# Patient Record
Sex: MALE | Race: WHITE | NOT HISPANIC OR LATINO | Employment: OTHER | URBAN - METROPOLITAN AREA
[De-identification: names, ages, dates, MRNs, and addresses within clinical notes are randomized per-mention and may not be internally consistent; named-entity substitution may affect disease eponyms.]

---

## 2017-11-10 ENCOUNTER — DOCUMENTATION ONLY (OUTPATIENT)
Dept: PREADMISSION TESTING | Facility: HOSPITAL | Age: 72
End: 2017-11-10

## 2017-11-10 ENCOUNTER — DOCUMENTATION ONLY (OUTPATIENT)
Dept: ORTHOPEDICS | Facility: CLINIC | Age: 72
End: 2017-11-10

## 2017-11-13 ENCOUNTER — HOSPITAL ENCOUNTER (OUTPATIENT)
Dept: RADIOLOGY | Facility: HOSPITAL | Age: 72
Discharge: HOME | End: 2017-11-13
Attending: ORTHOPAEDIC SURGERY
Payer: MEDICARE

## 2017-11-13 ENCOUNTER — HOSPITAL ENCOUNTER (OUTPATIENT)
Dept: PREADMISSION TESTING | Facility: HOSPITAL | Age: 72
Discharge: HOME | End: 2017-11-13
Attending: ORTHOPAEDIC SURGERY
Payer: MEDICARE

## 2017-11-13 ENCOUNTER — TELEPHONE (OUTPATIENT)
Dept: ORTHOPEDICS | Facility: CLINIC | Age: 72
End: 2017-11-13

## 2017-11-13 ENCOUNTER — DOCUMENTATION ONLY (OUTPATIENT)
Dept: ORTHOPEDICS | Facility: CLINIC | Age: 72
End: 2017-11-13

## 2017-11-13 VITALS — WEIGHT: 175 LBS | HEIGHT: 66 IN | BODY MASS INDEX: 28.12 KG/M2

## 2017-11-13 DIAGNOSIS — Z01.818 PRE-OP EXAM: ICD-10-CM

## 2017-11-13 DIAGNOSIS — M17.12 OSTEOARTHRITIS OF LEFT KNEE, UNSPECIFIED OSTEOARTHRITIS TYPE: Primary | ICD-10-CM

## 2017-11-13 LAB
ALBUMIN SERPL BCP-MCNC: 3.5 G/DL
ALP SERPL-CCNC: 36 U/L
ALT SERPL W/O P-5'-P-CCNC: 15 U/L
ANION GAP SERPL CALC-SCNC: 7 MMOL/L
AST SERPL-CCNC: 19 U/L
BASOPHILS # BLD AUTO: 0 K/UL
BASOPHILS NFR BLD: 0.1 %
BILIRUB SERPL-MCNC: 0.6 MG/DL
BILIRUB UR QL STRIP: NEGATIVE
BUN SERPL-MCNC: 18 MG/DL
CALCIUM SERPL-MCNC: 9.2 MG/DL
CHLORIDE SERPL-SCNC: 104 MMOL/L
CLARITY UR: CLEAR
CO2 SERPL-SCNC: 29 MMOL/L
COLOR UR: YELLOW
CREAT SERPL-MCNC: 1.2 MG/DL
DIFFERENTIAL METHOD: ABNORMAL
EOSINOPHIL # BLD AUTO: 0.2 K/UL
EOSINOPHIL NFR BLD: 3 %
ERYTHROCYTE [DISTWIDTH] IN BLOOD BY AUTOMATED COUNT: 12.8 %
EST. GFR  (AFRICAN AMERICAN): >60 ML/MIN/1.73 M^2
EST. GFR  (NON AFRICAN AMERICAN): >60 ML/MIN/1.73 M^2
GLUCOSE SERPL-MCNC: 91 MG/DL
GLUCOSE UR QL STRIP: NEGATIVE
HCT VFR BLD AUTO: 41.9 %
HGB BLD-MCNC: 14.2 G/DL
HGB UR QL STRIP: NEGATIVE
KETONES UR QL STRIP: NEGATIVE
LEUKOCYTE ESTERASE UR QL STRIP: NEGATIVE
LYMPHOCYTES # BLD AUTO: 1.1 K/UL
LYMPHOCYTES NFR BLD: 18.3 %
MCH RBC QN AUTO: 31.7 PG
MCHC RBC AUTO-ENTMCNC: 34 G/DL
MCV RBC AUTO: 93 FL
MONOCYTES # BLD AUTO: 0.6 K/UL
MONOCYTES NFR BLD: 11.1 %
NEUTROPHILS # BLD AUTO: 3.9 K/UL
NEUTROPHILS NFR BLD: 67.5 %
NITRITE UR QL STRIP: NEGATIVE
PH UR STRIP: 6 [PH] (ref 5–8)
PLATELET # BLD AUTO: 257 K/UL
PMV BLD AUTO: 7.9 FL
POTASSIUM SERPL-SCNC: 4.1 MMOL/L
PROT SERPL-MCNC: 7.1 G/DL
PROT UR QL STRIP: NEGATIVE
RBC # BLD AUTO: 4.5 M/UL
SODIUM SERPL-SCNC: 140 MMOL/L
SP GR UR STRIP: 1.02 (ref 1–1.03)
URN SPEC COLLECT METH UR: NORMAL
UROBILINOGEN UR STRIP-ACNC: NEGATIVE EU/DL
WBC # BLD AUTO: 5.8 K/UL

## 2017-11-13 PROCEDURE — 99900103 DSU ONLY-NO CHARGE-INITIAL HR (STAT)

## 2017-11-13 PROCEDURE — 36415 COLL VENOUS BLD VENIPUNCTURE: CPT

## 2017-11-13 PROCEDURE — 93010 ELECTROCARDIOGRAM REPORT: CPT | Mod: ,,, | Performed by: INTERNAL MEDICINE

## 2017-11-13 PROCEDURE — 87081 CULTURE SCREEN ONLY: CPT

## 2017-11-13 PROCEDURE — 85025 COMPLETE CBC W/AUTO DIFF WBC: CPT

## 2017-11-13 PROCEDURE — 71020 XR CHEST PA AND LATERAL: CPT | Mod: TC

## 2017-11-13 PROCEDURE — 80053 COMPREHEN METABOLIC PANEL: CPT

## 2017-11-13 PROCEDURE — 81003 URINALYSIS AUTO W/O SCOPE: CPT

## 2017-11-13 PROCEDURE — 99900104 DSU ONLY-NO CHARGE-EA ADD'L HR (STAT)

## 2017-11-13 PROCEDURE — 71020 XR CHEST PA AND LATERAL: CPT | Mod: 26,,, | Performed by: RADIOLOGY

## 2017-11-13 PROCEDURE — 93005 ELECTROCARDIOGRAM TRACING: CPT

## 2017-11-13 RX ORDER — OMEPRAZOLE 20 MG/1
20 CAPSULE, DELAYED RELEASE ORAL DAILY
COMMUNITY
End: 2020-07-29 | Stop reason: SDUPTHER

## 2017-11-13 RX ORDER — MECLIZINE HYDROCHLORIDE 25 MG/1
25 TABLET ORAL
COMMUNITY
End: 2021-01-27 | Stop reason: SDUPTHER

## 2017-11-13 RX ORDER — ATORVASTATIN CALCIUM 20 MG/1
20 TABLET, FILM COATED ORAL DAILY
COMMUNITY
End: 2020-07-29 | Stop reason: SDUPTHER

## 2017-11-13 RX ORDER — LEVOTHYROXINE SODIUM 50 UG/1
50 TABLET ORAL DAILY
COMMUNITY
End: 2019-12-26 | Stop reason: SDUPTHER

## 2017-11-13 RX ORDER — FUROSEMIDE 20 MG/1
20 TABLET ORAL DAILY
COMMUNITY

## 2017-11-13 RX ORDER — ATENOLOL 25 MG/1
25 TABLET ORAL NIGHTLY
COMMUNITY
End: 2020-07-29 | Stop reason: SDUPTHER

## 2017-11-13 RX ORDER — CYCLOBENZAPRINE HCL 10 MG
10 TABLET ORAL 2 TIMES DAILY PRN
COMMUNITY
End: 2020-01-24 | Stop reason: SDUPTHER

## 2017-11-13 NOTE — PRE ADMISSION SCREENING
Patient Name: Mor Hill  YOB: 1945   MRN: [id number]     [facility]   Basic Mobility Inpatient Short Form 6 Clicks         How much difficulty does the patient currently have  Unable  A Lot  A Little  None      1. Turning over in bed (including adjusting bedclothes, sheets and blankets)?     1 []    2 []    3 [x]    4 []        2. Sitting down on and standing up from a chair with arms (e.g., wheelchair, bedside commode, etc.)     1 []  2 []  3 [x]     4 []      3. Moving from lying on back to sitting on the side of the bed?     1 []  2 []  3 []    4 [x]    How much help from another person does the patient currently need  Total  A Lot  A Little  None      4. Moving to and from a bed to a chair (including a wheelchair)?    1 []  2 []  3 [x]    4 []      5. Need to walk in hospital room?    1 []  2 []  3 []    4 [x]      6. Climbing 3-5 steps with a railing?    1 []  2 []  3 [x]    4 []       Raw Score:     20             CMS 0-100% Score:  35.83          %   Standardized Score:    47.67           CMS Modifier:     CJ                                     [facility]   Basic [facility]ility [facility]patient Short Form 6 Clicks Score Conversion Table*         *Use this form to convert -PAC Basic Mobility Inpatient Raw Scores.   [facility] Inpatient Basic Mobility Short Form Scoring Example   1. Add the number values associated with the response to each item. For example, items totals yield a Raw Score of 21.   2. Match the raw score to the t-Scale scores (t-Scale score = 50.25, SE = 4.69).   3. Find the associated CMS % (CMS % = 28.97%).   4. Locate the correct CMS Functional Modifier Code, or G Code (G code = CJ)     NOTE: Each -PAC Short Form has a separate conversion table. Make sure that you use the correct conversion table.       Instruction Manual - page 45 contains conversion table

## 2017-11-13 NOTE — PRE ADMISSION SCREENING
JOINT CAMP ASSESSMENT    Name Mor Hill   MRN [id number]    Age/Sex 71 y.o. male    Surgeon Dr. Renny Linda   Joint Camp Date 11/13/2017   Surgery Date 11/27/2017   Procedure Left Knee Arthroplasty   Insurance Payor: [employer] / Plan: [employer] 65 / Product Type: Medicare Advantage /    Care Team Patient Care Team:  Primary Doctor No as PCP - General  Renny Linda MD as Consulting Physician (Orthopedic Surgery)    Pharmacy No Pharmacies Listed   AM-PAC Score   20   Risk Assessment Score 5     Past Medical History:   Diagnosis Date    Coronary artery disease     Heart attack     Hypertension     Thyroid disease     Vertigo        Past Surgical History:   Procedure Laterality Date    bilateral hammer toe      CARDIAC SURGERY      bypass    HERNIA REPAIR      left RCR      LUNG REMOVAL, PARTIAL      ROTATOR CUFF REPAIR      left         Home Enviroment     Living Arrangement: Lives with spouse, Lives in home  Home Environment: 1-story house/ trailer, number of outside stairs: 0, walk-in shower  Home Safety Concerns: Pets in the home: dogs (2).    DISCHARGE CAREGIVER/SUPPORT SYSTEM     Identified post-op caregiver: Patient has spouse / significant other and caregivers available 24 hours per day.  Patient's caregiver(s) will be able to provide physical assistance. Patient will have someone to assist overnight.      Caregiver present at pre-op interview:  No      PRE-OPERATIVE FUNCTIONAL STATUS     Employment: Retired    Pre-op Functional Status: Patient is independent with mobility/ambulation, transfers, ADL's, IADL's.    Use of assistive device for ambulation: none  ADL: self care  ADL Limitations: none  Medical Restrictions: Decreased range of motions in extremities    POTENTIAL BARRIERS TO DISCHARGE/POTENTIAL POST-OP COMPLICATIONS     Concerned about the level of assistance for patient. Wife currently with broken toe. Patient states that he and wife will leave hospital  via cab due to wifes inability to drive. Patients states that he is the one who buys the groceries and is concerned about not driving for a 6 week period. States that he would rather skip the pain medication in order to drive earlier. Patient stated that he got bored after heart surgery and drove to [facility] to get out of the house against MD orders.    DISCHARGE PLAN     Expected LOS of 2 days or less for joint replacement discussed with patient. We also discussed a discharge path of HH for approximately two weeks with a transition to outpatient PT on the third week given no post-op complications.      Patient in agreement with discharge plan: Yes    Discharge to: Discharge home with home darrell (PT/OT) x2 weeks with transition to outpatient PT.     HH: [facility]      OP PT: [facility]     Home DME: rolling walker     Needed DME at D/C: bedside commode     Rx: Per Dr. Linda at discharge.     Meds to Beds:   Patient expected to discharge on current dose of Plavix 75 mg daily for DVT prophylaxis.

## 2017-11-13 NOTE — PRE ADMISSION SCREENING
"               CJR Risk Assessment Scale    Patient Name: Mor Hill  YOB: 1945  MRN: [id number]            RIsk Factor Measure Recommendation Patient Data Scale/Score   BMI >40 Reconsider surgery, weight loss   Estimated body mass index is 28.25 kg/m² as calculated from the following:    Height as of this encounter: 5' 6" (1.676 m).    Weight as of this encounter: 79.4 kg (175 lb).   [] 0 = 1 - 24.9  [x] 1 = 25-29.9  [] 2 = 30-34.9  [] 3 = 35-39.9  [] 4 = 40-44.9  [] 5 = 45-99.9   Hemoglobin AIC (if applicable) >9 Delay surgery until DM under control  Refer for:  · Nutrition Therapy  · Exercise   · Medication    No results found for: LABA1C, HGBA1C    Lab Results   Component Value Date     (H) 09/26/2014      [] 0 = 4.0-5.6  [] 1 = 5.7-6.4  [] 2 = 6.5-6.9  [] 3 = 7.0-7.9  [] 4 = 8.0-8.9  [] 5 = 9.0-12   Hemoglobin (Anemia) <9 Delay surgery   Correct anemia Lab Results   Component Value Date    HGB 13.7 (L) 09/26/2014    [] 20 - <9.0                    Albumin <3 Delay surgery &Workup Lab Results   Component Value Date    ALBUMIN 3.5 09/26/2014    [] 20 - <3.0   Smoking Cessation >4 Weeks Delay Surgery  Refer to OP Cessation Class     [] 20 - current smoker                                _____ PPD                    Hx of MI, PE, Arrhythmia, CVA, DVT <30 Days Delay Surgery     [] 20      Infection Variable Delay surgery and re-evaluate    [] 20 - recent/current infection     Depression (PHQ) >10 out of 27 Delay Surgery and re-evaluate  Medication  Counseling              [x] 0     []1     []2     []3      []4      [] 5                    (1-4)      (5-9)  (10-14)  (15-19)   (20-27)     Memory Impairment & Memory loss (Mini-Cog Screening Tool) Advanced dementia and/or Parkinson's Reconsider surgery     [x] 0     []1     []2     []3     []4     [] 5     Physical Conditioning (Modified AM-PAC Per Physical Therapy at [facility]) Unable to ambulate on day of surgery Delay surgery and " re-evaluate  Pre-Rehabilitation   (PT evaluation)       []  0   [x]4       []8     []12        []16     []20       (<20%)   (<40%)   (<60%)   (<80% )    (>80%)     Home Environment/Caregiver support  (Per /Navigator Interview)    Availability of basic services and/or approprate assistance during post-operative period Delay surgery and re-evaluate  Safe home environment  Health   1 week post-surgery  Transportation  availability  Ability to obtain DME/Medications post-op    [x] 0     []1     []2     []3     []4     [] 5  [x] 0     []1     []2     []3     []4     [] 5  [x] 0     []1     []2     []3     []4     [] 5  [x] 0     []1     []2     []3     []4     [] 5         MD Contact: Dr. Linda Comments:  Total Score:  5

## 2017-11-15 LAB — MRSA SPEC QL CULT: NORMAL

## 2017-11-24 ENCOUNTER — ANESTHESIA EVENT (OUTPATIENT)
Dept: SURGERY | Facility: HOSPITAL | Age: 72
DRG: 470 | End: 2017-11-24
Payer: MEDICARE

## 2017-11-27 ENCOUNTER — ANESTHESIA (OUTPATIENT)
Dept: SURGERY | Facility: HOSPITAL | Age: 72
DRG: 470 | End: 2017-11-27
Payer: MEDICARE

## 2017-11-27 ENCOUNTER — HOSPITAL ENCOUNTER (INPATIENT)
Facility: HOSPITAL | Age: 72
LOS: 2 days | Discharge: HOME HEALTH | DRG: 470 | End: 2017-11-29
Attending: ORTHOPAEDIC SURGERY | Admitting: ORTHOPAEDIC SURGERY
Payer: MEDICARE

## 2017-11-27 DIAGNOSIS — Z96.652 S/P TOTAL KNEE ARTHROPLASTY, LEFT: Primary | ICD-10-CM

## 2017-11-27 DIAGNOSIS — M17.12 OSTEOARTHRITIS OF LEFT KNEE: ICD-10-CM

## 2017-11-27 DIAGNOSIS — I25.10 CORONARY ARTERY DISEASE, ANGINA PRESENCE UNSPECIFIED, UNSPECIFIED VESSEL OR LESION TYPE, UNSPECIFIED WHETHER NATIVE OR TRANSPLANTED HEART: ICD-10-CM

## 2017-11-27 DIAGNOSIS — I10 HYPERTENSION, UNSPECIFIED TYPE: ICD-10-CM

## 2017-11-27 DIAGNOSIS — M17.12 OSTEOARTHRITIS OF LEFT KNEE, UNSPECIFIED OSTEOARTHRITIS TYPE: ICD-10-CM

## 2017-11-27 DIAGNOSIS — E07.9 THYROID DISEASE: ICD-10-CM

## 2017-11-27 PROCEDURE — C2626 INFUSION PUMP, NON-PROG,TEMP: HCPCS | Performed by: ANESTHESIOLOGY

## 2017-11-27 PROCEDURE — D9220A PRA ANESTHESIA: Mod: CRNA,,, | Performed by: NURSE ANESTHETIST, CERTIFIED REGISTERED

## 2017-11-27 PROCEDURE — 12000002 HC ACUTE/MED SURGE SEMI-PRIVATE ROOM

## 2017-11-27 PROCEDURE — 25000003 PHARM REV CODE 250: Performed by: ANESTHESIOLOGY

## 2017-11-27 PROCEDURE — 99900103 DSU ONLY-NO CHARGE-INITIAL HR (STAT): Performed by: ORTHOPAEDIC SURGERY

## 2017-11-27 PROCEDURE — 27000221 HC OXYGEN, UP TO 24 HOURS

## 2017-11-27 PROCEDURE — 25000003 PHARM REV CODE 250: Performed by: INTERNAL MEDICINE

## 2017-11-27 PROCEDURE — 37000009 HC ANESTHESIA EA ADD 15 MINS: Performed by: ORTHOPAEDIC SURGERY

## 2017-11-27 PROCEDURE — 63600175 PHARM REV CODE 636 W HCPCS: Performed by: ANESTHESIOLOGY

## 2017-11-27 PROCEDURE — 27200750 HC INSULATED NEEDLE/ STIMUPLEX: Performed by: ANESTHESIOLOGY

## 2017-11-27 PROCEDURE — 76942 ECHO GUIDE FOR BIOPSY: CPT | Mod: 26,,, | Performed by: ANESTHESIOLOGY

## 2017-11-27 PROCEDURE — 99222 1ST HOSP IP/OBS MODERATE 55: CPT | Mod: ,,, | Performed by: INTERNAL MEDICINE

## 2017-11-27 PROCEDURE — 94761 N-INVAS EAR/PLS OXIMETRY MLT: CPT

## 2017-11-27 PROCEDURE — S5010 5% DEXTROSE AND 0.45% SALINE: HCPCS | Performed by: ORTHOPAEDIC SURGERY

## 2017-11-27 PROCEDURE — 63600175 PHARM REV CODE 636 W HCPCS: Performed by: ORTHOPAEDIC SURGERY

## 2017-11-27 PROCEDURE — 97116 GAIT TRAINING THERAPY: CPT

## 2017-11-27 PROCEDURE — 37000008 HC ANESTHESIA 1ST 15 MINUTES: Performed by: ORTHOPAEDIC SURGERY

## 2017-11-27 PROCEDURE — 36000711: Performed by: ORTHOPAEDIC SURGERY

## 2017-11-27 PROCEDURE — 71000039 HC RECOVERY, EACH ADD'L HOUR: Performed by: ORTHOPAEDIC SURGERY

## 2017-11-27 PROCEDURE — 0SRD0J9 REPLACEMENT OF LEFT KNEE JOINT WITH SYNTHETIC SUBSTITUTE, CEMENTED, OPEN APPROACH: ICD-10-PCS | Performed by: ORTHOPAEDIC SURGERY

## 2017-11-27 PROCEDURE — 27200664 HC NERVE BLOCK COMPLETE KIT: Performed by: ANESTHESIOLOGY

## 2017-11-27 PROCEDURE — C1776 JOINT DEVICE (IMPLANTABLE): HCPCS | Performed by: ORTHOPAEDIC SURGERY

## 2017-11-27 PROCEDURE — 25000003 PHARM REV CODE 250: Performed by: NURSE ANESTHETIST, CERTIFIED REGISTERED

## 2017-11-27 PROCEDURE — 99900104 DSU ONLY-NO CHARGE-EA ADD'L HR (STAT): Performed by: ORTHOPAEDIC SURGERY

## 2017-11-27 PROCEDURE — 63600175 PHARM REV CODE 636 W HCPCS: Performed by: NURSE ANESTHETIST, CERTIFIED REGISTERED

## 2017-11-27 PROCEDURE — 27200688 HC TRAY, SPINAL-HYPER/ ISOBARIC: Performed by: NURSE ANESTHETIST, CERTIFIED REGISTERED

## 2017-11-27 PROCEDURE — D9220A PRA ANESTHESIA: Mod: ANES,,, | Performed by: ANESTHESIOLOGY

## 2017-11-27 PROCEDURE — 63600175 PHARM REV CODE 636 W HCPCS

## 2017-11-27 PROCEDURE — 64448 NJX AA&/STRD FEM NRV NFS IMG: CPT | Mod: 59,LT,, | Performed by: ANESTHESIOLOGY

## 2017-11-27 PROCEDURE — 36000710: Performed by: ORTHOPAEDIC SURGERY

## 2017-11-27 PROCEDURE — 71000033 HC RECOVERY, INTIAL HOUR: Performed by: ORTHOPAEDIC SURGERY

## 2017-11-27 PROCEDURE — C1729 CATH, DRAINAGE: HCPCS | Performed by: ORTHOPAEDIC SURGERY

## 2017-11-27 PROCEDURE — 25000003 PHARM REV CODE 250: Performed by: ORTHOPAEDIC SURGERY

## 2017-11-27 PROCEDURE — 27201423 OPTIME MED/SURG SUP & DEVICES STERILE SUPPLY: Performed by: ORTHOPAEDIC SURGERY

## 2017-11-27 PROCEDURE — 97530 THERAPEUTIC ACTIVITIES: CPT

## 2017-11-27 PROCEDURE — 76942 ECHO GUIDE FOR BIOPSY: CPT | Performed by: ANESTHESIOLOGY

## 2017-11-27 PROCEDURE — 64450 NJX AA&/STRD OTHER PN/BRANCH: CPT | Mod: 59,LT,, | Performed by: ANESTHESIOLOGY

## 2017-11-27 PROCEDURE — C1713 ANCHOR/SCREW BN/BN,TIS/BN: HCPCS | Performed by: ORTHOPAEDIC SURGERY

## 2017-11-27 PROCEDURE — 97161 PT EVAL LOW COMPLEX 20 MIN: CPT

## 2017-11-27 DEVICE — IMPLANTABLE DEVICE: Type: IMPLANTABLE DEVICE | Site: KNEE | Status: FUNCTIONAL

## 2017-11-27 DEVICE — PATELLA ONLAY 29MM TRI PEG: Type: IMPLANTABLE DEVICE | Site: KNEE | Status: FUNCTIONAL

## 2017-11-27 RX ORDER — ONDANSETRON 2 MG/ML
INJECTION INTRAMUSCULAR; INTRAVENOUS
Status: DISCONTINUED | OUTPATIENT
Start: 2017-11-27 | End: 2017-11-27

## 2017-11-27 RX ORDER — OXYCODONE HCL 10 MG/1
10 TABLET, FILM COATED, EXTENDED RELEASE ORAL EVERY 12 HOURS
Status: DISCONTINUED | OUTPATIENT
Start: 2017-11-27 | End: 2017-11-29 | Stop reason: HOSPADM

## 2017-11-27 RX ORDER — KETOROLAC TROMETHAMINE 30 MG/ML
INJECTION, SOLUTION INTRAMUSCULAR; INTRAVENOUS
Status: DISCONTINUED | OUTPATIENT
Start: 2017-11-27 | End: 2017-11-27 | Stop reason: HOSPADM

## 2017-11-27 RX ORDER — FENTANYL CITRATE 50 UG/ML
INJECTION, SOLUTION INTRAMUSCULAR; INTRAVENOUS
Status: DISCONTINUED | OUTPATIENT
Start: 2017-11-27 | End: 2017-11-27

## 2017-11-27 RX ORDER — CELECOXIB 100 MG/1
100 CAPSULE ORAL 2 TIMES DAILY
Status: DISCONTINUED | OUTPATIENT
Start: 2017-11-27 | End: 2017-11-29 | Stop reason: HOSPADM

## 2017-11-27 RX ORDER — CELECOXIB 100 MG/1
200 CAPSULE ORAL
Status: COMPLETED | OUTPATIENT
Start: 2017-11-27 | End: 2017-11-27

## 2017-11-27 RX ORDER — ZOLPIDEM TARTRATE 5 MG/1
5 TABLET ORAL NIGHTLY PRN
Status: DISCONTINUED | OUTPATIENT
Start: 2017-11-27 | End: 2017-11-29 | Stop reason: HOSPADM

## 2017-11-27 RX ORDER — ATENOLOL 25 MG/1
25 TABLET ORAL NIGHTLY
Status: DISCONTINUED | OUTPATIENT
Start: 2017-11-27 | End: 2017-11-29 | Stop reason: HOSPADM

## 2017-11-27 RX ORDER — PROPOFOL 10 MG/ML
VIAL (ML) INTRAVENOUS CONTINUOUS PRN
Status: DISCONTINUED | OUTPATIENT
Start: 2017-11-27 | End: 2017-11-27

## 2017-11-27 RX ORDER — ACETAMINOPHEN 10 MG/ML
1000 INJECTION, SOLUTION INTRAVENOUS EVERY 8 HOURS
Status: COMPLETED | OUTPATIENT
Start: 2017-11-27 | End: 2017-11-28

## 2017-11-27 RX ORDER — CLOPIDOGREL BISULFATE 75 MG/1
75 TABLET ORAL DAILY
Status: DISCONTINUED | OUTPATIENT
Start: 2017-11-28 | End: 2017-11-29 | Stop reason: HOSPADM

## 2017-11-27 RX ORDER — ATORVASTATIN CALCIUM 20 MG/1
20 TABLET, FILM COATED ORAL DAILY
Status: DISCONTINUED | OUTPATIENT
Start: 2017-11-28 | End: 2017-11-29 | Stop reason: HOSPADM

## 2017-11-27 RX ORDER — TRANEXAMIC ACID 100 MG/ML
INJECTION, SOLUTION INTRAVENOUS
Status: DISCONTINUED | OUTPATIENT
Start: 2017-11-27 | End: 2017-11-27

## 2017-11-27 RX ORDER — ROPIVACAINE HYDROCHLORIDE 5 MG/ML
INJECTION, SOLUTION EPIDURAL; INFILTRATION; PERINEURAL
Status: DISCONTINUED | OUTPATIENT
Start: 2017-11-27 | End: 2017-11-27 | Stop reason: HOSPADM

## 2017-11-27 RX ORDER — OXYCODONE HYDROCHLORIDE 5 MG/1
5 TABLET ORAL
Status: DISCONTINUED | OUTPATIENT
Start: 2017-11-27 | End: 2017-11-29 | Stop reason: HOSPADM

## 2017-11-27 RX ORDER — ASPIRIN 325 MG
325 TABLET ORAL 2 TIMES DAILY
Status: DISCONTINUED | OUTPATIENT
Start: 2017-11-27 | End: 2017-11-29 | Stop reason: HOSPADM

## 2017-11-27 RX ORDER — FENTANYL CITRATE 50 UG/ML
25 INJECTION, SOLUTION INTRAMUSCULAR; INTRAVENOUS EVERY 5 MIN PRN
Status: DISCONTINUED | OUTPATIENT
Start: 2017-11-27 | End: 2017-11-27 | Stop reason: HOSPADM

## 2017-11-27 RX ORDER — ONDANSETRON 2 MG/ML
4 INJECTION INTRAMUSCULAR; INTRAVENOUS DAILY PRN
Status: DISCONTINUED | OUTPATIENT
Start: 2017-11-27 | End: 2017-11-27 | Stop reason: HOSPADM

## 2017-11-27 RX ORDER — FAMOTIDINE 20 MG/1
20 TABLET, FILM COATED ORAL 2 TIMES DAILY
Status: DISCONTINUED | OUTPATIENT
Start: 2017-11-27 | End: 2017-11-29 | Stop reason: HOSPADM

## 2017-11-27 RX ORDER — PREGABALIN 75 MG/1
75 CAPSULE ORAL
Status: COMPLETED | OUTPATIENT
Start: 2017-11-27 | End: 2017-11-27

## 2017-11-27 RX ORDER — MIDAZOLAM HYDROCHLORIDE 1 MG/ML
INJECTION, SOLUTION INTRAMUSCULAR; INTRAVENOUS
Status: DISCONTINUED | OUTPATIENT
Start: 2017-11-27 | End: 2017-11-27

## 2017-11-27 RX ORDER — DEXTROSE MONOHYDRATE AND SODIUM CHLORIDE 5; .45 G/100ML; G/100ML
INJECTION, SOLUTION INTRAVENOUS CONTINUOUS
Status: DISCONTINUED | OUTPATIENT
Start: 2017-11-27 | End: 2017-11-27

## 2017-11-27 RX ORDER — MORPHINE SULFATE 4 MG/ML
2 INJECTION, SOLUTION INTRAMUSCULAR; INTRAVENOUS EVERY 4 HOURS PRN
Status: DISCONTINUED | OUTPATIENT
Start: 2017-11-27 | End: 2017-11-29 | Stop reason: HOSPADM

## 2017-11-27 RX ORDER — LEVOTHYROXINE SODIUM 50 UG/1
50 TABLET ORAL
Status: DISCONTINUED | OUTPATIENT
Start: 2017-11-28 | End: 2017-11-29 | Stop reason: HOSPADM

## 2017-11-27 RX ORDER — OXYCODONE HCL 10 MG/1
10 TABLET, FILM COATED, EXTENDED RELEASE ORAL
Status: COMPLETED | OUTPATIENT
Start: 2017-11-27 | End: 2017-11-27

## 2017-11-27 RX ORDER — ONDANSETRON 4 MG/1
8 TABLET, ORALLY DISINTEGRATING ORAL EVERY 8 HOURS PRN
Status: DISCONTINUED | OUTPATIENT
Start: 2017-11-27 | End: 2017-11-29 | Stop reason: HOSPADM

## 2017-11-27 RX ORDER — CEFAZOLIN SODIUM 2 G/50ML
2 SOLUTION INTRAVENOUS ONCE
Status: COMPLETED | OUTPATIENT
Start: 2017-11-27 | End: 2017-11-27

## 2017-11-27 RX ORDER — ACETAMINOPHEN 10 MG/ML
1000 INJECTION, SOLUTION INTRAVENOUS EVERY 8 HOURS
Status: DISCONTINUED | OUTPATIENT
Start: 2017-11-27 | End: 2017-11-27 | Stop reason: HOSPADM

## 2017-11-27 RX ORDER — PREGABALIN 75 MG/1
75 CAPSULE ORAL ONCE
Status: DISCONTINUED | OUTPATIENT
Start: 2017-11-27 | End: 2017-11-29 | Stop reason: HOSPADM

## 2017-11-27 RX ORDER — LIDOCAINE HYDROCHLORIDE 10 MG/ML
1 INJECTION, SOLUTION EPIDURAL; INFILTRATION; INTRACAUDAL; PERINEURAL ONCE
Status: COMPLETED | OUTPATIENT
Start: 2017-11-27 | End: 2017-11-27

## 2017-11-27 RX ORDER — EPINEPHRINE 0.1 MG/ML
INJECTION INTRAVENOUS
Status: DISCONTINUED | OUTPATIENT
Start: 2017-11-27 | End: 2017-11-27 | Stop reason: HOSPADM

## 2017-11-27 RX ORDER — POLYETHYLENE GLYCOL 3350 17 G/17G
17 POWDER, FOR SOLUTION ORAL DAILY
Status: DISCONTINUED | OUTPATIENT
Start: 2017-11-27 | End: 2017-11-29 | Stop reason: HOSPADM

## 2017-11-27 RX ORDER — LIDOCAINE HCL/PF 100 MG/5ML
SYRINGE (ML) INTRAVENOUS
Status: DISCONTINUED | OUTPATIENT
Start: 2017-11-27 | End: 2017-11-27

## 2017-11-27 RX ORDER — BISACODYL 10 MG
10 SUPPOSITORY, RECTAL RECTAL DAILY
Status: DISCONTINUED | OUTPATIENT
Start: 2017-11-27 | End: 2017-11-29 | Stop reason: HOSPADM

## 2017-11-27 RX ORDER — CEFAZOLIN SODIUM 1 G/50ML
1 SOLUTION INTRAVENOUS
Status: COMPLETED | OUTPATIENT
Start: 2017-11-27 | End: 2017-11-28

## 2017-11-27 RX ORDER — MORPHINE SULFATE 0.5 MG/ML
INJECTION, SOLUTION EPIDURAL; INTRATHECAL; INTRAVENOUS
Status: DISCONTINUED | OUTPATIENT
Start: 2017-11-27 | End: 2017-11-27 | Stop reason: HOSPADM

## 2017-11-27 RX ORDER — SODIUM CHLORIDE, SODIUM LACTATE, POTASSIUM CHLORIDE, CALCIUM CHLORIDE 600; 310; 30; 20 MG/100ML; MG/100ML; MG/100ML; MG/100ML
1000 INJECTION, SOLUTION INTRAVENOUS ONCE
Status: DISCONTINUED | OUTPATIENT
Start: 2017-11-27 | End: 2017-11-27 | Stop reason: HOSPADM

## 2017-11-27 RX ADMIN — MIDAZOLAM 1 MG: 1 INJECTION INTRAMUSCULAR; INTRAVENOUS at 06:11

## 2017-11-27 RX ADMIN — ROPIVACAINE HYDROCHLORIDE: 2 INJECTION, SOLUTION EPIDURAL; INFILTRATION at 09:11

## 2017-11-27 RX ADMIN — PREGABALIN 75 MG: 75 CAPSULE ORAL at 06:11

## 2017-11-27 RX ADMIN — MIDAZOLAM 1 MG: 1 INJECTION INTRAMUSCULAR; INTRAVENOUS at 07:11

## 2017-11-27 RX ADMIN — CEFAZOLIN SODIUM 2 G: 2 SOLUTION INTRAVENOUS at 07:11

## 2017-11-27 RX ADMIN — CELECOXIB 200 MG: 100 CAPSULE ORAL at 06:11

## 2017-11-27 RX ADMIN — SODIUM CHLORIDE, SODIUM GLUCONATE, SODIUM ACETATE, POTASSIUM CHLORIDE, MAGNESIUM CHLORIDE, SODIUM PHOSPHATE, DIBASIC, AND POTASSIUM PHOSPHATE: .53; .5; .37; .037; .03; .012; .00082 INJECTION, SOLUTION INTRAVENOUS at 06:11

## 2017-11-27 RX ADMIN — ASPIRIN 325 MG ORAL TABLET 325 MG: 325 PILL ORAL at 09:11

## 2017-11-27 RX ADMIN — LIDOCAINE HYDROCHLORIDE: 10 INJECTION, SOLUTION EPIDURAL; INFILTRATION; INTRACAUDAL; PERINEURAL at 06:11

## 2017-11-27 RX ADMIN — TRANEXAMIC ACID 800 MG: 100 INJECTION, SOLUTION INTRAVENOUS at 07:11

## 2017-11-27 RX ADMIN — FAMOTIDINE 20 MG: 20 TABLET, FILM COATED ORAL at 11:11

## 2017-11-27 RX ADMIN — FENTANYL CITRATE 50 MCG: 50 INJECTION, SOLUTION INTRAMUSCULAR; INTRAVENOUS at 06:11

## 2017-11-27 RX ADMIN — ACETAMINOPHEN 1000 MG: 10 INJECTION, SOLUTION INTRAVENOUS at 07:11

## 2017-11-27 RX ADMIN — SODIUM CHLORIDE, SODIUM GLUCONATE, SODIUM ACETATE, POTASSIUM CHLORIDE, MAGNESIUM CHLORIDE, SODIUM PHOSPHATE, DIBASIC, AND POTASSIUM PHOSPHATE: .53; .5; .37; .037; .03; .012; .00082 INJECTION, SOLUTION INTRAVENOUS at 07:11

## 2017-11-27 RX ADMIN — FAMOTIDINE 20 MG: 20 TABLET, FILM COATED ORAL at 09:11

## 2017-11-27 RX ADMIN — LIDOCAINE HYDROCHLORIDE 50 MG: 20 INJECTION, SOLUTION INTRAVENOUS at 07:11

## 2017-11-27 RX ADMIN — POLYETHYLENE GLYCOL (3350) 17 G: 17 POWDER, FOR SOLUTION ORAL at 11:11

## 2017-11-27 RX ADMIN — PROPOFOL 20 MCG/KG/MIN: 10 INJECTION, EMULSION INTRAVENOUS at 07:11

## 2017-11-27 RX ADMIN — ATENOLOL 25 MG: 25 TABLET ORAL at 09:11

## 2017-11-27 RX ADMIN — FENTANYL CITRATE 50 MCG: 50 INJECTION, SOLUTION INTRAMUSCULAR; INTRAVENOUS at 08:11

## 2017-11-27 RX ADMIN — OXYCODONE HYDROCHLORIDE 10 MG: 10 TABLET, FILM COATED, EXTENDED RELEASE ORAL at 06:11

## 2017-11-27 RX ADMIN — SODIUM CHLORIDE, SODIUM GLUCONATE, SODIUM ACETATE, POTASSIUM CHLORIDE, MAGNESIUM CHLORIDE, SODIUM PHOSPHATE, DIBASIC, AND POTASSIUM PHOSPHATE: .53; .5; .37; .037; .03; .012; .00082 INJECTION, SOLUTION INTRAVENOUS at 08:11

## 2017-11-27 RX ADMIN — ONDANSETRON 4 MG: 2 INJECTION, SOLUTION INTRAMUSCULAR; INTRAVENOUS at 07:11

## 2017-11-27 RX ADMIN — CEFAZOLIN SODIUM 1 G: 1 SOLUTION INTRAVENOUS at 04:11

## 2017-11-27 RX ADMIN — DEXTROSE MONOHYDRATE AND SODIUM CHLORIDE: 5; .45 INJECTION, SOLUTION INTRAVENOUS at 10:11

## 2017-11-27 RX ADMIN — OXYCODONE HYDROCHLORIDE 10 MG: 10 TABLET, FILM COATED, EXTENDED RELEASE ORAL at 09:11

## 2017-11-27 RX ADMIN — ACETAMINOPHEN 1000 MG: 10 INJECTION, SOLUTION INTRAVENOUS at 01:11

## 2017-11-27 RX ADMIN — CELECOXIB 100 MG: 100 CAPSULE ORAL at 09:11

## 2017-11-27 RX ADMIN — ACETAMINOPHEN 1000 MG: 10 INJECTION, SOLUTION INTRAVENOUS at 09:11

## 2017-11-27 NOTE — BRIEF OP NOTE
[facility]  Brief Operative Note    SUMMARY     Surgery Date: 11/27/2017     Surgeon(s) and Role:     * Renny Linda MD - Primary    Assisting Surgeon: None    Pre-op Diagnosis:  Arthritis of knee, left [M17.12]    Post-op Diagnosis:  Post-Op Diagnosis Codes:     * Arthritis of knee, left [M17.12]    Procedure(s) (LRB):  ARTHROPLASTY-KNEE (Left)    Anesthesia: General    Description of Procedure: L TKA    Description of the findings of the procedure: DICTATED [id number]    Estimated Blood Loss:0       Specimens:   Specimen (12h ago through future)    None

## 2017-11-27 NOTE — PLAN OF CARE
Problem: Physical Therapy Goal  Goal: Physical Therapy Goal  Goals to be met by: 2017    Patient will increase functional independence with mobility by performin. Supine to sit with supervision  2. Sit to supine with supervision  3. Sit to stand transfer with Stand-by Assistance  4. Gait  x 250 feet with Stand-by Assistance using Rolling Walker.   5. Lower extremity exercise program x10 reps, with supervision    Outcome: Ongoing (interventions implemented as appropriate)  PT evaluation complete. Recommend home with HHPT at discharge.    I certify that I was present in the room directing the student in service delivery and guiding them using my skilled judgment. As the co-signing therapist I have reviewed the students documentation and am responsible for the treatment, assessment, and plan.     Jessica LeJeune, PT, DPT

## 2017-11-27 NOTE — ANESTHESIA POSTPROCEDURE EVALUATION
"Anesthesia Post Evaluation    Patient: Mor Hill    Procedure(s) Performed: Procedure(s) (LRB):  ARTHROPLASTY-KNEE (Left)    Final Anesthesia Type: spinal  Patient location during evaluation: PACU  Patient participation: Yes- Able to Participate  Level of consciousness: awake and alert  Post-procedure vital signs: reviewed and stable  Pain management: adequate  Airway patency: patent  PONV status at discharge: No PONV  Anesthetic complications: no      Cardiovascular status: hemodynamically stable  Respiratory status: unassisted and room air  Hydration status: euvolemic  Follow-up not needed.        Visit Vitals  BP (!) 135/59 (BP Location: Left arm, Patient Position: Lying)   Pulse 81   Temp 36.1 °C (97 °F) (Axillary)   Resp 17   Ht 5' 6" (1.676 m)   Wt 79.4 kg (175 lb)   SpO2 95%   BMI 28.25 kg/m²       Pain/Yvonne Score: Pain Assessment Performed: Yes (11/27/2017 10:10 AM)  Presence of Pain: denies (11/27/2017 10:10 AM)  Pain Rating Prior to Med Admin: 0 (11/27/2017  1:35 PM)  Yvonne Score: 9 (11/27/2017 10:10 AM)      "

## 2017-11-27 NOTE — PT/OT/SLP EVAL
"Physical Therapy  Evaluation/Treatment    Mor Hill   MRN: [id number]   Admitting Diagnosis: S/P total knee arthroplasty, left    PT Received On: 11/27/17  PT Start Time: 1343     PT Stop Time: 1425    PT Total Time (min): 42 min       Billable Minutes:  Evaluation 15, Gait Training 12 and Therapeutic Activity 15    Diagnosis: S/P total knee arthroplasty, left    Past Medical History:   Diagnosis Date    Coronary artery disease     Heart attack     Hypertension     Thyroid disease     Vertigo       Past Surgical History:   Procedure Laterality Date    bilateral hammer toe      CARDIAC SURGERY      bypass    HERNIA REPAIR      left RCR      LUNG REMOVAL, PARTIAL      ROTATOR CUFF REPAIR      left       Referring physician: Renny Linda MD  Date referred to PT: 11/27/2017    General Precautions: Standard, fall  Orthopedic Precautions: LLE weight bearing as tolerated     Do you have any cultural, spiritual, Buddhist conflicts, given your current situation?: none    Patient History:  Lives With: spouse  Living Arrangements: house  Home Layout: Able to live on 1st floor  Transportation Available: family or friend will provide  Living Environment Comment: Lives in a [facility] with 0STE. Pt's wife lives at home, but has recently broken a toe and is limited in her ability to help.  Equipment Currently Used at Home: walker, rolling (PRN for dizziness)    Previous Level of Function:  Ambulation Skills: independent (occasional use of walker when dizzy, but hasn't used in a while)  Transfer Skills: independent  ADL Skills: independent  Work/Leisure Activity: independent    Subjective:  Communicated with RN prior to session.  Pt stated, "I am hard of hearing, but I'll do my best to do what you say."  Chief Complaint: tightness above the knee cap  Patient goals: to go home    Pain/Comfort  Pain Rating 1: 0/10      Objective:   Patient found with: aguila catheter, oxygen, SCD, peripheral IV, perineural catheter (Alicia drain)   "   Cognitive Exam:  Oriented to: Person, Place, Time and Situation    Follows Commands/attention: Follows multistep  commands  Communication: clear/fluent, hard of hearing  Safety awareness/insight to disability: understands he just had surgery and needs to protect it, however, feels that it is stronger than it really is    Physical Exam:  Postural examination/scapula alignment: Rounded shoulder and Head forward    Skin integrity: Visible skin intact  Edema: None noted     Sensation:   Intact    Upper Extremity Range of Motion:  Right Upper Extremity: WFL  Left Upper Extremity: WFL    Upper Extremity Strength:  Right Upper Extremity: 4+/5 throughout  Left Upper Extremity: 4/5 throughout    Lower Extremity Range of Motion:  Right Lower Extremity: WNL  Left Lower Extremity: WFL except knee - 30 extension to 100 degrees flexion    Lower Extremity Strength:  Right Lower Extremity: WFL  Left Lower Extremity: 4/5 hip flexion, knee extension 3-/5, ankle DF/PF 4/5    Gross motor coordination: WFL    Functional Mobility:  Bed Mobility:  Rolling/Turning to Left: Contact guard assistance  Scooting/Bridging: Contact Guard Assistance  Supine to Sit: Minimum Assistance (for line management)  Sit to Supine: Minimum Assistance (for line management)    Transfers:  Sit <> Stand Assistance: Moderate Assistance, Minimum Assistance (Mod A x2 people for first attempt, min A for second attempt)  Sit <> Stand Assistive Device: Rolling Walker    Gait:   Gait Distance: 10 feet  Assistance 1: Contact Guard Assistance  Gait Assistive Device: Rolling walker  Gait Pattern: 3-point gait (side stepping at EOB)  Gait Deviation(s): decreased dacia, increased time in double stance, decreased velocity of limb motion, decreased step length    Balance:   Static Sit: GOOD: Takes MODERATE challenges from all directions  Dynamic Sit: GOOD: Maintains balance through MODERATE excursions of active trunk movement  Static Stand: FAIR+: Takes MINIMAL challenges  "from all directions  Dynamic stand: POOR: N/A    Therapeutic Activities and Exercises:  - Sit<> stand x2 with mod assist for first attempt and min assist for second attempt. Able to maintain standing with CGA at second attempt.  - Pt cued to use RLE more to stand and slowly shift weight toward LLE due to WBAT status and cued for hand placement with standing and sitting  - Performed side-stepping to R and L 10 ft at EOB    AM-PAC 6 CLICK MOBILITY  How much help from another person does this patient currently need?   1 = Unable, Total/Dependent Assistance  2 = A lot, Maximum/Moderate Assistance  3 = A little, Minimum/Contact Guard/Supervision  4 = None, Modified [place]/Independent    Turning over in bed (including adjusting bedclothes, sheets and blankets)?: 3  Sitting down on and standing up from a chair with arms (e.g., wheelchair, bedside commode, etc.): 3  Moving from lying on back to sitting on the side of the bed?: 3  Moving to and from a bed to a chair (including a wheelchair)?: 2  Need to walk in hospital room?: 2  Climbing 3-5 steps with a railing?: 2  Total Score: 15     AM-PAC Raw Score CMS G-Code Modifier Level of Impairment Assistance   6 % Total / Unable   7 - 9 CM 80 - 100% Maximal Assist   10 - 14 CL 60 - 80% Moderate Assist   15 - 19 CK 40 - 60% Moderate Assist   20 - 22 CJ 20 - 40% Minimal Assist   23 CI 1-20% SBA / CGA   24 CH 0% Independent/ Mod I     Patient left HOB elevated with all lines intact and call button in reach.    Assessment:   Mor Hill is a 71 y.o. male with a medical diagnosis of S/P total knee arthroplasty, left and presents with functional weakness and impaired endurance which limits his functional mobility and ambulation. Pt noted crepitus in R knee that contributed to difficulty with standing. Pt reported slight dizziness, but reports that he has dealt with it for a long time and uses a walker that he "doesn't trust" for dizzy spells. Pt would benefit from " continued skilled PT to increase independence with functional mobility and improve gait.    Rehab identified problem list/impairments: Rehab identified problem list/impairments: weakness, impaired endurance, gait instability, impaired functional mobilty, impaired self care skills, impaired balance, decreased lower extremity function, decreased safety awareness, decreased ROM, impaired joint extensibility, impaired cardiopulmonary response to activity    Rehab potential is good.    Activity tolerance: Good    Discharge recommendations: Discharge Facility/Level Of Care Needs: home health PT     Barriers to discharge: Barriers to Discharge: Decreased caregiver support    Equipment recommendations: Equipment Needed After Discharge: walker, rolling, bedside commode     GOALS:    Physical Therapy Goals        Problem: Physical Therapy Goal    Goal Priority Disciplines Outcome Goal Variances Interventions   Physical Therapy Goal     PT/OT, PT Ongoing (interventions implemented as appropriate)     Description:  Goals to be met by: 2017    Patient will increase functional independence with mobility by performin. Supine to sit with supervision  2. Sit to supine with supervision  3. Sit to stand transfer with Stand-by Assistance  4. Gait  x 250 feet with Stand-by Assistance using Rolling Walker.   5. Lower extremity exercise program x10 reps, with supervision                      PLAN:    Patient to be seen BID to address the above listed problems via gait training, therapeutic activities, therapeutic exercises  Plan of Care expires: 17  Plan of Care reviewed with: patient    Venessa Catalan, SPT  2017     I certify that I was present in the room directing the student in service delivery and guiding them using my skilled judgment. As the co-signing therapist I have reviewed the students documentation and am responsible for the treatment, assessment, and plan.     Jessica LeJeune, PT, DPT

## 2017-11-27 NOTE — OP NOTE
DATE OF PROCEDURE:  11/27/2017.    ATTENDING PHYSICIAN:  Renny Linda M.D.    FIRST ASSISTANT:  Amado Wolf.    PREOPERATIVE DIAGNOSIS:  Bone-on-bone osteoarthritis, left knee.    POSTOPERATIVE DIAGNOSIS:  Bone-on-bone osteoarthritis, left knee.    PROCEDURE PERFORMED:  Left total knee arthroplasty.    COMPONENTS UTILIZED:  A MicroPort total knee arthroplasty system, size 5 femur,   size 5 tibia, size 29 mm patella, size 10 mm spacer.    ESTIMATED BLOOD LOSS:  Zero    IV FLUID:  Crystalloid.    ANESTHESIA:  General anesthesia with spinal augmentation.    PROCEDURE IN DETAIL:  The patient was placed on the operating table in the   supine position.  The knee was prepped and draped in the usual sterile manner   for surgery.  An anterior approach was undertaken to the knee and carried down   sharply through the skin.  The medial parapatellar tissues were released and the   patella was taken laterally.  The knee was flexed to 90 degrees.  There was   bone-on-bone osteoarthritis throughout the patellofemoral joint and around the   medial femoral condyle.  A drill was used to gain access to the femur and   intramedullary magen was inserted up the femoral canal.  A cutting jig was pinned   into position such that it would make a 5 degree valgus cut and take 9 mm of   distal bone.  It was checked secondarily.  The cuts were made without any   difficulty.  We now sized the femur, it is sized to a 5.  Size 5 cutting jig was   pinned into position and the cuts were made.  A femoral trial was placed.  It   fit ideally.  At this point, we turned our attention to the tibia.  The tibia   was subluxed anteriorly.  We were very careful not to damage the posterior   neurovascular structures.  A cutting jig was pinned into position such that it   would make a neutral cut and take 2 mm of medial bone.  It was checked   secondarily and the cut was made.  We placed a femoral trial and a tibial trial.    The construct had full extension,  full flexion, symmetric flexion and   extension gaps.  We copiously irrigated.  We broached the tibia.  We turned our   attention to the patella.  The patella was very thin and had full thickness   cartilaginous loss.  A patellar cut was made.  A button was placed.  The   construct trialed perfectly with no liftoff.  We pulse and irrigated and dried   the bony surfaces.  We mixed our bone cement and cemented first the tibia, next   the femur, and finally the patella.  All excess cement was removed at the time   of cementation and the construct was held in full extension until the cement had   completely hardened.  We copiously irrigated again.  The actual spacer was   tapped into position.  A drain was brought out laterally.  We closed the   extensor mechanism with a combination of #2 FiberWire and a running Quill   stitch.  We irrigated and closed the subcutaneous with 2-0 Vicryl and the skin   with PDS.  Sterile dressings were applied and the patient was taken to Recovery   Room in stable condition.      JATINDER  dd: 11/27/2017 08:25:28 (CST)  td: 11/27/2017 09:34:09 (CST)  Doc ID   #[id number]  Job ID #[id number]    CC:

## 2017-11-27 NOTE — PLAN OF CARE
Pt awake alert and oriented, cheerful and making jokes, very [place] but bilateral hearing aids in, vs stable, no complaints of pain, dressing to L knee cdi, Q ball in place to L knee, cryotherapy in use, drain intact, SCD to R leg and L foot on, tolerated clear liquids, IV fluids infusing, aguila catheter intact and draining freely. Updated pt's wife, Alyssa. Pt able to bend and lift R knee all the way to chest and able to slightly bend L knee but able to wiggle toes and bend L ankle. Ok per Dr. Bailey to send pt to the floor. Pt transported via bed to room 422. Belongings brought with pt to room. Call light within reach. Report given to MARGARITA Fu.

## 2017-11-27 NOTE — PLAN OF CARE
Admitted to DSU. Warm blankets and socks provided. Assessment complete. Scd wrap placed on right leg only.

## 2017-11-27 NOTE — ANESTHESIA PROCEDURE NOTES
Peripheral    Patient location during procedure: pre-op   Block not for primary anesthetic.  Reason for block: at surgeon's request and post-op pain management   Post-op Pain Location: Left knee   Start time: 11/27/2017 6:35 AM  Timeout: 11/27/2017 6:35 AM   End time: 11/27/2017 6:50 AM  Staffing  Anesthesiologist: RITESH JANG  Performed: anesthesiologist   Preanesthetic Checklist  Completed: patient identified, site marked, surgical consent, pre-op evaluation, timeout performed, IV checked, risks and benefits discussed and monitors and equipment checked  Peripheral Block  Patient position: supine  Prep: ChloraPrep and site prepped and draped  Patient monitoring: heart rate, cardiac monitor, continuous pulse ox, continuous capnometry and frequent blood pressure checks  Block type: adductor canal  Laterality: left  Injection technique: continuous  Needle  Needle type: Tuohy   Needle gauge: 17 G  Needle length: 3.5 in  Needle localization: anatomical landmarks and ultrasound guidance  Catheter type: spring wound  Catheter size: 19 G  Test dose: lidocaine 1.5% with Epi 1-to-200,000 and negative   -ultrasound image captured on disc.  Assessment  Injection assessment: negative aspiration, negative parasthesia and local visualized surrounding nerve  Paresthesia pain: none  Heart rate change: no  Slow fractionated injection: yes  Medications:  Bolus administered: 30 mL of 0.5 ropivacaine  Epinephrine added: none  Additional Notes  VSS.  [facility] RN monitoring vitals throughout procedure.  Patient tolerated procedure well.

## 2017-11-27 NOTE — PROGRESS NOTES
Ok per Dr. Bailey to send pt to the floor. Pt bending and lifting R knee all the way to his chest and wiggling toes to L foot and bending L ankle.

## 2017-11-27 NOTE — H&P
PCP: Primary Doctor No    History & Physical    Chief Complaint: s/p Left total knee arthroplasty    History of Present Illness:  Patient is a 71 y.o. male admitted to Hospitalist Service from [facility] s/p left total knee arthroplasty performed by Dr. Linda. Patient reportedly has past medical history significant for hypertension, hyperlipidemia, CAD and hypothyroidism. Post-operatively, patient denied chest pain, shortness of breath, abdominal pain, nausea, vomiting, headache, vision changes, focal neuro-deficits, cough or fever.    Past Medical History:   Diagnosis Date    Coronary artery disease     Heart attack     Hypertension     Thyroid disease     Vertigo      Past Surgical History:   Procedure Laterality Date    bilateral hammer toe      CARDIAC SURGERY      bypass    HERNIA REPAIR      left RCR      LUNG REMOVAL, PARTIAL      ROTATOR CUFF REPAIR      left     History reviewed. No pertinent family history.  Social History   Substance Use Topics    Smoking status: Former Smoker    Smokeless tobacco: Never Used    Alcohol use Yes      Comment: daily boubon and cola      Review of patient's allergies indicates:   Allergen Reactions    Iodine and iodide containing products     Pentothal [thiopental sodium]      Trouble waking up, went into a deep sleep     PTA Medications   Medication Sig    atenolol (TENORMIN) 25 MG tablet Take 25 mg by mouth every evening.     atorvastatin (LIPITOR) 20 MG tablet Take 20 mg by mouth once daily.    clopidogrel (PLAVIX) 75 mg tablet Take 75 mg by mouth once daily.    cyclobenzaprine (FLEXERIL) 10 MG tablet Take 10 mg by mouth 2 (two) times daily as needed.    furosemide (LASIX) 20 MG tablet Take 20 mg by mouth once daily.    levothyroxine (SYNTHROID) 50 MCG tablet Take 50 mcg by mouth once daily.    meclizine (ANTIVERT) 25 mg tablet Take 25 mg by mouth.    omeprazole (PRILOSEC) 20 MG capsule Take 20 mg by mouth once daily.    tramadol (ULTRAM) 50  mg tablet Take 1 tablet (50 mg total) by mouth every 6 (six) hours as needed.     Review of Systems:  Constitutional: no fever or chills  Eyes: no visual changes  Ears, nose, mouth, throat, and face: no nasal congestion or sore throat  Respiratory: no cough or shorness of breath  Cardiovascular: no chest pain or palpitations  Gastrointestinal: no nausea or vomiting, no abdominal pain or change in bowel habits  Genitourinary: no hematuria or dysuria  Integument/breast: no rash or pruritis  Hematologic/lymphatic: no easy bruising or lymphadenopathy  Musculoskeletal: no arthralgias or myalgias. See HPI  Neurological: no seizures or tremors.  Behavioral/Psych: no auditory or visual hallucinations  Endocrine: no heat or cold intolerance     OBJECTIVE:     Vital Signs (Most Recent)  Temp: 97.6 °F (36.4 °C) (11/27/17 1556)  Pulse: 77 (11/27/17 1556)  Resp: 17 (11/27/17 1556)  BP: 121/68 (11/27/17 1556)  SpO2: 95 % (11/27/17 1556)    Physical Exam:  General appearance: well developed, appears stated age  Head: normocephalic, atraumatic  Eyes:  conjunctivae/corneas clear. PERRL.  Nose: Nares normal. Septum midline.  Throat: lips, mucosa, and tongue normal; teeth and gums normal, no throat erythema.  Neck: supple, symmetrical, trachea midline, no JVD and thyroid not enlarged, symmetric, no tenderness/mass/nodules  Lungs:  clear to auscultation bilaterally and normal respiratory effort  Chest wall: no tenderness  Heart: regular rate and rhythm, S1, S2 normal, no murmur, click, rub or gallop  Abdomen: soft, non-tender non-distented; bowel sounds normal; no masses,  no organomegaly  Extremities: no cyanosis, clubbing or edema. Left knee dressing C/D/I.  Pulses: 2+ and symmetric  Skin: Skin color, texture, turgor normal. No rashes or lesions.  Lymph nodes: Cervical, supraclavicular, and axillary nodes normal.  Neurologic: Normal strength and tone. No focal numbness or weakness. CNII-XII intact.      Laboratory:   CBC: No results  for input(s): WBC, RBC, HGB, HCT, PLT, MCV, MCH, MCHC in the last 168 hours.  CMP: No results for input(s): GLU, CALCIUM, ALBUMIN, PROT, NA, K, CO2, CL, BUN, CREATININE, ALKPHOS, ALT, AST, BILITOT in the last 168 hours.    Diagnostic Results: None    Assessment/Plan:     Active Hospital Problems    Diagnosis  POA    *S/P total knee arthroplasty, left [Z96.652]  Not Applicable    Osteoarthritis of left knee [M17.12]  Continue to follow Orthopedic recommendations.  Needs aggressive incentive spirometry.  Follow hemoglobin and hematocrit closely.  Pain control with IV narcotics and antiemetics as needed.  Physical therapy as per Orthopedics protocol with fall precautions.    Yes    Hypertension [I10]  Chronic problem. Will continue chronic medications and monitor for any changes, adjusting as needed.    Yes    Coronary artery disease [I25.10]  Patient with known CAD and monitor for S/Sx of angina/ACS. Continue to monitor on telemetry.    Yes    Thyroid disease [E07.9]  Chronic problem. Will continue chronic medications and monitor for any changes, adjusting as needed.    Yes      DVT prophylaxis: Aspirin 325 mg BID as per Dr. Linda.    Andrea Avendaño MD  Department of Hospital Medicine   [facility]

## 2017-11-27 NOTE — ANESTHESIA PROCEDURE NOTES
Spinal    Diagnosis: Osteoarthritis   Patient location during procedure: OR  Start time: 11/27/2017 7:10 AM  Timeout: 11/27/2017 7:10 AM  End time: 11/27/2017 7:20 AM  Staffing  Anesthesiologist: RITESH JANG  Performed: anesthesiologist   Spinal Block  Patient position: sitting  Prep: ChloraPrep  Patient monitoring: heart rate, cardiac monitor and continuous pulse ox  Approach: midline  Location: L4-5  Injection technique: single shot  CSF Fluid: clear free-flowing CSF  Needle  Needle type: pencil-tip   Needle gauge: 25 G  Needle length: 4 in  Additional Documentation: incremental injection, negative aspiration for heme and no paresthesia on injection  Needle localization: anatomical landmarks  Assessment  Sensory level: T10   Dermatomal levels determined by alcohol wipe  Ease of block: easy  Medications:  Bolus administered: 1.8 mL of 0.75 bupivacaine  Epinephrine added: none  Additional Notes  VSS throughout

## 2017-11-27 NOTE — ANESTHESIA PROCEDURE NOTES
Peripheral    Patient location during procedure: pre-op   Block not for primary anesthetic.  Reason for block: at surgeon's request and post-op pain management   Post-op Pain Location: Left knee   Start time: 11/27/2017 6:50 AM  Timeout: 11/27/2017 6:50 AM   End time: 11/27/2017 6:55 AM  Staffing  Anesthesiologist: RITESH JANG  Performed: anesthesiologist   Preanesthetic Checklist  Completed: patient identified, site marked, surgical consent, pre-op evaluation, timeout performed, IV checked, risks and benefits discussed and monitors and equipment checked  Peripheral Block  Patient position: supine  Prep: ChloraPrep  Patient monitoring: heart rate, cardiac monitor, continuous pulse ox, continuous capnometry and frequent blood pressure checks  Block type: I PACK  Laterality: left  Injection technique: single shot  Needle  Needle type: Stimuplex   Needle gauge: 21 G  Needle length: 4 in  Needle localization: anatomical landmarks and ultrasound guidance   -ultrasound image captured on disc.  Assessment  Injection assessment: negative aspiration, negative parasthesia and local visualized surrounding nerve  Paresthesia pain: none  Heart rate change: no  Slow fractionated injection: yes  Medications:  Bolus administered: 20 mL of 0.5 ropivacaine  Epinephrine added: none  Additional Notes  VSS.  [facility] RN monitoring vitals throughout procedure.  Patient tolerated procedure well.

## 2017-11-27 NOTE — OR NURSING
Belongings brought to PACU except hearing aids. Hearing aids needed for spinal instructions to pt per Ej/Leigh.

## 2017-11-27 NOTE — TRANSFER OF CARE
"Anesthesia Transfer of Care Note    Patient: Mor Hill    Procedure(s) Performed: Procedure(s) (LRB):  ARTHROPLASTY-KNEE (Left)    Patient location: PACU    Anesthesia Type: MAC, spinal and regional    Transport from OR: Transported from OR on 2-3 L/min O2 by NC with adequate spontaneous ventilation    Post pain: adequate analgesia    Post assessment: no apparent anesthetic complications and tolerated procedure well    Post vital signs: stable    Level of consciousness: awake, alert and oriented    Nausea/Vomiting: no nausea/vomiting    Complications: none    Transfer of care protocol was followed      Last vitals:   Visit Vitals  /80 (BP Location: Left arm, Patient Position: Lying)   Pulse 94   Temp 36.7 °C (98.1 °F) (Other (see comments))   Resp 18   Ht 5' 6" (1.676 m)   Wt 79.4 kg (175 lb)   SpO2 (!) 93%   BMI 28.25 kg/m²     "

## 2017-11-27 NOTE — PLAN OF CARE
11/27/17 1326   Patient Assessment/Suction   Level of Consciousness (AVPU) alert   All Lung Fields Breath Sounds clear   PRE-TX-O2-ETCO2   O2 Device (Oxygen Therapy) nasal cannula   Flow (L/min) 2   Oxygen Concentration (%) 28   SpO2 95 %   Pulse Oximetry Type Intermittent   $ Pulse Oximetry - Multiple Charge Pulse Oximetry - Multiple   Ready to Wean/Extubation Screen   FIO2<=50 (chart decimal) 0.28

## 2017-11-27 NOTE — PLAN OF CARE
Problem: Patient Care Overview  Goal: Plan of Care Review  Outcome: Ongoing (interventions implemented as appropriate)  A&Ox4. Longo draining clear,yellow urine. IVF infusing per order. IV antibiotics infused per order. VSS. Remains afebrile throughout shift. Telemetry monitoring continued on my shift (8712) NSR. Remains fall-free throughout shift. Comfort level established. Good pain control with Tylenol IV. Bed low, brakes locked, SR up x2, call light within reach. Verbalized understanding of poc. Open communication facilitated. Will continue to monitor.

## 2017-11-27 NOTE — ANESTHESIA PREPROCEDURE EVALUATION
11/27/2017  Mor Hill is a 71 y.o., male.    Anesthesia Evaluation    I have reviewed the Patient Summary Reports.    I have reviewed the Nursing Notes.   I have reviewed the Medications.     Review of Systems  Anesthesia Hx:  No problems with previous Anesthesia    Social:  Former Smoker    Hematology/Oncology:  Hematology Normal   Oncology Normal     EENT/Dental:EENT/Dental Normal   Cardiovascular:   Hypertension Past MI CAD  CABG/stent     Pulmonary:   Partial lobectomy    Renal/:  Renal/ Normal     Hepatic/GI:  Hepatic/GI Normal    Musculoskeletal:   Arthritis     Neurological:  Neurology Normal    Endocrine:  Endocrine Normal    Dermatological:  Skin Normal    Psych:  Psychiatric Normal           Physical Exam  General:  Well nourished    Airway/Jaw/Neck:  Airway Findings: Mouth Opening: Normal Tongue: Normal  General Airway Assessment: Adult  Mallampati: I  TM Distance: Normal, at least 6 cm        Eyes/Ears/Nose:  EYES/EARS/NOSE FINDINGS: Normal   Dental:  DENTAL FINDINGS: Normal   Chest/Lungs:  Chest/Lungs Findings: Clear to auscultation, Normal Respiratory Rate     Heart/Vascular:  Heart Findings: Rate: Normal  Rhythm: Regular Rhythm  Sounds: Normal     Abdomen:  Abdomen Findings: Normal    Musculoskeletal:  Musculoskeletal Findings: Normal   Skin:  Skin Findings: Normal    Mental Status:  Mental Status Findings: Normal        Anesthesia Plan  Type of Anesthesia, risks & benefits discussed:  Anesthesia Type:  spinal  Patient's Preference:   Intra-op Monitoring Plan: standard ASA monitors  Intra-op Monitoring Plan Comments:   Post Op Pain Control Plan: multimodal analgesia, peripheral nerve block and IV/PO Opioids PRN  Post Op Pain Control Plan Comments:   Induction:    Beta Blocker:  Patient is on a Beta-Blocker and has received one dose within the past 24 hours (No further documentation  required).       Informed Consent: Patient understands risks and agrees with Anesthesia plan.  Questions answered. Anesthesia consent signed with patient.  ASA Score: 3     Day of Surgery Review of History & Physical: I have interviewed and examined the patient. I have reviewed the patient's H&P dated:  There are no significant changes.  H&P update referred to the surgeon.         Ready For Surgery From Anesthesia Perspective.

## 2017-11-28 LAB
ANION GAP SERPL CALC-SCNC: 6 MMOL/L
BASOPHILS # BLD AUTO: 0 K/UL
BASOPHILS # BLD AUTO: 0 K/UL
BASOPHILS NFR BLD: 0.1 %
BASOPHILS NFR BLD: 0.1 %
BUN SERPL-MCNC: 14 MG/DL
CALCIUM SERPL-MCNC: 8.3 MG/DL
CHLORIDE SERPL-SCNC: 103 MMOL/L
CO2 SERPL-SCNC: 27 MMOL/L
CREAT SERPL-MCNC: 1 MG/DL
DIFFERENTIAL METHOD: ABNORMAL
DIFFERENTIAL METHOD: ABNORMAL
EOSINOPHIL # BLD AUTO: 0 K/UL
EOSINOPHIL # BLD AUTO: 0 K/UL
EOSINOPHIL NFR BLD: 0 %
EOSINOPHIL NFR BLD: 0 %
ERYTHROCYTE [DISTWIDTH] IN BLOOD BY AUTOMATED COUNT: 13.2 %
ERYTHROCYTE [DISTWIDTH] IN BLOOD BY AUTOMATED COUNT: 13.2 %
EST. GFR  (AFRICAN AMERICAN): >60 ML/MIN/1.73 M^2
EST. GFR  (NON AFRICAN AMERICAN): >60 ML/MIN/1.73 M^2
GLUCOSE SERPL-MCNC: 148 MG/DL
HCT VFR BLD AUTO: 35.4 %
HCT VFR BLD AUTO: 35.4 %
HGB BLD-MCNC: 12.1 G/DL
HGB BLD-MCNC: 12.1 G/DL
LYMPHOCYTES # BLD AUTO: 0.5 K/UL
LYMPHOCYTES # BLD AUTO: 0.5 K/UL
LYMPHOCYTES NFR BLD: 3.3 %
LYMPHOCYTES NFR BLD: 3.3 %
MCH RBC QN AUTO: 32 PG
MCH RBC QN AUTO: 32 PG
MCHC RBC AUTO-ENTMCNC: 34.1 G/DL
MCHC RBC AUTO-ENTMCNC: 34.1 G/DL
MCV RBC AUTO: 94 FL
MCV RBC AUTO: 94 FL
MONOCYTES # BLD AUTO: 1.1 K/UL
MONOCYTES # BLD AUTO: 1.1 K/UL
MONOCYTES NFR BLD: 7.8 %
MONOCYTES NFR BLD: 7.8 %
NEUTROPHILS # BLD AUTO: 12.3 K/UL
NEUTROPHILS # BLD AUTO: 12.3 K/UL
NEUTROPHILS NFR BLD: 88.8 %
NEUTROPHILS NFR BLD: 88.8 %
PLATELET # BLD AUTO: 210 K/UL
PLATELET # BLD AUTO: 210 K/UL
PMV BLD AUTO: 8.4 FL
PMV BLD AUTO: 8.4 FL
POTASSIUM SERPL-SCNC: 4.4 MMOL/L
RBC # BLD AUTO: 3.77 M/UL
RBC # BLD AUTO: 3.77 M/UL
SODIUM SERPL-SCNC: 136 MMOL/L
WBC # BLD AUTO: 13.9 K/UL
WBC # BLD AUTO: 13.9 K/UL

## 2017-11-28 PROCEDURE — 12000002 HC ACUTE/MED SURGE SEMI-PRIVATE ROOM

## 2017-11-28 PROCEDURE — 63600175 PHARM REV CODE 636 W HCPCS: Performed by: ORTHOPAEDIC SURGERY

## 2017-11-28 PROCEDURE — G8988 SELF CARE GOAL STATUS: HCPCS | Mod: CK

## 2017-11-28 PROCEDURE — 25000003 PHARM REV CODE 250: Performed by: ANESTHESIOLOGY

## 2017-11-28 PROCEDURE — 97116 GAIT TRAINING THERAPY: CPT

## 2017-11-28 PROCEDURE — 97165 OT EVAL LOW COMPLEX 30 MIN: CPT

## 2017-11-28 PROCEDURE — 80048 BASIC METABOLIC PNL TOTAL CA: CPT

## 2017-11-28 PROCEDURE — 25000003 PHARM REV CODE 250: Performed by: ORTHOPAEDIC SURGERY

## 2017-11-28 PROCEDURE — 25000003 PHARM REV CODE 250: Performed by: INTERNAL MEDICINE

## 2017-11-28 PROCEDURE — 97530 THERAPEUTIC ACTIVITIES: CPT

## 2017-11-28 PROCEDURE — G8987 SELF CARE CURRENT STATUS: HCPCS | Mod: CK

## 2017-11-28 PROCEDURE — 85025 COMPLETE CBC W/AUTO DIFF WBC: CPT

## 2017-11-28 PROCEDURE — 97110 THERAPEUTIC EXERCISES: CPT

## 2017-11-28 PROCEDURE — 97535 SELF CARE MNGMENT TRAINING: CPT

## 2017-11-28 PROCEDURE — 36415 COLL VENOUS BLD VENIPUNCTURE: CPT

## 2017-11-28 PROCEDURE — 94761 N-INVAS EAR/PLS OXIMETRY MLT: CPT

## 2017-11-28 PROCEDURE — 63600175 PHARM REV CODE 636 W HCPCS: Performed by: ANESTHESIOLOGY

## 2017-11-28 PROCEDURE — 99232 SBSQ HOSP IP/OBS MODERATE 35: CPT | Mod: ,,, | Performed by: INTERNAL MEDICINE

## 2017-11-28 RX ADMIN — FAMOTIDINE 20 MG: 20 TABLET, FILM COATED ORAL at 09:11

## 2017-11-28 RX ADMIN — CELECOXIB 100 MG: 100 CAPSULE ORAL at 08:11

## 2017-11-28 RX ADMIN — CELECOXIB 100 MG: 100 CAPSULE ORAL at 09:11

## 2017-11-28 RX ADMIN — LEVOTHYROXINE SODIUM 50 MCG: 50 TABLET ORAL at 06:11

## 2017-11-28 RX ADMIN — ATORVASTATIN CALCIUM 20 MG: 20 TABLET, FILM COATED ORAL at 08:11

## 2017-11-28 RX ADMIN — CEFAZOLIN SODIUM 1 G: 1 SOLUTION INTRAVENOUS at 12:11

## 2017-11-28 RX ADMIN — OXYCODONE HYDROCHLORIDE 10 MG: 10 TABLET, FILM COATED, EXTENDED RELEASE ORAL at 09:11

## 2017-11-28 RX ADMIN — CLOPIDOGREL BISULFATE 75 MG: 75 TABLET ORAL at 08:11

## 2017-11-28 RX ADMIN — POLYETHYLENE GLYCOL (3350) 17 G: 17 POWDER, FOR SOLUTION ORAL at 08:11

## 2017-11-28 RX ADMIN — ASPIRIN 325 MG ORAL TABLET 325 MG: 325 PILL ORAL at 09:11

## 2017-11-28 RX ADMIN — ACETAMINOPHEN 1000 MG: 10 INJECTION, SOLUTION INTRAVENOUS at 06:11

## 2017-11-28 RX ADMIN — BISACODYL 10 MG: 10 SUPPOSITORY RECTAL at 08:11

## 2017-11-28 RX ADMIN — CEFAZOLIN SODIUM 1 G: 1 SOLUTION INTRAVENOUS at 08:11

## 2017-11-28 RX ADMIN — ATENOLOL 25 MG: 25 TABLET ORAL at 09:11

## 2017-11-28 RX ADMIN — OXYCODONE HYDROCHLORIDE 10 MG: 10 TABLET, FILM COATED, EXTENDED RELEASE ORAL at 08:11

## 2017-11-28 RX ADMIN — ASPIRIN 325 MG ORAL TABLET 325 MG: 325 PILL ORAL at 08:11

## 2017-11-28 RX ADMIN — FAMOTIDINE 20 MG: 20 TABLET, FILM COATED ORAL at 08:11

## 2017-11-28 NOTE — PT/OT/SLP EVAL
Occupational Therapy  Evaluation    Mor Hill   MRN: [id number]   Admitting Diagnosis: S/P total knee arthroplasty, left    OT Date of Treatment: 11/28/17   OT Start Time: 1017  OT Stop Time: 1053  OT Total Time (min): 36 min    Billable Minutes:  Evaluation 8  Self Care/Home Management 18  Therapeutic Activity 10    Diagnosis: S/P total knee arthroplasty, left       Past Medical History:   Diagnosis Date    Coronary artery disease     Heart attack     Hypertension     Thyroid disease     Vertigo       Past Surgical History:   Procedure Laterality Date    bilateral hammer toe      CARDIAC SURGERY      bypass    HERNIA REPAIR      left RCR      LUNG REMOVAL, PARTIAL      ROTATOR CUFF REPAIR      left       Referring physician: Alexei  Date referred to OT: 11/27/17    General Precautions: Standard, fall  Orthopedic Precautions: LLE weight bearing as tolerated  Braces: N/A          Patient History:  Living Environment  Lives With: spouse  Living Arrangements: house  Transportation Available: family or friend will provide  Living Environment Comment: Pt lives with wife in a [facility] with 0 DWIGHT and has a walk-in shower. She is able to help pt at home.  Equipment Currently Used at Home: walker, rolling    Prior level of function:   Bed Mobility/Transfers: independent  Grooming: independent  Bathing: independent  Upper Body Dressing: independent  Lower Body Dressing: independent  Toileting: independent  Home Management Skills: independent  Homemaking Responsibilities:  (shares with wife)  Driving License: Yes  Mode of Transportation: Car  Occupation: Retired  IADL Comments: PTA, pt was (I) with all ADL/IADL's at home and in the community.     Dominant hand: right    Subjective:  Communicated with nurse Nickie prior to session.  Stated patient was cleared for OT today.  Chief Complaint: none  Patient/Family stated goals: To go home soon    Pain/Comfort  Pain Rating 1: 0/10  Location - Side 1: Left  Location -  Orientation 1: generalized  Location 1: knee  Pain Addressed 1: Pre-medicate for activity, Cessation of Activity, Reposition, Distraction  Pain Rating Post-Intervention 1: 3/10    Objective:  Patient found with: perineural catheter, telemetry (CATHIE drain)    Cognitive Exam:  Oriented to: Person, Place, Time and Situation  Follows Commands/attention: Follows multistep  commands  Communication: clear/fluent  Memory:  No Deficits noted  Safety awareness/insight to disability: intact  Coping skills/emotional control: Appropriate to situation    Visual/perceptual:  Intact    Physical Exam:  Postural examination/scapula alignment: Rounded shoulder and Head forward  Skin integrity: Visible skin intact  Edema: None noted     Sensation:   Intact    Upper Extremity Range of Motion:  Right Upper Extremity: WNL  Left Upper Extremity: WFL    Upper Extremity Strength:  Right Upper Extremity: WFL  Left Upper Extremity: WFL   Strength: WNL    Fine motor coordination:   Intact    Gross motor coordination: WFL    Functional Mobility:  Bed Mobility:       Transfers:  Sit <> Stand Assistance: Contact Guard Assistance (cues for hand placement)  Sit <> Stand Assistive Device: Rolling Walker  Toilet Transfer Technique: Stand Pivot  Toilet Transfer Assistance: Contact Guard Assistance (cues for hand placement)  Toilet Transfer Assistive Device: Rolling Walker, bedside commode    Functional Ambulation: Pt walked from bedside chair to bathroom, sidestepping through doorway to simulate his narrow doorway at home with Min A & cues for safe technique, then back to bedside chair using walker with CGA & no LOB noted.    Activities of Daily Living:  Feeding Level of Assistance: Independent    UE Dressing Level of Assistance: Set-up Assistance    LE Dressing Level of Assistance: Set-up Assistance, Contact guard (cues for effective use of AE to don/doff socks and pull underpants over feet)  LE adaptive equipment: Reacher and Sock aid   OT ed pt  "on use of adaptive equipment for LB dressing & safe item retrieval with reacher with demonstration provided.        Toileting Where Assessed: Toilet  Toileting Level of Assistance: Contact guard    Balance:   Static Sit: GOOD+: Takes MAXIMAL challenges from all directions.    Dynamic Sit: GOOD: Maintains balance through MODERATE excursions of active trunk movement  Static Stand: FAIR: Maintains without assist but unable to take challenges  Dynamic stand: FAIR: Needs CONTACT GUARD during gait    Therapeutic Activities and Exercises:  OT ed pt on OT role & POC as well as discharge recommendations.  OT ed patient on safety with walker use for functional mobility with cues for hand placement & sequencing.   OT educated patient on bedside commode transfer techniques using rolling walker.      AM-PAC 6 CLICK ADL  How much help from another person does this patient currently need?  1 = Unable, Total/Dependent Assistance  2 = A lot, Maximum/Moderate Assistance  3 = A little, Minimum/Contact Guard/Supervision  4 = None, Modified [place]/Independent    Putting on and taking off regular lower body clothing? : 3  Bathing (including washing, rinsing, drying)?: 3  Toileting, which includes using toilet, bedpan, or urinal? : 3  Putting on and taking off regular upper body clothing?: 3  Taking care of personal grooming such as brushing teeth?: 3  Eating meals?: 4  Total Score: 19    AM-PAC Raw Score CMS "G-Code Modifier Level of Impairment Assistance   6 % Total / Unable   7 - 9 CM 80 - 100% Maximal Assist   10-14 CL 60 - 80% Moderate Assist   15 - 19 CK 40 - 60% Moderate Assist   20 - 22 CJ 20 - 40% Minimal Assist   23 CI 1-20% SBA / CGA   24 CH 0% Independent/ Mod I       Patient left up in chair with all lines intact, call button in estee, Nickie, nurse notified and Dr. Avendaño present    Assessment:  Mor Hill is a 71 y.o. male with a medical diagnosis of S/P total knee arthroplasty, left and presents with a " decline in functional status due to the below listed impairments, impacting ADLs and functional mobility. Recommend OT treatment to maximize endurance, safety & independence with ADL's & functional mobility..    Rehab identified problem list/impairments: Rehab identified problem list/impairments: weakness, impaired self care skills, decreased ROM, impaired joint extensibility, orthopedic precautions, decreased lower extremity function, pain, gait instability, impaired functional mobilty, impaired endurance    Rehab potential is excellent.    Activity tolerance: Good    Discharge recommendations: Discharge Facility/Level Of Care Needs: home health PT     Barriers to discharge: Barriers to Discharge: Decreased caregiver support    Equipment recommendations: walker, rolling, bedside commode     GOALS:    Occupational Therapy Goals        Problem: Occupational Therapy Goal    Goal Priority Disciplines Outcome Interventions   Occupational Therapy Goal     OT, PT/OT Ongoing (interventions implemented as appropriate)    Description:  Goals to be met by: 12/5/17     Patient will increase functional independence with ADLs by performing:    LE Dressing with Set-up Assistance, Contact Guard Assistance and Assistive Devices as needed.  Grooming while standing at sink with Stand-by Assistance and Assistive Devices as needed.  Toileting from bedside commode with Stand-by Assistance and Assistive Devices as needed for hygiene and clothing management.   Toilet transfer to bedside commode with Stand-by Assistance.                      PLAN:  Patient to be seen 3 x/week, 4 x/week to address the above listed problems via self-care/home management, therapeutic activities, therapeutic exercises  Plan of Care expires: 12/05/17  Plan of Care reviewed with: patient    OT G-codes  Functional Assessment Tool Used: AMPAC  Score: 19  Functional Limitation: Self care  Self Care Current Status (): CK  Self Care Goal Status ():  CK    Priscilla Shultz, LOTR  11/28/2017

## 2017-11-28 NOTE — SUBJECTIVE & OBJECTIVE
"Principal Problem:S/P total knee arthroplasty, left    Principal Orthopedic Problem: S/P L TKA    Interval History: none    Review of patient's allergies indicates:   Allergen Reactions    Iodine and iodide containing products     Pentothal [thiopental sodium]      Trouble waking up, went into a deep sleep       Current Facility-Administered Medications   Medication    aspirin tablet 325 mg    atenolol tablet 25 mg    atorvastatin tablet 20 mg    bisacodyl suppository 10 mg    celecoxib capsule 100 mg    clopidogrel tablet 75 mg    famotidine tablet 20 mg    levothyroxine tablet 50 mcg    morphine injection 2 mg    ondansetron disintegrating tablet 8 mg    oxyCODONE 12 hr tablet 10 mg    oxyCODONE immediate release tablet 5 mg    polyethylene glycol packet 17 g    pregabalin capsule 75 mg    ropivacaine (NAROPIN) 0.2% ON-Q C-BLOC 750mL (med + pump)    zolpidem tablet 5 mg     Objective:     Vital Signs (Most Recent):  Temp: 98.3 °F (36.8 °C) (11/28/17 1546)  Pulse: 74 (11/28/17 1546)  Resp: 18 (11/28/17 1546)  BP: 119/60 (11/28/17 1546)  SpO2: (!) 93 % (11/28/17 1546) Vital Signs (24h Range):  Temp:  [97.7 °F (36.5 °C)-98.4 °F (36.9 °C)] 98.3 °F (36.8 °C)  Pulse:  [74-79] 74  Resp:  [16-18] 18  SpO2:  [92 %-96 %] 93 %  BP: (118-139)/(49-71) 119/60     Weight: 79.4 kg (175 lb)  Height: 5' 6" (167.6 cm)  Body mass index is 28.25 kg/m².      Intake/Output Summary (Last 24 hours) at 11/28/17 1603  Last data filed at 11/27/17 1800   Gross per 24 hour   Intake              705 ml   Output             1000 ml   Net             -295 ml       General    Vitals reviewed.  Constitutional: He is oriented to person, place, and time.   Pulmonary/Chest: Effort normal.   Abdominal: Soft. Bowel sounds are normal.   Neurological: He is alert and oriented to person, place, and time.   Psychiatric: He has a normal mood and affect. His behavior is normal.             Left Knee Exam     Comments:  LLE DNVI. Incision " clean and dry. CATHIE removed and dressing changed.      Significant Labs:   CBC:   Recent Labs  Lab 11/28/17  0551   WBC 13.90*  13.90*   HGB 12.1*  12.1*   HCT 35.4*  35.4*     210     CMP:   Recent Labs  Lab 11/28/17  0550      K 4.4      CO2 27   *   BUN 14   CREATININE 1.0   CALCIUM 8.3*   ANIONGAP 6*   EGFRNONAA >60     All pertinent labs within the past 24 hours have been reviewed.    Significant Imaging: X-Ray: I have reviewed all pertinent results/findings and my personal findings are:  Post-op L knee Xrays demonstrate nml TKA

## 2017-11-28 NOTE — PT/OT/SLP PROGRESS
"Physical Therapy  Treatment    Mor Hill   MRN: [id number]   Admitting Diagnosis: S/P total knee arthroplasty, left    PT Received On: 11/28/17  PT Start Time: 0918     PT Stop Time: 0935    PT Total Time (min): 17 min       Billable Minutes:  Gait Training 17    Treatment Type: Treatment  PT/PTA: PT       General Precautions: Standard, fall  Orthopedic Precautions: LLE weight bearing as tolerated     Do you have any cultural, spiritual, Synagogue conflicts, given your current situation?: none    Subjective:  Communicated with RN prior to session.  Pt reported "I am ready to get up."    Pain/Comfort  Pain Rating 1: 0/10    Objective:   Patient found with: peripheral IV, SCD, perineural catheter, Polar ice, telemetry    Functional Mobility:  Bed Mobility:   Supine to Sit: Contact Guard Assistance (for LLE)    Transfers:  Sit <> Stand Assistance: Contact Guard Assistance  Sit <> Stand Assistive Device: Rolling Walker    Gait:   Gait Distance: 250 feet  Assistance 1: Contact Guard Assistance  Gait Assistive Device: Rolling walker  Gait Pattern: 3-point gait  Gait Deviation(s): decreased dacia, increased time in double stance, decreased velocity of limb motion, decreased step length, decreased weight-shifting ability  Pt reported stiffness in posterior knee with initiation of gait. Cues intermittently for appropriate weight bearing through UEs on walker as pt at times placing too much weight on LLE which resulted in instability.     Balance:   Static Sit: FAIR+: Able to take MINIMAL challenges from all directions  Dynamic Sit: FAIR+: Maintains balance through MINIMAL excursions of active trunk motion  Static Stand: FAIR+: Takes MINIMAL challenges from all directions  Dynamic stand: FAIR: Needs CONTACT GUARD during gait     AM-PAC 6 CLICK MOBILITY  How much help from another person does this patient currently need?   1 = Unable, Total/Dependent Assistance  2 = A lot, Maximum/Moderate Assistance  3 = A little, " Minimum/Contact Guard/Supervision  4 = None, Modified [place]/Independent    Turning over in bed (including adjusting bedclothes, sheets and blankets)?: 3  Sitting down on and standing up from a chair with arms (e.g., wheelchair, bedside commode, etc.): 3  Moving from lying on back to sitting on the side of the bed?: 3  Moving to and from a bed to a chair (including a wheelchair)?: 3  Need to walk in hospital room?: 3  Climbing 3-5 steps with a railing?: 1  Total Score: 16    AM-PAC Raw Score CMS G-Code Modifier Level of Impairment Assistance   6 % Total / Unable   7 - 9 CM 80 - 100% Maximal Assist   10 - 14 CL 60 - 80% Moderate Assist   15 - 19 CK 40 - 60% Moderate Assist   20 - 22 CJ 20 - 40% Minimal Assist   23 CI 1-20% SBA / CGA   24 CH 0% Independent/ Mod I     Patient left up in chair with all lines intact and call button in reach.    Assessment:  Mor Hill is a 71 y.o. male with a medical diagnosis of S/P total knee arthroplasty, left and presents with improved gait stability today. Pt with slight L knee pain with gait but able to adjust support on walker to relieve this. He would benefit from continued PT to progress mobility.    Rehab identified problem list/impairments: Rehab identified problem list/impairments: weakness, impaired endurance, impaired functional mobilty, gait instability, impaired balance, impaired self care skills, decreased lower extremity function, decreased safety awareness, pain, decreased ROM, impaired joint extensibility, orthopedic precautions    Rehab potential is good.    Activity tolerance: Good    Discharge recommendations: Discharge Facility/Level Of Care Needs: home health PT     Barriers to discharge: Barriers to Discharge: Decreased caregiver support    Equipment recommendations: Equipment Needed After Discharge: walker, rolling, bedside commode     GOALS:    Physical Therapy Goals        Problem: Physical Therapy Goal    Goal Priority Disciplines Outcome Goal  Variances Interventions   Physical Therapy Goal     PT/OT, PT Ongoing (interventions implemented as appropriate)     Description:  Goals to be met by: 2017    Patient will increase functional independence with mobility by performin. Supine to sit with supervision  2. Sit to supine with supervision  3. Sit to stand transfer with Stand-by Assistance  4. Gait  x 250 feet with Stand-by Assistance using Rolling Walker.   5. Lower extremity exercise program x10 reps, with supervision                      PLAN:    Patient to be seen BID  to address the above listed problems via therapeutic exercises, therapeutic activities, gait training  Plan of Care expires: 17  Plan of Care reviewed with: patient     Mercedesica LeJeune, PT, DPT

## 2017-11-28 NOTE — PLAN OF CARE
Problem: Patient Care Overview  Goal: Plan of Care Review  Outcome: Ongoing (interventions implemented as appropriate)  Pt awake in bed. AAOx3. Pt very [place]. Hearing aids intact. Dx: s/p L knee repair. Plan of care reviewed. Questions answered. Verbalized understanding. Telemetry in place. PT/OT. Shashi. On-Q ball infusing @ 10cc/hr. Longo d/c'd this AM. O2 prn. ACE wrap drsg c/d/i to L knee. SCD/plexi. Neuro checks q4hrs. Cardiac diet. Cryo in place. No c/o pain or distress at this time. Call light in reach. Bed low. Will cont to monitor.

## 2017-11-28 NOTE — PROGRESS NOTES
Removed patient drain at 1525 per M.TALA Christina. 300 documented in drain at this time. Pressure applied for 10 minutes. Pressure dressing applied. Patient tolerated well. Will continue to monitor.

## 2017-11-28 NOTE — PLAN OF CARE
Problem: Occupational Therapy Goal  Goal: Occupational Therapy Goal  Goals to be met by: 12/5/17     Patient will increase functional independence with ADLs by performing:    LE Dressing with Set-up Assistance, Contact Guard Assistance and Assistive Devices as needed.  Grooming while standing at sink with Stand-by Assistance and Assistive Devices as needed.  Toileting from bedside commode with Stand-by Assistance and Assistive Devices as needed for hygiene and clothing management.   Toilet transfer to bedside commode with Stand-by Assistance.    Outcome: Ongoing (interventions implemented as appropriate)  OT evaluation completed today. Goals & care plan established.    RANDOLPH Mckinnon  11/28/2017

## 2017-11-28 NOTE — PLAN OF CARE
11/27/17 2051   Patient Assessment/Suction   Level of Consciousness (AVPU) alert   Respiratory Effort Unlabored   All Lung Fields Breath Sounds diminished   PRE-TX-O2-ETCO2   O2 Device (Oxygen Therapy) nasal cannula   $ Is the patient on Low Flow Oxygen? Yes   Flow (L/min) 2   SpO2 96 %   Pulse Oximetry Type Intermittent   $ Pulse Oximetry - Multiple Charge Pulse Oximetry - Multiple   Pulse 79   Resp 16

## 2017-11-28 NOTE — PLAN OF CARE
11/28/17 1548   Discharge Assessment   Assessment Type Discharge Planning Assessment   Confirmed/corrected address and phone number on facesheet? Yes   Assessment information obtained from? Patient   Expected Length of Stay (days) 2   Communicated expected length of stay with patient/caregiver yes   Prior to hospitilization cognitive status: Alert/Oriented   Prior to hospitalization functional status: Independent   Current cognitive status: Alert/Oriented   Current Functional Status: Assistive Equipment;Needs Assistance   Lives With spouse   Able to Return to Prior Arrangements yes   Is patient able to care for self after discharge? Yes  (with assistance )   Patient currently being followed by outpatient case management? No   Patient currently receives any other outside agency services? No   Equipment Currently Used at Home none   Do you have any problems affording any of your prescribed medications? No  (pharm: [facility] on  [address] )   Is the patient taking medications as prescribed? yes   Does the patient have transportation home? Yes   Transportation Available family or friend will provide   Does the patient receive services at the [facility]? No   Discharge Plan A Home Health   Patient/Family In Agreement With Plan yes     The pt states he bought a RW at [facility] to practice before his surgery but does not feel it is very reliable so is asking that we order him a new one for discharge.  The pt will need a RW, BSC and home health at the time of discharge. I updated the pt's PCP to Dr Carter.....MARGARITA Schulz CM

## 2017-11-28 NOTE — PLAN OF CARE
Problem: Physical Therapy Goal  Goal: Physical Therapy Goal  Goals to be met by: 2017    Patient will increase functional independence with mobility by performin. Supine to sit with supervision  2. Sit to supine with supervision  3. Sit to stand transfer with Stand-by Assistance  4. Gait  x 250 feet with Stand-by Assistance using Rolling Walker.   5. Lower extremity exercise program x10 reps, with supervision     Outcome: Ongoing (interventions implemented as appropriate)  Pt is progressing toward goals.

## 2017-11-28 NOTE — PROGRESS NOTES
Progress Note  Hospital Medicine  Patient Name:Mor Hill  MRN:  [id number]  Patient Class: IP- Inpatient  Admit Date: 11/27/2017  Length of Stay: 1 days  Expected Discharge Date:   Attending Physician: Andrea Avendaño MD  Primary Care Provider:  Primary Doctor No    SUBJECTIVE:     Principal Problem: S/P total knee arthroplasty, left  Initial history of present illness: Patient is a 71 y.o. male admitted to Hospitalist Service from [facility] s/p left total knee arthroplasty performed by Dr. Linda. Patient reportedly has past medical history significant for hypertension, hyperlipidemia, CAD and hypothyroidism. Post-operatively, patient denied chest pain, shortness of breath, abdominal pain, nausea, vomiting, headache, vision changes, focal neuro-deficits, cough or fever.    PMH/PSH/SH/FH/Meds: reviewed.    Symptoms/Review of Systems: Doing well. Pain controlled. Participating with PT. No shortness of breath, cough, chest pain or headache, fever or abdominal pain.     Diet:  Adequate intake.    Activity level: Normal.    Pain:  Patient reports no pain.       OBJECTIVE:   Vital Signs (Most Recent):      Temp: 98.2 °F (36.8 °C) (11/28/17 0753)  Pulse: 79 (11/28/17 0753)  Resp: 17 (11/28/17 0753)  BP: 132/63 (11/28/17 0753)  SpO2: 95 % (11/28/17 0753)       Vital Signs Range (Last 24H):  Temp:  [97 °F (36.1 °C)-98.2 °F (36.8 °C)]   Pulse:  [74-81]   Resp:  [15-20]   BP: (118-139)/(49-71)   SpO2:  [92 %-96 %]     Weight: 79.4 kg (175 lb)  Body mass index is 28.25 kg/m².    Intake/Output Summary (Last 24 hours) at 11/28/17 0948  Last data filed at 11/27/17 1800   Gross per 24 hour   Intake             1455 ml   Output             1000 ml   Net              455 ml     Physical Examination:  General appearance: well developed, appears stated age  Head: normocephalic, atraumatic  Eyes:  conjunctivae/corneas clear. PERRL.  Nose: Nares normal. Septum midline.  Throat: lips, mucosa, and tongue normal; teeth and gums normal, no  throat erythema.  Neck: supple, symmetrical, trachea midline, no JVD and thyroid not enlarged, symmetric, no tenderness/mass/nodules  Lungs:  clear to auscultation bilaterally and normal respiratory effort  Chest wall: no tenderness  Heart: regular rate and rhythm, S1, S2 normal, no murmur, click, rub or gallop  Abdomen: soft, non-tender non-distented; bowel sounds normal; no masses,  no organomegaly  Extremities: no cyanosis, clubbing or edema. Left knee dressing C/D/I.  Pulses: 2+ and symmetric  Skin: Skin color, texture, turgor normal. No rashes or lesions.  Lymph nodes: Cervical, supraclavicular, and axillary nodes normal.  Neurologic: Normal strength and tone. No focal numbness or weakness. CNII-XII intact.      CBC:    Recent Labs  Lab 11/28/17  0551   WBC 13.90*  13.90*   RBC 3.77*  3.77*   HGB 12.1*  12.1*   HCT 35.4*  35.4*     210   MCV 94  94   MCH 32.0*  32.0*   MCHC 34.1  34.1   BMP    Recent Labs  Lab 11/28/17  0550   *      K 4.4      CO2 27   BUN 14   CREATININE 1.0   CALCIUM 8.3*      Diagnostic Results:  Microbiology Results (last 7 days)     ** No results found for the last 168 hours. **         Assessment/Plan:      *S/P total knee arthroplasty, left [Z96.652]   Not Applicable    Osteoarthritis of left knee [M17.12]  Continue to follow Orthopedic recommendations.  Needs aggressive incentive spirometry.  Follow hemoglobin and hematocrit closely.  Pain control with PO narcotics and antiemetics as needed.  Physical therapy as per Orthopedics protocol with fall precautions.   Yes    Hypertension [I10]  Chronic problem. Will continue chronic medications and monitor for any changes, adjusting as needed.   Yes    Coronary artery disease [I25.10]  Patient with known CAD and monitor for S/Sx of angina/ACS. Continue to monitor on telemetry.      Yes    Thyroid disease [E07.9]  Chronic problem. Will continue chronic medications and monitor for any changes, adjusting as  needed.   Yes       DVT prophylaxis: Aspirin 325 mg BID as per Dr. Linda.     Andrea Avendaño MD  Department of Hospital Medicine   [facility]

## 2017-11-28 NOTE — PT/OT/SLP PROGRESS
Physical Therapy  Treatment    Mor Hill   MRN: [id number]   Admitting Diagnosis: S/P total knee arthroplasty, left    PT Received On: 11/28/17  PT Start Time: 1312     PT Stop Time: 1345    PT Total Time (min): 33 min       Billable Minutes:  Therapeutic Activity 18 and Therapeutic Exercise 15    Treatment Type: Treatment  PT/PTA: PT     PTA Visit Number: 0       General Precautions: Standard, fall  Orthopedic Precautions: LLE weight bearing as tolerated     Subjective:  Communicated with RN (Abida) prior to session.       Pain/Comfort  Pain Rating 1: 1/10  Location - Side 1: Left  Location 1: knee  Pain Addressed 1: Other (see comments), Cessation of Activity, Distraction, Reposition (cryocuff connected post rx)  Pain Rating Post-Intervention 1: 1/10    Objective:   Patient found with: JOSE drain    Functional Mobility:  Bed Mobility:   Supine to Sit: Contact Guard Assistance (for LLE)  Sit to Supine: Stand by Assistance    Transfers:  Sit <> Stand Assistance: Minimum Assistance  Sit <> Stand Assistive Device: Rolling Walker  Bed <> Chair Technique: Stand Pivot  Bed <> Chair Assistance: Stand By Assistance  Bed <> Chair Assistive Device: Rolling Walker    Gait:   Gait Distance: 200  Assistance 1: Contact Guard Assistance  Gait Assistive Device: Rolling walker  Gait Pattern: 3-point gait  Gait Deviation(s): decreased dacia, increased time in double stance, decreased velocity of limb motion, decreased step length, decreased weight-shifting ability      Therapeutic Activities and Exercises:  SEATED: LAQ, ankle DF/PF, x 10 reps each  SUPINE: SLR, ankle DF, QS, x 20 reps each   Pt was instructed to cont exercises, inge frequent ankle pumps (q 30 min), and emphasize achievement of full knee exten rom asap, do not fold foot of bed for prolonged knee flexion.     AM-PAC 6 CLICK MOBILITY  How much help from another person does this patient currently need?   1 = Unable, Total/Dependent Assistance  2 = A lot,  Maximum/Moderate Assistance  3 = A little, Minimum/Contact Guard/Supervision  4 = None, Modified [place]/Independent    Turning over in bed (including adjusting bedclothes, sheets and blankets)?: 3  Sitting down on and standing up from a chair with arms (e.g., wheelchair, bedside commode, etc.): 3  Moving from lying on back to sitting on the side of the bed?: 3  Moving to and from a bed to a chair (including a wheelchair)?: 3  Need to walk in hospital room?: 3  Climbing 3-5 steps with a railing?: 1  Total Score: 16    AM-PAC Raw Score CMS G-Code Modifier Level of Impairment Assistance   6 % Total / Unable   7 - 9 CM 80 - 100% Maximal Assist   10 - 14 CL 60 - 80% Moderate Assist   15 - 19 CK 40 - 60% Moderate Assist   20 - 22 CJ 20 - 40% Minimal Assist   23 CI 1-20% SBA / CGA   24 CH 0% Independent/ Mod I     Patient left supine with all lines intact and call button in reach.    Assessment:  Mor Hill is a 71 y.o. male with a medical diagnosis of S/P total knee arthroplasty, left.    Rehab identified problem list/impairments: Rehab identified problem list/impairments: weakness, pain, decreased ROM, decreased lower extremity function, gait instability, impaired endurance, impaired balance, impaired skin, impaired joint extensibility    Rehab potential is good.    Activity tolerance: Good    GOALS:    Physical Therapy Goals        Problem: Physical Therapy Goal    Goal Priority Disciplines Outcome Goal Variances Interventions   Physical Therapy Goal     PT/OT, PT Ongoing (interventions implemented as appropriate)     Description:  Goals to be met by: 2017    Patient will increase functional independence with mobility by performin. Supine to sit with supervision  2. Sit to supine with supervision  3. Sit to stand transfer with Stand-by Assistance  4. Gait  x 250 feet with Stand-by Assistance using Rolling Walker.   5. Lower extremity exercise program x10 reps, with supervision                       PLAN:    Patient to be seen BID  to address the above listed problems via gait training, therapeutic activities, therapeutic exercises  Plan of Care expires: 12/26/17  Plan of Care reviewed with: patient         Onel T Raheem, PT  11/28/2017

## 2017-11-28 NOTE — PROGRESS NOTES
"[facility]  Orthopedics  Progress Note    Patient Name: Mor Hill  MRN: [id number]  Admission Date: 11/27/2017  Hospital Length of Stay: 1 days  Attending Provider: Andrea Avendaño MD  Primary Care Provider: Ayden Carter MD  Follow-up For: Procedure(s) (LRB):  ARTHROPLASTY-KNEE (Left)    Post-Operative Day: 1 Day Post-Op  Subjective:     Principal Problem:S/P total knee arthroplasty, left    Principal Orthopedic Problem: S/P L TKA    Interval History: none    Review of patient's allergies indicates:   Allergen Reactions    Iodine and iodide containing products     Pentothal [thiopental sodium]      Trouble waking up, went into a deep sleep       Current Facility-Administered Medications   Medication    aspirin tablet 325 mg    atenolol tablet 25 mg    atorvastatin tablet 20 mg    bisacodyl suppository 10 mg    celecoxib capsule 100 mg    clopidogrel tablet 75 mg    famotidine tablet 20 mg    levothyroxine tablet 50 mcg    morphine injection 2 mg    ondansetron disintegrating tablet 8 mg    oxyCODONE 12 hr tablet 10 mg    oxyCODONE immediate release tablet 5 mg    polyethylene glycol packet 17 g    pregabalin capsule 75 mg    ropivacaine (NAROPIN) 0.2% ON-Q C-BLOC 750mL (med + pump)    zolpidem tablet 5 mg     Objective:     Vital Signs (Most Recent):  Temp: 98.3 °F (36.8 °C) (11/28/17 1546)  Pulse: 74 (11/28/17 1546)  Resp: 18 (11/28/17 1546)  BP: 119/60 (11/28/17 1546)  SpO2: (!) 93 % (11/28/17 1546) Vital Signs (24h Range):  Temp:  [97.7 °F (36.5 °C)-98.4 °F (36.9 °C)] 98.3 °F (36.8 °C)  Pulse:  [74-79] 74  Resp:  [16-18] 18  SpO2:  [92 %-96 %] 93 %  BP: (118-139)/(49-71) 119/60     Weight: 79.4 kg (175 lb)  Height: 5' 6" (167.6 cm)  Body mass index is 28.25 kg/m².      Intake/Output Summary (Last 24 hours) at 11/28/17 1603  Last data filed at 11/27/17 1800   Gross per 24 hour   Intake              705 ml   Output             1000 ml   Net             -295 ml "       General    Vitals reviewed.  Constitutional: He is oriented to person, place, and time.   Pulmonary/Chest: Effort normal.   Abdominal: Soft. Bowel sounds are normal.   Neurological: He is alert and oriented to person, place, and time.   Psychiatric: He has a normal mood and affect. His behavior is normal.             Left Knee Exam     Comments:  LLE DNVI. Incision clean and dry. CATHIE removed and dressing changed.      Significant Labs:   CBC:   Recent Labs  Lab 11/28/17  0551   WBC 13.90*  13.90*   HGB 12.1*  12.1*   HCT 35.4*  35.4*     210     CMP:   Recent Labs  Lab 11/28/17  0550      K 4.4      CO2 27   *   BUN 14   CREATININE 1.0   CALCIUM 8.3*   ANIONGAP 6*   EGFRNONAA >60     All pertinent labs within the past 24 hours have been reviewed.    Significant Imaging: X-Ray: I have reviewed all pertinent results/findings and my personal findings are:  Post-op L knee Xrays demonstrate nml TKA    Assessment/Plan:     * S/P total knee arthroplasty, left    Cont to increase OOB activity  Plan for DC home tomorrow with TALA RAMIREZ  Orthopedics  [facility]

## 2017-11-28 NOTE — ANESTHESIA POST-OP PAIN MANAGEMENT
Acute Pain Service Progress Note    Mor Hill is a 71 y.o., male, [id number].    Surgery:  Left knee arthroplasty     Post Op Day #: 1    Catheter type: perineural   Adductor canal     Infusion type: Ropivacaine 0.2%  10 basal    Problem List:    Active Hospital Problems    Diagnosis  POA    *S/P total knee arthroplasty, left [Z96.652]  Not Applicable    Osteoarthritis of left knee [M17.12]  Yes    Hypertension [I10]  Yes    Coronary artery disease [I25.10]  Yes    Thyroid disease [E07.9]  Yes      Resolved Hospital Problems    Diagnosis Date Resolved POA   No resolved problems to display.       Subjective:     General appearance of alert, oriented, no complaints   Pain with rest: 1        Pain with movement: 2        Side Effects    1. Pruritis No    2. Nausea No    3. Motor Blockade No,     4. Sedation No,    Objective:        Catheter site clean, dry, intact         Vitals   Vitals:    11/28/17 0753   BP: 132/63   Pulse: 79   Resp: 17   Temp: 36.8 °C (98.2 °F)        Labs    Admission on 11/27/2017   Component Date Value Ref Range Status    WBC 11/28/2017 13.90* 3.90 - 12.70 K/uL Final    RBC 11/28/2017 3.77* 4.60 - 6.20 M/uL Final    Hemoglobin 11/28/2017 12.1* 14.0 - 18.0 g/dL Final    Hematocrit 11/28/2017 35.4* 40.0 - 54.0 % Final    MCV 11/28/2017 94  82 - 98 fL Final    MCH 11/28/2017 32.0* 27.0 - 31.0 pg Final    MCHC 11/28/2017 34.1  32.0 - 36.0 g/dL Final    RDW 11/28/2017 13.2  11.5 - 14.5 % Final    Platelets 11/28/2017 210  150 - 350 K/uL Final    MPV 11/28/2017 8.4* 9.2 - 12.9 fL Final    Gran # 11/28/2017 12.3* 1.8 - 7.7 K/uL Final    Lymph # 11/28/2017 0.5* 1.0 - 4.8 K/uL Final    Mono # 11/28/2017 1.1* 0.3 - 1.0 K/uL Final    Eos # 11/28/2017 0.0  0.0 - 0.5 K/uL Final    Baso # 11/28/2017 0.00  0.00 - 0.20 K/uL Final    Gran% 11/28/2017 88.8* 38.0 - 73.0 % Final    Lymph% 11/28/2017 3.3* 18.0 - 48.0 % Final    Mono% 11/28/2017 7.8  4.0 - 15.0 % Final    Eosinophil%  11/28/2017 0.0  0.0 - 8.0 % Final    Basophil% 11/28/2017 0.1  0.0 - 1.9 % Final    Differential Method 11/28/2017 Automated   Final    WBC 11/28/2017 13.90* 3.90 - 12.70 K/uL Final    RBC 11/28/2017 3.77* 4.60 - 6.20 M/uL Final    Hemoglobin 11/28/2017 12.1* 14.0 - 18.0 g/dL Final    Hematocrit 11/28/2017 35.4* 40.0 - 54.0 % Final    MCV 11/28/2017 94  82 - 98 fL Final    MCH 11/28/2017 32.0* 27.0 - 31.0 pg Final    MCHC 11/28/2017 34.1  32.0 - 36.0 g/dL Final    RDW 11/28/2017 13.2  11.5 - 14.5 % Final    Platelets 11/28/2017 210  150 - 350 K/uL Final    MPV 11/28/2017 8.4* 9.2 - 12.9 fL Final    Gran # 11/28/2017 12.3* 1.8 - 7.7 K/uL Final    Lymph # 11/28/2017 0.5* 1.0 - 4.8 K/uL Final    Mono # 11/28/2017 1.1* 0.3 - 1.0 K/uL Final    Eos # 11/28/2017 0.0  0.0 - 0.5 K/uL Final    Baso # 11/28/2017 0.00  0.00 - 0.20 K/uL Final    Gran% 11/28/2017 88.8* 38.0 - 73.0 % Final    Lymph% 11/28/2017 3.3* 18.0 - 48.0 % Final    Mono% 11/28/2017 7.8  4.0 - 15.0 % Final    Eosinophil% 11/28/2017 0.0  0.0 - 8.0 % Final    Basophil% 11/28/2017 0.1  0.0 - 1.9 % Final    Differential Method 11/28/2017 Automated   Final    Sodium 11/28/2017 136  136 - 145 mmol/L Final    Potassium 11/28/2017 4.4  3.5 - 5.1 mmol/L Final    Chloride 11/28/2017 103  95 - 110 mmol/L Final    CO2 11/28/2017 27  23 - 29 mmol/L Final    Glucose 11/28/2017 148* 70 - 110 mg/dL Final    BUN, Bld 11/28/2017 14  8 - 23 mg/dL Final    Creatinine 11/28/2017 1.0  0.5 - 1.4 mg/dL Final    Calcium 11/28/2017 8.3* 8.7 - 10.5 mg/dL Final    Anion Gap 11/28/2017 6* 8 - 16 mmol/L Final    eGFR if African American 11/28/2017 >60  >60 mL/min/1.73 m^2 Final    eGFR if non African American 11/28/2017 >60  >60 mL/min/1.73 m^2 Final        Meds   Current Facility-Administered Medications   Medication Dose Route Frequency Provider Last Rate Last Dose    aspirin tablet 325 mg  325 mg Oral BID Renny Linda MD   325 mg at 11/28/17 2963     atenolol tablet 25 mg  25 mg Oral QHS Andrea Avendaño MD   25 mg at 11/27/17 2106    atorvastatin tablet 20 mg  20 mg Oral Daily Andrea Avendaño MD   20 mg at 11/28/17 0835    bisacodyl suppository 10 mg  10 mg Rectal Daily Renny Linda MD   10 mg at 11/28/17 0835    celecoxib capsule 100 mg  100 mg Oral BID Cedric Bailey MD   100 mg at 11/28/17 0835    clopidogrel tablet 75 mg  75 mg Oral Daily Andrea Avendaño MD   75 mg at 11/28/17 0835    famotidine tablet 20 mg  20 mg Oral BID Renny Linda MD   20 mg at 11/28/17 0835    levothyroxine tablet 50 mcg  50 mcg Oral Before breakfast Andrea Avendaño MD   50 mcg at 11/28/17 0604    morphine injection 2 mg  2 mg Intravenous Q4H PRN Renny Linda MD        ondansetron disintegrating tablet 8 mg  8 mg Oral Q8H PRN Renny Linda MD        oxyCODONE 12 hr tablet 10 mg  10 mg Oral Q12H Cedric Bailey MD   10 mg at 11/28/17 0835    oxyCODONE immediate release tablet 5 mg  5 mg Oral Q3H PRN Cedric Bailey MD        polyethylene glycol packet 17 g  17 g Oral Daily Renny Linda MD   17 g at 11/28/17 0835    pregabalin capsule 75 mg  75 mg Oral Once Cedric Bailey MD        ropivacaine (NAROPIN) 0.2% ON-Q C-BLOC 750mL (med + pump)   Perineural Continuous Cedric Bailey MD 10 mL/hr at 11/27/17 0940      zolpidem tablet 5 mg  5 mg Oral Nightly PRN Renny Linda MD                Assessment:     Pain control adequate    Plan:     Patient doing well, continue present treatment.    Evaluator Cedric Bailey

## 2017-11-29 VITALS
SYSTOLIC BLOOD PRESSURE: 137 MMHG | BODY MASS INDEX: 28.12 KG/M2 | HEIGHT: 66 IN | OXYGEN SATURATION: 95 % | RESPIRATION RATE: 16 BRPM | TEMPERATURE: 99 F | HEART RATE: 84 BPM | WEIGHT: 175 LBS | DIASTOLIC BLOOD PRESSURE: 61 MMHG

## 2017-11-29 LAB
ANION GAP SERPL CALC-SCNC: 7 MMOL/L
BASOPHILS # BLD AUTO: 0 K/UL
BASOPHILS NFR BLD: 0.3 %
BUN SERPL-MCNC: 17 MG/DL
CALCIUM SERPL-MCNC: 8.3 MG/DL
CHLORIDE SERPL-SCNC: 107 MMOL/L
CO2 SERPL-SCNC: 24 MMOL/L
CREAT SERPL-MCNC: 1 MG/DL
DIFFERENTIAL METHOD: ABNORMAL
EOSINOPHIL # BLD AUTO: 0.1 K/UL
EOSINOPHIL NFR BLD: 1.2 %
ERYTHROCYTE [DISTWIDTH] IN BLOOD BY AUTOMATED COUNT: 13.8 %
EST. GFR  (AFRICAN AMERICAN): >60 ML/MIN/1.73 M^2
EST. GFR  (NON AFRICAN AMERICAN): >60 ML/MIN/1.73 M^2
GLUCOSE SERPL-MCNC: 86 MG/DL
HCT VFR BLD AUTO: 34.2 %
HGB BLD-MCNC: 11.8 G/DL
LYMPHOCYTES # BLD AUTO: 1.4 K/UL
LYMPHOCYTES NFR BLD: 15.3 %
MCH RBC QN AUTO: 32.3 PG
MCHC RBC AUTO-ENTMCNC: 34.5 G/DL
MCV RBC AUTO: 94 FL
MONOCYTES # BLD AUTO: 1.1 K/UL
MONOCYTES NFR BLD: 12.3 %
NEUTROPHILS # BLD AUTO: 6.6 K/UL
NEUTROPHILS NFR BLD: 70.9 %
PLATELET # BLD AUTO: 194 K/UL
PMV BLD AUTO: 8.3 FL
POTASSIUM SERPL-SCNC: 4.2 MMOL/L
RBC # BLD AUTO: 3.66 M/UL
SODIUM SERPL-SCNC: 138 MMOL/L
WBC # BLD AUTO: 9.3 K/UL

## 2017-11-29 PROCEDURE — 80048 BASIC METABOLIC PNL TOTAL CA: CPT

## 2017-11-29 PROCEDURE — 97110 THERAPEUTIC EXERCISES: CPT

## 2017-11-29 PROCEDURE — 36415 COLL VENOUS BLD VENIPUNCTURE: CPT

## 2017-11-29 PROCEDURE — 97530 THERAPEUTIC ACTIVITIES: CPT

## 2017-11-29 PROCEDURE — 85025 COMPLETE CBC W/AUTO DIFF WBC: CPT

## 2017-11-29 PROCEDURE — 25000003 PHARM REV CODE 250: Performed by: INTERNAL MEDICINE

## 2017-11-29 PROCEDURE — 99238 HOSP IP/OBS DSCHRG MGMT 30/<: CPT | Mod: ,,, | Performed by: INTERNAL MEDICINE

## 2017-11-29 PROCEDURE — 97116 GAIT TRAINING THERAPY: CPT

## 2017-11-29 PROCEDURE — 25000003 PHARM REV CODE 250: Performed by: ORTHOPAEDIC SURGERY

## 2017-11-29 PROCEDURE — 25000003 PHARM REV CODE 250: Performed by: ANESTHESIOLOGY

## 2017-11-29 RX ORDER — ASPIRIN 325 MG
325 TABLET ORAL 2 TIMES DAILY
Refills: 0
Start: 2017-11-29 | End: 2019-02-06

## 2017-11-29 RX ADMIN — ATORVASTATIN CALCIUM 20 MG: 20 TABLET, FILM COATED ORAL at 09:11

## 2017-11-29 RX ADMIN — FAMOTIDINE 20 MG: 20 TABLET, FILM COATED ORAL at 09:11

## 2017-11-29 RX ADMIN — ASPIRIN 325 MG ORAL TABLET 325 MG: 325 PILL ORAL at 09:11

## 2017-11-29 RX ADMIN — OXYCODONE HYDROCHLORIDE 10 MG: 10 TABLET, FILM COATED, EXTENDED RELEASE ORAL at 09:11

## 2017-11-29 RX ADMIN — POLYETHYLENE GLYCOL (3350) 17 G: 17 POWDER, FOR SOLUTION ORAL at 09:11

## 2017-11-29 RX ADMIN — OXYCODONE HYDROCHLORIDE 5 MG: 5 TABLET ORAL at 06:11

## 2017-11-29 RX ADMIN — CELECOXIB 100 MG: 100 CAPSULE ORAL at 09:11

## 2017-11-29 RX ADMIN — LEVOTHYROXINE SODIUM 50 MCG: 50 TABLET ORAL at 06:11

## 2017-11-29 RX ADMIN — ONDANSETRON 8 MG: 4 TABLET, ORALLY DISINTEGRATING ORAL at 09:11

## 2017-11-29 RX ADMIN — OXYCODONE HYDROCHLORIDE 5 MG: 5 TABLET ORAL at 02:11

## 2017-11-29 RX ADMIN — CLOPIDOGREL BISULFATE 75 MG: 75 TABLET ORAL at 09:11

## 2017-11-29 RX ADMIN — BISACODYL 10 MG: 10 SUPPOSITORY RECTAL at 09:11

## 2017-11-29 NOTE — PLAN OF CARE
"   11/29/17 7929   Discharge Reassessment   Assessment Type Discharge Planning Reassessment     Spoke with the pt's wife regarding the pt's discharge; she wanted the pt to go to a [facility]unit" for a week before coming home. She could not explain to me what "unit" she was referring to but did not want to take the pt home. I explained that if she was talking about a Skilled nursing unit that the pt did not qualify due to his level of function and that if he did qualify it would be under his [employer] benefits since they are the ones who covered his surgery. The pt did not want to go to a SNF, he was agreeable and prepared to discharge home with home health. She states she has had no teaching regarding the pt's care once he is home and does not know what to expect. I told her I would have Junior the joint program navigator come speak with her in the pt's room to answer any questions and concerns.   I spoke with Junior who was agreeable to going to speak with the pt's wife and did state that she did not attend joint class with the pt....MARGARITA Schulz CM   "

## 2017-11-29 NOTE — NURSING
Pt was discharge to home per Md orders, pt received discharge instructions, education handout and prescriptions. Pt IV was removed pt tolerated well no hematoma noted, pt has dressing changed to right knee, clean dry intact , cyrotherapy, walker and BCS sent home with pt. Pt VSS and noted, pt is aware of his follow up appt on 12/12/17 at 11:15am pt and spouse verb understanding, nad noted upon discharge to home

## 2017-11-29 NOTE — PLAN OF CARE
Problem: Patient Care Overview  Goal: Plan of Care Review  Outcome: Ongoing (interventions implemented as appropriate)  A&Ox4. Up with assist to commode. Urinal within reach. IV antibiotics infused per order. VSS. Remains afebrile throughout shift. Cardiac monitoring continued (NSR). Remains fall-free throughout shift. Comfort level established. Good pain control with scheduled medications. Trapeze continued, SCDs intact, cryotherapy intact. Bed low, brakes locked, SR up x2, call light within reach. Verbalized understanding of poc. Open communication facilitated. Will continue to monitor.

## 2017-11-29 NOTE — PLAN OF CARE
11/28/17 2112   Patient Assessment/Suction   Level of Consciousness (AVPU) alert   PRE-TX-O2-ETCO2   O2 Device (Oxygen Therapy) room air   SpO2 95 %   Pulse 72   Resp 18   Pt assessed, no distress noted.

## 2017-11-29 NOTE — DISCHARGE SUMMARY
Discharge Summary  Hospital Medicine    Admit Date: 11/27/2017    Date and Time: 11/29/20177:52 AM    Discharge Attending Physician: Andrea Avendaño MD    Primary Care Physician: Ayden Carter MD    Diagnoses:  Active Hospital Problems    Diagnosis  POA    *S/P total knee arthroplasty, left [Z96.652]  Not Applicable    Osteoarthritis of left knee [M17.12]  Yes    Hypertension [I10]  Yes    Coronary artery disease [I25.10]  Yes    Thyroid disease [E07.9]  Yes      Resolved Hospital Problems    Diagnosis Date Resolved POA   No resolved problems to display.     Discharged Condition: Good    Hospital Course:   Patient is a 71 y.o. male admitted to Hospitalist Service from Operation Room s/p left total knee arthroplasty performed by Dr. Linda. Patient reportedly has past medical history significant for hypertension, hyperlipidemia, CAD and hypothyroidism. Post-operatively, patient denied chest pain, shortness of breath, abdominal pain, nausea, vomiting, headache, vision changes, focal neuro-deficits, cough or fever. Patient was admitted to Hospitalist medicine service. Patient was followed by Dr. Linda. Post-operative, patient did well. Pain adequately controlled. Patient participated with physical therapy. Home health and home physical therapy has been arranged. Fall precautions discussed with the patient. Patient to follow up with primary care physician next week and orthopedic doctor in 2 weeks. Post-operative anti-coagulation as per orthopedics recommendations advised. In case of chest pain, shortness of breath, stroke or stroke like symptoms, high grade fever or any signs or symptoms of surgical site wound infection symptoms, patient to return to nearest emergency room as soon as possible. Patient was discharged home in stable condition with following discharge plan of care.     Consults: Dr. Linda    Significant Diagnostic Studies:     Microbiology Results (last 7 days)     ** No results found for the last  "168 hours. **        Special Treatments/Procedures: as above  Disposition: Home or Self Care    Medications:  Reconciled Home Medications: Current Discharge Medication List      START taking these medications    Details   aspirin 325 MG tablet Take 1 tablet (325 mg total) by mouth 2 (two) times daily.  Refills: 0         CONTINUE these medications which have NOT CHANGED    Details   atenolol (TENORMIN) 25 MG tablet Take 25 mg by mouth every evening.       atorvastatin (LIPITOR) 20 MG tablet Take 20 mg by mouth once daily.      clopidogrel (PLAVIX) 75 mg tablet Take 75 mg by mouth once daily.      cyclobenzaprine (FLEXERIL) 10 MG tablet Take 10 mg by mouth 2 (two) times daily as needed.      furosemide (LASIX) 20 MG tablet Take 20 mg by mouth once daily.      levothyroxine (SYNTHROID) 50 MCG tablet Take 50 mcg by mouth once daily.      meclizine (ANTIVERT) 25 mg tablet Take 25 mg by mouth.      omeprazole (PRILOSEC) 20 MG capsule Take 20 mg by mouth once daily.      tramadol (ULTRAM) 50 mg tablet Take 1 tablet (50 mg total) by mouth every 6 (six) hours as needed.  Qty: 12 tablet, Refills: 0             Discharge Procedure Orders  COMMODE FOR HOME USE   Order Specific Question Answer Comments   Type: Standard    Height: 5' 6" (1.676 m)    Weight: 79.4 kg (175 lb)    Does patient have medical equipment at home? none    Length of need (1-99 months): 11      WALKER FOR HOME USE   Order Specific Question Answer Comments   Type of Walker: Adult (5'4"-6'6")    With wheels? Yes 2 front    Height: 5' 6" (1.676 m)    Weight: 79.4 kg (175 lb)    Length of need (1-99 months): 11    Does patient have medical equipment at home? none    Please check all that apply: Patient's condition impairs ambulation.      Ambulatory referral to Home Health   Referral Priority: Routine Referral Type: Home Health   Referral Reason: Specialty Services Required    Requested Specialty: Home Health Services    Number of Visits Requested: 1      Diet " general   Order Comments: Cardiac/ 2 gram sodium low cholesterol diet     Other restrictions (specify):   Order Comments: Fall precautions     Call MD for:   Order Comments: For worsening symptoms, chest pain, shortness of breath, increased abdominal pain, high grade fever, stroke or stroke like symptoms, immediately go to the nearest Emergency Room or call 911 as soon as possible.       Follow-up Information     Ayden Carter MD In 1 week.    Specialty:  Family Medicine  Contact information:  [address]  [address]  [facility] [address]  [phone]             Renny Linda MD In 2 weeks.    Specialty:  Orthopedic Surgery  Contact information:  [address]  [address]  [place] [address]  [phone]

## 2017-11-29 NOTE — PROGRESS NOTES
SONIA sent patient information to [facility] for authorization and acceptance of home health and medical equipment.  SSC awaiting authorization from [facility] for deliver of bedside commode and rolling walker.  RICARDO Cole and patient's nurse Christo informed.SONIA Reyes    12:43pm SSC spoke to Jennifer with [facility] [phone] regarding authorization of home health and dme.  Per Jennifer, they received patient information.  Paperwork in line for processing.SONIA Reyes    3:30pm Authorization received from Oliva with [facility] for home health and DME.  SSC met with patient and spouse at bedside.  Patient signed patient choice disclosure and delivery ticket.  [facility] delivered rolling walker and BSC to patient's hospital room.  SONIA informed patient's nurse.SONIA Reyes

## 2017-11-29 NOTE — PT/OT/SLP PROGRESS
"Physical Therapy  Treatment    Mor Hill   MRN: [id number]   Admitting Diagnosis: S/P total knee arthroplasty, left    PT Received On: 11/29/17  PT Start Time: 0830     PT Stop Time: 0858    PT Total Time (min): 28 min       Billable Minutes:  Gait [id number], Therapeutic Activity 8 and Therapeutic Exercise 8    Treatment Type: Treatment  PT/PTA: PTA     PTA Visit Number: 1       General Precautions: Standard, fall  Orthopedic Precautions: LLE weight bearing as tolerated   Braces: N/A    Do you have any cultural, spiritual, Jew conflicts, given your current situation?: none    Subjective:  Communicated with nurse Bunn prior to session.  Pt agreeable to session. Pt reporting "popping" in knee during gait, however denied increased pain with popping.    Pain/Comfort  Pain Rating 1:  (did not rate, pt reporting increased pain, sorness and stiffness today)  Location - Side 1: Left  Location - Orientation 1: generalized  Location 1: knee  Pain Addressed 1: Reposition, Distraction, Cessation of Activity, Nurse notified    Objective:   Patient found with: perineural catheter, peripheral IV, SCD, Polar ice    Functional Mobility:  Bed Mobility:   Scooting/Bridging: Contact Guard Assistance  Supine to Sit: Contact Guard Assistance (for L LE)      Transfers:  Sit <> Stand Assistance: Contact Guard Assistance  Sit <> Stand Assistive Device: Rolling Walker  Bed <> Chair Technique: Stand Pivot  Bed <> Chair Assistance: Contact Guard Assistance  Bed <> Chair Assistive Device: Rolling Walker    Gait:   Gait Distance: 60' limited 2' increased pain  Assistance 1: Contact Guard Assistance  Gait Assistive Device: Rolling walker  Gait Pattern: 3-point gait  Gait Deviation(s): decreased dacia, increased time in double stance, decreased velocity of limb motion, decreased step length, decreased weight-shifting ability    Balance:   Static Sit: NORMAL: No deviations seen in posture held statically  Dynamic Sit: NORMAL: No deviations " seen in posture held dynamically  Static Stand: FAIR: Maintains without assist but unable to take challenges  Dynamic stand: FAIR: Needs CONTACT GUARD during gait     Therapeutic Activities and Exercises:  Pt assisted to chair following gait.    Seated BLE therex: AP, LAQ, heel slides, marching x 10 reps each.  Increased time required for initiate tasks 2' L knee pain and stiffness. Pt able to report pain/stiffness relief after gait and therex.      Patient left up in chair with all lines intact, call button in reach and nurse Oni notified.    Assessment:  Mor Hill is a 71 y.o. male with a medical diagnosis of S/P total knee arthroplasty, left and presents with increased pain/stiffness today limiting activity tolerance.    Rehab identified problem list/impairments: Rehab identified problem list/impairments: weakness, impaired endurance, impaired functional mobilty, gait instability, impaired balance, decreased lower extremity function, pain, decreased ROM, impaired joint extensibility, orthopedic precautions    Rehab potential is excellent.    Activity tolerance: Good    Discharge recommendations: Discharge Facility/Level Of Care Needs: home health PT       Equipment recommendations: Equipment Needed After Discharge: walker, rolling, bedside commode     GOALS:    Physical Therapy Goals        Problem: Physical Therapy Goal    Goal Priority Disciplines Outcome Goal Variances Interventions   Physical Therapy Goal     PT/OT, PT Ongoing (interventions implemented as appropriate)     Description:  Goals to be met by: 2017    Patient will increase functional independence with mobility by performin. Supine to sit with supervision  2. Sit to supine with supervision  3. Sit to stand transfer with Stand-by Assistance  4. Gait  x 250 feet with Stand-by Assistance using Rolling Walker.   5. Lower extremity exercise program x10 reps, with supervision                      PLAN:    Patient to be seen BID  to  address the above listed problems via gait training, therapeutic activities, therapeutic exercises  Plan of Care expires: 12/26/17  Plan of Care reviewed with: patient         Mary Benton, PTA  11/29/2017

## 2017-11-29 NOTE — ANESTHESIA POST-OP PAIN MANAGEMENT
Acute Pain Service Progress Note    Mor Hill is a 71 y.o., male, [id number].    Surgery:  L TKR    Post Op Day #: 2    Catheter type: perineural  femoral    Infusion type: Ropivacaine 0.2%  10 basal    Pt c/o pain in anterior knee 5/10 and worse today than yesterday.    Assessment:     Pain control adequate    Plan:     Patient doing well, continue present treatment.    Increase the dose to 12 cc/hr    Evaluator Pb Hernandez

## 2017-11-29 NOTE — SUBJECTIVE & OBJECTIVE
"Principal Problem:S/P total knee arthroplasty, left    Principal Orthopedic Problem: S/P L TKA    Interval History: none    Review of patient's allergies indicates:   Allergen Reactions    Iodine and iodide containing products     Pentothal [thiopental sodium]      Trouble waking up, went into a deep sleep       Current Facility-Administered Medications   Medication    aspirin tablet 325 mg    atenolol tablet 25 mg    atorvastatin tablet 20 mg    bisacodyl suppository 10 mg    celecoxib capsule 100 mg    clopidogrel tablet 75 mg    famotidine tablet 20 mg    levothyroxine tablet 50 mcg    morphine injection 2 mg    ondansetron disintegrating tablet 8 mg    oxyCODONE 12 hr tablet 10 mg    oxyCODONE immediate release tablet 5 mg    polyethylene glycol packet 17 g    pregabalin capsule 75 mg    ropivacaine (NAROPIN) 0.2% ON-Q C-BLOC 750mL (med + pump)    zolpidem tablet 5 mg     Objective:     Vital Signs (Most Recent):  Temp: 97.9 °F (36.6 °C) (11/29/17 0424)  Pulse: 74 (11/29/17 0424)  Resp: 17 (11/29/17 0424)  BP: 118/60 (11/29/17 0424)  SpO2: (!) 92 % (11/29/17 0424) Vital Signs (24h Range):  Temp:  [97.7 °F (36.5 °C)-98.4 °F (36.9 °C)] 97.9 °F (36.6 °C)  Pulse:  [72-79] 74  Resp:  [17-18] 17  SpO2:  [92 %-96 %] 92 %  BP: (109-132)/(58-66) 118/60     Weight: 79.4 kg (175 lb)  Height: 5' 6" (167.6 cm)  Body mass index is 28.25 kg/m².      Intake/Output Summary (Last 24 hours) at 11/29/17 0732  Last data filed at 11/28/17 1800   Gross per 24 hour   Intake              750 ml   Output              800 ml   Net              -50 ml       General    Vitals reviewed.  Constitutional: He is oriented to person, place, and time. He appears well-developed and well-nourished.   Pulmonary/Chest: Effort normal.   Abdominal: Soft. Bowel sounds are normal.   Neurological: He is alert and oriented to person, place, and time.   Psychiatric: He has a normal mood and affect. His behavior is normal.             Left " Knee Exam     Comments:  LLE DNVI. Incision clean and dry.      Significant Labs:   CBC:   Recent Labs  Lab 11/28/17  0551 11/29/17  0607   WBC 13.90*  13.90* 9.30   HGB 12.1*  12.1* 11.8*   HCT 35.4*  35.4* 34.2*     210 194     CMP:   Recent Labs  Lab 11/28/17  0550 11/29/17  0607    138   K 4.4 4.2    107   CO2 27 24   * 86   BUN 14 17   CREATININE 1.0 1.0   CALCIUM 8.3* 8.3*   ANIONGAP 6* 7*   EGFRNONAA >60 >60     All pertinent labs within the past 24 hours have been reviewed.    Significant Imaging: None

## 2017-11-29 NOTE — PROGRESS NOTES
"[facility]  Orthopedics  Progress Note    Patient Name: Mor Hill  MRN: [id number]  Admission Date: 11/27/2017  Hospital Length of Stay: 2 days  Attending Provider: Andrea Avendaño MD  Primary Care Provider: Ayden Carter MD  Follow-up For: Procedure(s) (LRB):  ARTHROPLASTY-KNEE (Left)    Post-Operative Day: 2 Days Post-Op  Subjective:     Principal Problem:S/P total knee arthroplasty, left    Principal Orthopedic Problem: S/P L TKA    Interval History: none    Review of patient's allergies indicates:   Allergen Reactions    Iodine and iodide containing products     Pentothal [thiopental sodium]      Trouble waking up, went into a deep sleep       Current Facility-Administered Medications   Medication    aspirin tablet 325 mg    atenolol tablet 25 mg    atorvastatin tablet 20 mg    bisacodyl suppository 10 mg    celecoxib capsule 100 mg    clopidogrel tablet 75 mg    famotidine tablet 20 mg    levothyroxine tablet 50 mcg    morphine injection 2 mg    ondansetron disintegrating tablet 8 mg    oxyCODONE 12 hr tablet 10 mg    oxyCODONE immediate release tablet 5 mg    polyethylene glycol packet 17 g    pregabalin capsule 75 mg    ropivacaine (NAROPIN) 0.2% ON-Q C-BLOC 750mL (med + pump)    zolpidem tablet 5 mg     Objective:     Vital Signs (Most Recent):  Temp: 97.9 °F (36.6 °C) (11/29/17 0424)  Pulse: 74 (11/29/17 0424)  Resp: 17 (11/29/17 0424)  BP: 118/60 (11/29/17 0424)  SpO2: (!) 92 % (11/29/17 0424) Vital Signs (24h Range):  Temp:  [97.7 °F (36.5 °C)-98.4 °F (36.9 °C)] 97.9 °F (36.6 °C)  Pulse:  [72-79] 74  Resp:  [17-18] 17  SpO2:  [92 %-96 %] 92 %  BP: (109-132)/(58-66) 118/60     Weight: 79.4 kg (175 lb)  Height: 5' 6" (167.6 cm)  Body mass index is 28.25 kg/m².      Intake/Output Summary (Last 24 hours) at 11/29/17 0732  Last data filed at 11/28/17 1800   Gross per 24 hour   Intake              750 ml   Output              800 ml   Net              -50 ml "       General    Vitals reviewed.  Constitutional: He is oriented to person, place, and time. He appears well-developed and well-nourished.   Pulmonary/Chest: Effort normal.   Abdominal: Soft. Bowel sounds are normal.   Neurological: He is alert and oriented to person, place, and time.   Psychiatric: He has a normal mood and affect. His behavior is normal.             Left Knee Exam     Comments:  LLE DNVI. Incision clean and dry.      Significant Labs:   CBC:   Recent Labs  Lab 11/28/17  0551 11/29/17  0607   WBC 13.90*  13.90* 9.30   HGB 12.1*  12.1* 11.8*   HCT 35.4*  35.4* 34.2*     210 194     CMP:   Recent Labs  Lab 11/28/17  0550 11/29/17  0607    138   K 4.4 4.2    107   CO2 27 24   * 86   BUN 14 17   CREATININE 1.0 1.0   CALCIUM 8.3* 8.3*   ANIONGAP 6* 7*   EGFRNONAA >60 >60     All pertinent labs within the past 24 hours have been reviewed.    Significant Imaging: None    Assessment/Plan:     * S/P total knee arthroplasty, left    Stable from ortho stand point and should be able to be DC'd home today with TALA RAMIREZ  Orthopedics  [facility]

## 2017-11-30 ENCOUNTER — PATIENT OUTREACH (OUTPATIENT)
Dept: ADMINISTRATIVE | Facility: CLINIC | Age: 72
End: 2017-11-30

## 2017-11-30 NOTE — PLAN OF CARE
11/30/17 1535   Final Note   Assessment Type Final Discharge Note   Discharge Disposition Home-Health

## 2018-08-30 RX ORDER — MELOXICAM 15 MG/1
TABLET ORAL
Qty: 30 TABLET | Refills: 0 | Status: SHIPPED | OUTPATIENT
Start: 2018-08-30 | End: 2018-10-03 | Stop reason: SDUPTHER

## 2018-10-04 RX ORDER — MELOXICAM 15 MG/1
TABLET ORAL
Qty: 30 TABLET | Refills: 0 | Status: SHIPPED | OUTPATIENT
Start: 2018-10-04 | End: 2019-02-05

## 2019-02-05 ENCOUNTER — OFFICE VISIT (OUTPATIENT)
Dept: ORTHOPEDICS | Facility: CLINIC | Age: 74
End: 2019-02-05
Payer: MEDICARE

## 2019-02-05 VITALS
HEART RATE: 82 BPM | SYSTOLIC BLOOD PRESSURE: 130 MMHG | WEIGHT: 170 LBS | BODY MASS INDEX: 27.32 KG/M2 | DIASTOLIC BLOOD PRESSURE: 70 MMHG | HEIGHT: 66 IN

## 2019-02-05 DIAGNOSIS — S86.812A TENDON RUPTURE, TRAUMATIC, PATELLA, LEFT, INITIAL ENCOUNTER: Primary | ICD-10-CM

## 2019-02-05 DIAGNOSIS — Z96.652 HISTORY OF TOTAL LEFT KNEE REPLACEMENT: ICD-10-CM

## 2019-02-05 DIAGNOSIS — M25.512 ACUTE PAIN OF LEFT SHOULDER DUE TO TRAUMA: Primary | ICD-10-CM

## 2019-02-05 DIAGNOSIS — G89.11 ACUTE PAIN OF LEFT SHOULDER DUE TO TRAUMA: Primary | ICD-10-CM

## 2019-02-05 DIAGNOSIS — Z96.652 STATUS POST TOTAL KNEE REPLACEMENT, LEFT: ICD-10-CM

## 2019-02-05 DIAGNOSIS — S86.812A RUPTURE OF LEFT PATELLAR TENDON, INITIAL ENCOUNTER: ICD-10-CM

## 2019-02-05 PROCEDURE — 1101F PT FALLS ASSESS-DOCD LE1/YR: CPT | Mod: ,,, | Performed by: ORTHOPAEDIC SURGERY

## 2019-02-05 PROCEDURE — 99213 PR OFFICE/OUTPT VISIT, EST, LEVL III, 20-29 MIN: ICD-10-PCS | Mod: 25,,, | Performed by: ORTHOPAEDIC SURGERY

## 2019-02-05 PROCEDURE — 3078F PR MOST RECENT DIASTOLIC BLOOD PRESSURE < 80 MM HG: ICD-10-PCS | Mod: ,,, | Performed by: ORTHOPAEDIC SURGERY

## 2019-02-05 PROCEDURE — 73030 X-RAY EXAM OF SHOULDER: CPT | Mod: LT,,, | Performed by: ORTHOPAEDIC SURGERY

## 2019-02-05 PROCEDURE — 3078F DIAST BP <80 MM HG: CPT | Mod: ,,, | Performed by: ORTHOPAEDIC SURGERY

## 2019-02-05 PROCEDURE — 99213 OFFICE O/P EST LOW 20 MIN: CPT | Mod: 25,,, | Performed by: ORTHOPAEDIC SURGERY

## 2019-02-05 PROCEDURE — 1101F PR PT FALLS ASSESS DOC 0-1 FALLS W/OUT INJ PAST YR: ICD-10-PCS | Mod: ,,, | Performed by: ORTHOPAEDIC SURGERY

## 2019-02-05 PROCEDURE — 3075F SYST BP GE 130 - 139MM HG: CPT | Mod: ,,, | Performed by: ORTHOPAEDIC SURGERY

## 2019-02-05 PROCEDURE — 3075F PR MOST RECENT SYSTOLIC BLOOD PRESS GE 130-139MM HG: ICD-10-PCS | Mod: ,,, | Performed by: ORTHOPAEDIC SURGERY

## 2019-02-05 PROCEDURE — 73030 PR  X-RAY SHOULDER 2+ VW: ICD-10-PCS | Mod: LT,,, | Performed by: ORTHOPAEDIC SURGERY

## 2019-02-05 RX ORDER — IBUPROFEN 200 MG
2 TABLET ORAL
COMMUNITY

## 2019-02-05 RX ORDER — HYDROCODONE BITARTRATE AND ACETAMINOPHEN 5; 325 MG/1; MG/1
TABLET ORAL
Refills: 0 | COMMUNITY
Start: 2019-02-03 | End: 2019-02-26

## 2019-02-05 NOTE — H&P (VIEW-ONLY)
[facility]    Subjective:     Chief Complaint:   Chief Complaint   Patient presents with    Left Knee - Injury     Fracture Left Patella ([facility] ER 2.2.19). He missed the first step when he was stepping out of shed and he fell into the step below. He fell again today at home over a cord  and hurt left knee and left shoulder       Past Medical History:   Diagnosis Date    Coronary artery disease     Heart attack     Hypertension     Thyroid disease     Vertigo        Past Surgical History:   Procedure Laterality Date    ARTHROPLASTY-KNEE Left 11/27/2017    Performed by [facility] at [facility] OR    bilateral hammer toe      CARDIAC SURGERY      bypass    HERNIA REPAIR      left RCR      LUNG REMOVAL, PARTIAL      ROTATOR CUFF REPAIR      left       Current Outpatient Medications   Medication Sig    atenolol (TENORMIN) 25 MG tablet Take 25 mg by mouth every evening.     atorvastatin (LIPITOR) 20 MG tablet Take 20 mg by mouth once daily.    clopidogrel (PLAVIX) 75 mg tablet Take 75 mg by mouth once daily.    cyclobenzaprine (FLEXERIL) 10 MG tablet Take 10 mg by mouth 2 (two) times daily as needed.    furosemide (LASIX) 20 MG tablet Take 20 mg by mouth once daily.    HYDROcodone-acetaminophen (NORCO) 5-325 mg per tablet TK 1 T PO Q 6 H PRN FOR PAIN    ibuprofen (ADVIL,MOTRIN) 200 MG tablet Take 2 tablets by mouth.    levothyroxine (SYNTHROID) 50 MCG tablet Take 50 mcg by mouth once daily.    meclizine (ANTIVERT) 25 mg tablet Take 25 mg by mouth.    omeprazole (PRILOSEC) 20 MG capsule Take 20 mg by mouth once daily.    aspirin 325 MG tablet Take 1 tablet (325 mg total) by mouth 2 (two) times daily.     No current facility-administered medications for this visit.        Review of patient's allergies indicates:   Allergen Reactions    Iodinated contrast- oral and iv dye     Iodine      Other reaction(s): in XRAY CONTRAST --swelling    Iodine and iodide containing products     Pentothal  [thiopental sodium]      Trouble waking up, went into a deep sleep       History reviewed. No pertinent family history.    Social History     Socioeconomic History    Marital status:      Spouse name: Not on file    Number of children: Not on file    Years of education: Not on file    Highest education level: Not on file   Social Needs    Financial resource strain: Not on file    Food insecurity - worry: Not on file    Food insecurity - inability: Not on file    Transportation needs - medical: Not on file    Transportation needs - non-medical: Not on file   Occupational History    Not on file   Tobacco Use    Smoking status: Former Smoker    Smokeless tobacco: Never Used   Substance and Sexual Activity    Alcohol use: Yes     Comment: daily boubon and cola    Drug use: No    Sexual activity: Not on file   Other Topics Concern    Not on file   Social History Narrative    Not on file       History of present illness: Patient comes in today for his left knee. He fell onto his left knee and sustained a patella fracture. He was seen in the ER and splinted and referred on for further care      Review of Systems:    Constitution: Negative for chills, fever, and sweats.  Negative for unexplained weight loss.    HENT:  Negative for headaches and blurry vision.    Cardiovascular:Negative for chest pain or irregular heart beat. Negative for hypertension.    Respiratory:  Negative for cough and shortness of breath.    Gastrointestinal: Negative for abdominal pain, heartburn, melena, nausea, and vomitting.    Genitourinary:  Negative bladder incontinence and dysuria.    Musculoskeletal:  See HPI for details.     Neurological: Negative for numbness.    Psychiatric/Behavioral: Negative for depression.  The patient is not nervous/anxious.      Endocrine: Negative for polyuria    Hematologic/Lymphatic: Negative for bleeding problem.  Does not bruise/bleed easily.    Skin: Negative for poor would healing and  rash    Objective:      Physical Examination:    Vital Signs:    Vitals:    02/05/19 0936   BP: 130/70   Pulse: 82       Body mass index is 27.44 kg/m².    This a well-developed, well nourished patient in no acute distress.  They are alert and oriented and cooperative to examination.        Patient lacks extension of the left knee. He is very ecchymotic around the left knee. His a palpable patella tendon rupture.  Pertinent New Results:    XRAY Report / Interpretation:   AP and lateral of the left knee demonstrates patella also. The patella component appears to be in good position. The femoral and tibial components appear to be in good position.    Assessment/Plan:      Patella tendon rupture following a total knee traumatic. I explained to the patient that this is a very difficult problem to fix. I went over the risks and benefits of surgery. He clearly understood and wished to proceed. He understands that there is a chance of failure and that he may ultimately need more surgery.      This note was created using Dragon voice recognition software that occasionally misinterpreted phrases or words.

## 2019-02-05 NOTE — PROGRESS NOTES
[facility]    Subjective:     Chief Complaint:   Chief Complaint   Patient presents with    Left Knee - Injury     Fracture Left Patella ([facility] ER 2.2.19). He missed the first step when he was stepping out of shed and he fell into the step below. He fell again today at home over a cord  and hurt left knee and left shoulder       Past Medical History:   Diagnosis Date    Coronary artery disease     Heart attack     Hypertension     Thyroid disease     Vertigo        Past Surgical History:   Procedure Laterality Date    ARTHROPLASTY-KNEE Left 11/27/2017    [facility] OR    bilateral hammer toe      CARDIAC SURGERY      bypass    HERNIA REPAIR      left RCR      LUNG REMOVAL, PARTIAL      ROTATOR CUFF REPAIR      left       Current Outpatient Medications   Medication Sig    atenolol (TENORMIN) 25 MG tablet Take 25 mg by mouth every evening.     atorvastatin (LIPITOR) 20 MG tablet Take 20 mg by mouth once daily.    clopidogrel (PLAVIX) 75 mg tablet Take 75 mg by mouth once daily.    cyclobenzaprine (FLEXERIL) 10 MG tablet Take 10 mg by mouth 2 (two) times daily as needed.    furosemide (LASIX) 20 MG tablet Take 20 mg by mouth once daily.    HYDROcodone-acetaminophen (NORCO) 5-325 mg per tablet TK 1 T PO Q 6 H PRN FOR PAIN    ibuprofen (ADVIL,MOTRIN) 200 MG tablet Take 2 tablets by mouth.    levothyroxine (SYNTHROID) 50 MCG tablet Take 50 mcg by mouth once daily.    meclizine (ANTIVERT) 25 mg tablet Take 25 mg by mouth.    omeprazole (PRILOSEC) 20 MG capsule Take 20 mg by mouth once daily.    aspirin 325 MG tablet Take 1 tablet (325 mg total) by mouth 2 (two) times daily.     No current facility-administered medications for this visit.        Review of patient's allergies indicates:   Allergen Reactions    Iodinated contrast- oral and iv dye     Iodine      Other reaction(s): in XRAY CONTRAST --swelling    Iodine and iodide containing products      Pentothal [thiopental sodium]      Trouble waking up, went into a deep sleep       History reviewed. No pertinent family history.    Social History     Socioeconomic History    Marital status:      Spouse name: Not on file    Number of children: Not on file    Years of education: Not on file    Highest education level: Not on file   Social Needs    Financial resource strain: Not on file    Food insecurity - worry: Not on file    Food insecurity - inability: Not on file    Transportation needs - medical: Not on file    Transportation needs - non-medical: Not on file   Occupational History    Not on file   Tobacco Use    Smoking status: Former Smoker    Smokeless tobacco: Never Used   Substance and Sexual Activity    Alcohol use: Yes     Comment: daily boubon and cola    Drug use: No    Sexual activity: Not on file   Other Topics Concern    Not on file   Social History Narrative    Not on file       History of present illness: Patient comes in today for his left knee. He fell onto his left knee and sustained a patella fracture. He was seen in the ER and splinted and referred on for further care      Review of Systems:    Constitution: Negative for chills, fever, and sweats.  Negative for unexplained weight loss.    HENT:  Negative for headaches and blurry vision.    Cardiovascular:Negative for chest pain or irregular heart beat. Negative for hypertension.    Respiratory:  Negative for cough and shortness of breath.    Gastrointestinal: Negative for abdominal pain, heartburn, melena, nausea, and vomitting.    Genitourinary:  Negative bladder incontinence and dysuria.    Musculoskeletal:  See HPI for details.     Neurological: Negative for numbness.    Psychiatric/Behavioral: Negative for depression.  The patient is not nervous/anxious.      Endocrine: Negative for polyuria    Hematologic/Lymphatic: Negative for bleeding problem.  Does not bruise/bleed easily.    Skin: Negative for poor would  healing and rash    Objective:      Physical Examination:    Vital Signs:    Vitals:    02/05/19 0936   BP: 130/70   Pulse: 82       Body mass index is 27.44 kg/m².    This a well-developed, well nourished patient in no acute distress.  They are alert and oriented and cooperative to examination.        Patient lacks extension of the left knee. He is very ecchymotic around the left knee. His a palpable patella tendon rupture.  Pertinent New Results:    XRAY Report / Interpretation:  AP and lateral of the left shoulder demonstrates retained hardware. A type I acromion. Well-preserved glenohumeral articulation.Electronically Signed By Renny Linda MD       Assessment/Plan:      Patella tendon rupture following a total knee traumatic. I explained to the patient that this is a very difficult problem to fix. I went over the risks and benefits of surgery. He clearly understood and wished to proceed. He understands that there is a chance of failure and that he may ultimately need more surgery.      This note was created using Dragon voice recognition software that occasionally misinterpreted phrases or words.

## 2019-02-06 ENCOUNTER — HOSPITAL ENCOUNTER (OUTPATIENT)
Dept: PREADMISSION TESTING | Facility: HOSPITAL | Age: 74
Discharge: HOME | End: 2019-02-06
Attending: ORTHOPAEDIC SURGERY
Payer: MEDICARE

## 2019-02-06 ENCOUNTER — HOSPITAL ENCOUNTER (OUTPATIENT)
Dept: RADIOLOGY | Facility: HOSPITAL | Age: 74
Discharge: HOME | End: 2019-02-06
Attending: ORTHOPAEDIC SURGERY
Payer: MEDICARE

## 2019-02-06 DIAGNOSIS — S86.812A TENDON RUPTURE, TRAUMATIC, PATELLA, LEFT, INITIAL ENCOUNTER: ICD-10-CM

## 2019-02-06 DIAGNOSIS — S86.812A TENDON RUPTURE, TRAUMATIC, PATELLA, LEFT, INITIAL ENCOUNTER: Primary | ICD-10-CM

## 2019-02-06 LAB
ALBUMIN SERPL BCP-MCNC: 3.7 G/DL
ALP SERPL-CCNC: 38 U/L
ALT SERPL W/O P-5'-P-CCNC: 19 U/L
ANION GAP SERPL CALC-SCNC: 8 MMOL/L
AST SERPL-CCNC: 19 U/L
BASOPHILS # BLD AUTO: 0 K/UL
BASOPHILS NFR BLD: 0.3 %
BILIRUB SERPL-MCNC: 0.6 MG/DL
BUN SERPL-MCNC: 21 MG/DL
CALCIUM SERPL-MCNC: 9.5 MG/DL
CHLORIDE SERPL-SCNC: 108 MMOL/L
CO2 SERPL-SCNC: 27 MMOL/L
CREAT SERPL-MCNC: 1.1 MG/DL
DIFFERENTIAL METHOD: ABNORMAL
EOSINOPHIL # BLD AUTO: 0.1 K/UL
EOSINOPHIL NFR BLD: 1 %
ERYTHROCYTE [DISTWIDTH] IN BLOOD BY AUTOMATED COUNT: 13.1 %
EST. GFR  (AFRICAN AMERICAN): >60 ML/MIN/1.73 M^2
EST. GFR  (NON AFRICAN AMERICAN): >60 ML/MIN/1.73 M^2
GLUCOSE SERPL-MCNC: 118 MG/DL
HCT VFR BLD AUTO: 38.3 %
HGB BLD-MCNC: 12.9 G/DL
LYMPHOCYTES # BLD AUTO: 0.8 K/UL
LYMPHOCYTES NFR BLD: 11.2 %
MCH RBC QN AUTO: 31.9 PG
MCHC RBC AUTO-ENTMCNC: 33.6 G/DL
MCV RBC AUTO: 95 FL
MONOCYTES # BLD AUTO: 0.6 K/UL
MONOCYTES NFR BLD: 8.1 %
NEUTROPHILS # BLD AUTO: 5.6 K/UL
NEUTROPHILS NFR BLD: 79.4 %
PLATELET # BLD AUTO: 288 K/UL
PMV BLD AUTO: 7.9 FL
POTASSIUM SERPL-SCNC: 3.9 MMOL/L
PROT SERPL-MCNC: 7 G/DL
RBC # BLD AUTO: 4.04 M/UL
SODIUM SERPL-SCNC: 143 MMOL/L
WBC # BLD AUTO: 7 K/UL

## 2019-02-06 PROCEDURE — 85025 COMPLETE CBC W/AUTO DIFF WBC: CPT

## 2019-02-06 PROCEDURE — 36415 COLL VENOUS BLD VENIPUNCTURE: CPT

## 2019-02-06 PROCEDURE — 80053 COMPREHEN METABOLIC PANEL: CPT

## 2019-02-06 PROCEDURE — 99900104 DSU ONLY-NO CHARGE-EA ADD'L HR (STAT)

## 2019-02-06 PROCEDURE — 71046 XR CHEST PA AND LATERAL: ICD-10-PCS | Mod: 26,,, | Performed by: RADIOLOGY

## 2019-02-06 PROCEDURE — 71046 X-RAY EXAM CHEST 2 VIEWS: CPT | Mod: 26,,, | Performed by: RADIOLOGY

## 2019-02-06 PROCEDURE — 99900103 DSU ONLY-NO CHARGE-INITIAL HR (STAT)

## 2019-02-06 PROCEDURE — 93005 ELECTROCARDIOGRAM TRACING: CPT

## 2019-02-06 PROCEDURE — 93010 ELECTROCARDIOGRAM REPORT: CPT | Mod: ,,, | Performed by: INTERNAL MEDICINE

## 2019-02-06 PROCEDURE — 93010 EKG 12-LEAD: ICD-10-PCS | Mod: ,,, | Performed by: INTERNAL MEDICINE

## 2019-02-06 PROCEDURE — 71046 X-RAY EXAM CHEST 2 VIEWS: CPT | Mod: TC,FY

## 2019-02-06 RX ORDER — MUPIROCIN 20 MG/G
OINTMENT TOPICAL
Status: CANCELLED | OUTPATIENT
Start: 2019-02-06

## 2019-02-06 RX ORDER — SODIUM CHLORIDE 9 MG/ML
INJECTION, SOLUTION INTRAVENOUS CONTINUOUS
Status: CANCELLED | OUTPATIENT
Start: 2019-02-06

## 2019-02-06 RX ORDER — ASPIRIN 81 MG/1
81 TABLET ORAL
Status: ON HOLD | COMMUNITY
End: 2019-02-12 | Stop reason: HOSPADM

## 2019-02-06 NOTE — DISCHARGE INSTRUCTIONS
To confirm, Your doctor has instructed you that surgery is scheduled for:     Please report to [facility], Registration the morning of surgery. You must check-in and receive a wristband before going to your procedure.    Pre-Op will call the afternoon prior to surgery between 1:00 and 6:00 PM with the final arrival time.  Phone number: [phone]    PLEASE NOTE:  The surgery schedule has many variables which may affect the time of your surgery case.  Family members should be available if your surgery time changes.  Plan to be here the day of your procedure between 4-6 hours.    MEDICATIONS:  TAKE ONLY THESE MEDICATIONS WITH A SMALL SIP OF WATER THE MORNING OF YOUR PROCEDURE:  ATENOLOL, OMEPRAZOLE, LEVOTHYROXINE, HYDROCODONE IF NEEDED, MECLIZINE IF NEEDED    DO NOT TAKE THESE MEDICATIONS 5-7 DAYS PRIOR to your procedure or per your surgeon's request: ASPIRIN, ALEVE, ADVIL, IBUPROFEN, FISH OIL VITAMIN E, HERBALS  (May take Tylenol)  PLAVIX, ASPIRIN, IBUPROFEN    ONLY if you are prescribed any types of blood thinners such as:  Aspirin, Coumadin, Plavix, Pradaxa, Xarelto, Aggrenox, Effient, Eliquis, Savasya, Brilinta, or any other, ask your surgeon whether you should stop taking them and how long before surgery you should stop.  You may also need to verify with the prescribing physician if it is ok to stop your medication.      INSTRUCTIONS IMPORTANT!!  · Do not eat or drink anything between midnight and the time of your procedure- this includes gum, mints, and candy.  · Do not smoke or drink alcoholic beverages 24 hours prior to your procedure.  · Shower the night before AND the morning of your procedure with a Chlorhexidine wash such as Hibiclens or Dial antibacterial soap from the neck down.  Do not get it on your face or in your eyes.  You may use your own shampoo and face wash. This helps your skin to be as bacteria free as possible.    · If you wear contact lenses, dentures, hearing aids  or glasses, bring a container to put them in during surgery and give to a family member for safe keeping.  Please leave all jewelry, piercing's and valuables at home.   · DO NOT remove hair from the surgery site.  Do not shave the incision site unless you are given specific instructions to do so.    · ONLY if you have been diagnosed with sleep apnea please bring your C-PAP machine.  · ONLY if you wear home oxygen please bring your portable oxygen tank the day of your procedure.  · ONLY if you have a history of OPEN HEART SURGERY you will need a clearance from your Cardiologist per Anesthesia.      · ONLY for patients requiring bowel prep, written instructions will be given by your doctor's office.  · ONLY if you have a neuro stimulator, please bring the controller with you the morning of surgery  · ONLY if a type and screen test is needed before surgery, please return:  · If your doctor has scheduled you for an overnight stay, bring a small overnight bag with any personal items you need.  · Make arrangements in advance for transportation home by a responsible adult.  It is not safe to drive a vehicle during the 24 hours after anesthesia.      · Visiting hours are 10:00AM to 8:30PM.  For the safety of all patients, visitors under the age of 12 are not allowed above the first floor of the hospital.    · All [facility] facilities and properties are tobacco free.  Smoking is NOT allowed.       If you have any questions about these instructions, call Pre-Op Admit  Nursing at [phone] or the Pre-Op Day Surgery Unit at [phone].

## 2019-02-08 ENCOUNTER — ANESTHESIA EVENT (OUTPATIENT)
Dept: SURGERY | Facility: HOSPITAL | Age: 74
End: 2019-02-08
Payer: MEDICARE

## 2019-02-11 ENCOUNTER — TELEPHONE (OUTPATIENT)
Dept: ORTHOPEDICS | Facility: CLINIC | Age: 74
End: 2019-02-11

## 2019-02-11 ENCOUNTER — ANESTHESIA (OUTPATIENT)
Dept: SURGERY | Facility: HOSPITAL | Age: 74
End: 2019-02-11
Payer: MEDICARE

## 2019-02-11 ENCOUNTER — HOSPITAL ENCOUNTER (OUTPATIENT)
Facility: HOSPITAL | Age: 74
Discharge: HOME | End: 2019-02-12
Attending: ORTHOPAEDIC SURGERY | Admitting: INTERNAL MEDICINE
Payer: MEDICARE

## 2019-02-11 DIAGNOSIS — S86.812A TENDON RUPTURE, TRAUMATIC, PATELLA, LEFT, INITIAL ENCOUNTER: ICD-10-CM

## 2019-02-11 DIAGNOSIS — M17.12 OSTEOARTHRITIS OF LEFT KNEE, UNSPECIFIED OSTEOARTHRITIS TYPE: ICD-10-CM

## 2019-02-11 DIAGNOSIS — Z96.652 S/P TOTAL KNEE ARTHROPLASTY, LEFT: Primary | ICD-10-CM

## 2019-02-11 DIAGNOSIS — I25.10 CORONARY ARTERY DISEASE, ANGINA PRESENCE UNSPECIFIED, UNSPECIFIED VESSEL OR LESION TYPE, UNSPECIFIED WHETHER NATIVE OR TRANSPLANTED HEART: ICD-10-CM

## 2019-02-11 PROCEDURE — S5010 5% DEXTROSE AND 0.45% SALINE: HCPCS | Performed by: ORTHOPAEDIC SURGERY

## 2019-02-11 PROCEDURE — A4216 STERILE WATER/SALINE, 10 ML: HCPCS | Performed by: ORTHOPAEDIC SURGERY

## 2019-02-11 PROCEDURE — 63600175 PHARM REV CODE 636 W HCPCS: Performed by: NURSE ANESTHETIST, CERTIFIED REGISTERED

## 2019-02-11 PROCEDURE — D9220A PRA ANESTHESIA: ICD-10-PCS | Mod: CRNA,,, | Performed by: NURSE ANESTHETIST, CERTIFIED REGISTERED

## 2019-02-11 PROCEDURE — 25000003 PHARM REV CODE 250: Performed by: NURSE ANESTHETIST, CERTIFIED REGISTERED

## 2019-02-11 PROCEDURE — 25000003 PHARM REV CODE 250: Performed by: ANESTHESIOLOGY

## 2019-02-11 PROCEDURE — 76942 ECHO GUIDE FOR BIOPSY: CPT | Mod: 26,,, | Performed by: ANESTHESIOLOGY

## 2019-02-11 PROCEDURE — D9220A PRA ANESTHESIA: Mod: ANES,,, | Performed by: ANESTHESIOLOGY

## 2019-02-11 PROCEDURE — 71000033 HC RECOVERY, INTIAL HOUR: Performed by: ORTHOPAEDIC SURGERY

## 2019-02-11 PROCEDURE — 64447 PR NERVE BLOCK INJ, ANES/STEROID, FEMORAL, INCL IMAG GUIDANCE: ICD-10-PCS | Mod: 59,LT,, | Performed by: ANESTHESIOLOGY

## 2019-02-11 PROCEDURE — 64447 NJX AA&/STRD FEMORAL NRV IMG: CPT | Mod: 59,LT,, | Performed by: ANESTHESIOLOGY

## 2019-02-11 PROCEDURE — 25000003 PHARM REV CODE 250: Performed by: ORTHOPAEDIC SURGERY

## 2019-02-11 PROCEDURE — 63600175 PHARM REV CODE 636 W HCPCS: Performed by: ANESTHESIOLOGY

## 2019-02-11 PROCEDURE — 71000039 HC RECOVERY, EACH ADD'L HOUR: Performed by: ORTHOPAEDIC SURGERY

## 2019-02-11 PROCEDURE — 25000003 PHARM REV CODE 250: Performed by: INTERNAL MEDICINE

## 2019-02-11 PROCEDURE — 99900103 DSU ONLY-NO CHARGE-INITIAL HR (STAT): Performed by: ORTHOPAEDIC SURGERY

## 2019-02-11 PROCEDURE — 36000709 HC OR TIME LEV III EA ADD 15 MIN: Performed by: ORTHOPAEDIC SURGERY

## 2019-02-11 PROCEDURE — 94761 N-INVAS EAR/PLS OXIMETRY MLT: CPT | Mod: 59

## 2019-02-11 PROCEDURE — 27200750 HC INSULATED NEEDLE/ STIMUPLEX: Performed by: ANESTHESIOLOGY

## 2019-02-11 PROCEDURE — 99900104 DSU ONLY-NO CHARGE-EA ADD'L HR (STAT): Performed by: ORTHOPAEDIC SURGERY

## 2019-02-11 PROCEDURE — 36000708 HC OR TIME LEV III 1ST 15 MIN: Performed by: ORTHOPAEDIC SURGERY

## 2019-02-11 PROCEDURE — 76942 PR U/S GUIDANCE FOR NEEDLE GUIDANCE: ICD-10-PCS | Mod: 26,,, | Performed by: ANESTHESIOLOGY

## 2019-02-11 PROCEDURE — 01320 ANES ALL PX NRV MSC KNE&/POP: CPT | Performed by: ORTHOPAEDIC SURGERY

## 2019-02-11 PROCEDURE — D9220A PRA ANESTHESIA: ICD-10-PCS | Mod: ANES,,, | Performed by: ANESTHESIOLOGY

## 2019-02-11 PROCEDURE — 63600175 PHARM REV CODE 636 W HCPCS: Performed by: ORTHOPAEDIC SURGERY

## 2019-02-11 PROCEDURE — 37000009 HC ANESTHESIA EA ADD 15 MINS: Performed by: ORTHOPAEDIC SURGERY

## 2019-02-11 PROCEDURE — C9290 INJ, BUPIVACAINE LIPOSOME: HCPCS | Performed by: ANESTHESIOLOGY

## 2019-02-11 PROCEDURE — 37000008 HC ANESTHESIA 1ST 15 MINUTES: Performed by: ORTHOPAEDIC SURGERY

## 2019-02-11 PROCEDURE — 64447 NJX AA&/STRD FEMORAL NRV IMG: CPT | Performed by: ANESTHESIOLOGY

## 2019-02-11 PROCEDURE — D9220A PRA ANESTHESIA: Mod: CRNA,,, | Performed by: NURSE ANESTHETIST, CERTIFIED REGISTERED

## 2019-02-11 PROCEDURE — S0020 INJECTION, BUPIVICAINE HYDRO: HCPCS | Performed by: ANESTHESIOLOGY

## 2019-02-11 PROCEDURE — 27201423 OPTIME MED/SURG SUP & DEVICES STERILE SUPPLY: Performed by: ORTHOPAEDIC SURGERY

## 2019-02-11 RX ORDER — ASPIRIN 325 MG
325 TABLET ORAL 2 TIMES DAILY
Status: DISCONTINUED | OUTPATIENT
Start: 2019-02-11 | End: 2019-02-12 | Stop reason: HOSPADM

## 2019-02-11 RX ORDER — SODIUM CHLORIDE, SODIUM LACTATE, POTASSIUM CHLORIDE, CALCIUM CHLORIDE 600; 310; 30; 20 MG/100ML; MG/100ML; MG/100ML; MG/100ML
10 INJECTION, SOLUTION INTRAVENOUS CONTINUOUS
Status: DISCONTINUED | OUTPATIENT
Start: 2019-02-12 | End: 2019-02-11

## 2019-02-11 RX ORDER — ZOLPIDEM TARTRATE 5 MG/1
5 TABLET ORAL NIGHTLY PRN
Status: DISCONTINUED | OUTPATIENT
Start: 2019-02-11 | End: 2019-02-12 | Stop reason: HOSPADM

## 2019-02-11 RX ORDER — ATORVASTATIN CALCIUM 20 MG/1
20 TABLET, FILM COATED ORAL DAILY
Status: DISCONTINUED | OUTPATIENT
Start: 2019-02-11 | End: 2019-02-12 | Stop reason: HOSPADM

## 2019-02-11 RX ORDER — SODIUM CHLORIDE 9 MG/ML
INJECTION, SOLUTION INTRAVENOUS CONTINUOUS
Status: DISCONTINUED | OUTPATIENT
Start: 2019-02-11 | End: 2019-02-11

## 2019-02-11 RX ORDER — CEFAZOLIN SODIUM 1 G/50ML
1 SOLUTION INTRAVENOUS
Status: DISCONTINUED | OUTPATIENT
Start: 2019-02-11 | End: 2019-02-12 | Stop reason: HOSPADM

## 2019-02-11 RX ORDER — BUPIVACAINE HYDROCHLORIDE 5 MG/ML
INJECTION, SOLUTION EPIDURAL; INTRACAUDAL
Status: COMPLETED | OUTPATIENT
Start: 2019-02-11 | End: 2019-02-11

## 2019-02-11 RX ORDER — PANTOPRAZOLE SODIUM 40 MG/1
40 TABLET, DELAYED RELEASE ORAL DAILY
Status: DISCONTINUED | OUTPATIENT
Start: 2019-02-11 | End: 2019-02-12 | Stop reason: HOSPADM

## 2019-02-11 RX ORDER — DEXTROSE MONOHYDRATE AND SODIUM CHLORIDE 5; .45 G/100ML; G/100ML
INJECTION, SOLUTION INTRAVENOUS CONTINUOUS
Status: DISCONTINUED | OUTPATIENT
Start: 2019-02-11 | End: 2019-02-12 | Stop reason: HOSPADM

## 2019-02-11 RX ORDER — SODIUM CHLORIDE, SODIUM LACTATE, POTASSIUM CHLORIDE, CALCIUM CHLORIDE 600; 310; 30; 20 MG/100ML; MG/100ML; MG/100ML; MG/100ML
10 INJECTION, SOLUTION INTRAVENOUS CONTINUOUS
Status: DISCONTINUED | OUTPATIENT
Start: 2019-02-11 | End: 2019-02-12 | Stop reason: HOSPADM

## 2019-02-11 RX ORDER — MIDAZOLAM HYDROCHLORIDE 1 MG/ML
INJECTION, SOLUTION INTRAMUSCULAR; INTRAVENOUS
Status: DISCONTINUED | OUTPATIENT
Start: 2019-02-11 | End: 2019-02-11

## 2019-02-11 RX ORDER — OXYCODONE HYDROCHLORIDE 5 MG/1
5 TABLET ORAL
Status: DISCONTINUED | OUTPATIENT
Start: 2019-02-11 | End: 2019-02-11 | Stop reason: HOSPADM

## 2019-02-11 RX ORDER — FUROSEMIDE 20 MG/1
20 TABLET ORAL DAILY
Status: DISCONTINUED | OUTPATIENT
Start: 2019-02-11 | End: 2019-02-12

## 2019-02-11 RX ORDER — PROPOFOL 10 MG/ML
VIAL (ML) INTRAVENOUS
Status: DISCONTINUED | OUTPATIENT
Start: 2019-02-11 | End: 2019-02-11

## 2019-02-11 RX ORDER — LEVOTHYROXINE SODIUM 50 UG/1
50 TABLET ORAL
Status: DISCONTINUED | OUTPATIENT
Start: 2019-02-11 | End: 2019-02-12 | Stop reason: HOSPADM

## 2019-02-11 RX ORDER — GLYCOPYRROLATE 0.2 MG/ML
INJECTION INTRAMUSCULAR; INTRAVENOUS
Status: DISCONTINUED | OUTPATIENT
Start: 2019-02-11 | End: 2019-02-11

## 2019-02-11 RX ORDER — POLYETHYLENE GLYCOL 3350 17 G/17G
17 POWDER, FOR SOLUTION ORAL DAILY
Status: DISCONTINUED | OUTPATIENT
Start: 2019-02-11 | End: 2019-02-12 | Stop reason: HOSPADM

## 2019-02-11 RX ORDER — MORPHINE SULFATE 4 MG/ML
2 INJECTION, SOLUTION INTRAMUSCULAR; INTRAVENOUS EVERY 4 HOURS PRN
Status: DISCONTINUED | OUTPATIENT
Start: 2019-02-11 | End: 2019-02-12 | Stop reason: HOSPADM

## 2019-02-11 RX ORDER — CEFAZOLIN SODIUM 2 G/50ML
2 SOLUTION INTRAVENOUS
Status: COMPLETED | OUTPATIENT
Start: 2019-02-11 | End: 2019-02-11

## 2019-02-11 RX ORDER — OXYCODONE HYDROCHLORIDE 5 MG/1
5 TABLET ORAL
Status: DISCONTINUED | OUTPATIENT
Start: 2019-02-11 | End: 2019-02-12 | Stop reason: HOSPADM

## 2019-02-11 RX ORDER — DEXAMETHASONE SODIUM PHOSPHATE 4 MG/ML
INJECTION, SOLUTION INTRA-ARTICULAR; INTRALESIONAL; INTRAMUSCULAR; INTRAVENOUS; SOFT TISSUE
Status: DISCONTINUED | OUTPATIENT
Start: 2019-02-11 | End: 2019-02-11

## 2019-02-11 RX ORDER — FAMOTIDINE 20 MG/1
20 TABLET, FILM COATED ORAL 2 TIMES DAILY
Status: DISCONTINUED | OUTPATIENT
Start: 2019-02-11 | End: 2019-02-12 | Stop reason: HOSPADM

## 2019-02-11 RX ORDER — ACETAMINOPHEN 10 MG/ML
INJECTION, SOLUTION INTRAVENOUS
Status: DISCONTINUED | OUTPATIENT
Start: 2019-02-11 | End: 2019-02-11

## 2019-02-11 RX ORDER — FENTANYL CITRATE 50 UG/ML
INJECTION, SOLUTION INTRAMUSCULAR; INTRAVENOUS
Status: DISCONTINUED | OUTPATIENT
Start: 2019-02-11 | End: 2019-02-11

## 2019-02-11 RX ORDER — ONDANSETRON 8 MG/1
8 TABLET, ORALLY DISINTEGRATING ORAL EVERY 8 HOURS PRN
Status: DISCONTINUED | OUTPATIENT
Start: 2019-02-11 | End: 2019-02-12 | Stop reason: HOSPADM

## 2019-02-11 RX ORDER — LIDOCAINE HCL/PF 100 MG/5ML
SYRINGE (ML) INTRAVENOUS
Status: DISCONTINUED | OUTPATIENT
Start: 2019-02-11 | End: 2019-02-11

## 2019-02-11 RX ORDER — SODIUM CHLORIDE 0.9 % (FLUSH) 0.9 %
3 SYRINGE (ML) INJECTION EVERY 8 HOURS
Status: DISCONTINUED | OUTPATIENT
Start: 2019-02-11 | End: 2019-02-11 | Stop reason: HOSPADM

## 2019-02-11 RX ORDER — CLOPIDOGREL BISULFATE 75 MG/1
75 TABLET ORAL DAILY
Status: DISCONTINUED | OUTPATIENT
Start: 2019-02-11 | End: 2019-02-12 | Stop reason: HOSPADM

## 2019-02-11 RX ORDER — ATENOLOL 25 MG/1
25 TABLET ORAL NIGHTLY
Status: DISCONTINUED | OUTPATIENT
Start: 2019-02-11 | End: 2019-02-12

## 2019-02-11 RX ORDER — HYDROMORPHONE HYDROCHLORIDE 2 MG/ML
0.2 INJECTION, SOLUTION INTRAMUSCULAR; INTRAVENOUS; SUBCUTANEOUS EVERY 5 MIN PRN
Status: DISCONTINUED | OUTPATIENT
Start: 2019-02-11 | End: 2019-02-11 | Stop reason: HOSPADM

## 2019-02-11 RX ORDER — SODIUM CHLORIDE 0.9 % (FLUSH) 0.9 %
5 SYRINGE (ML) INJECTION
Status: DISCONTINUED | OUTPATIENT
Start: 2019-02-11 | End: 2019-02-12 | Stop reason: HOSPADM

## 2019-02-11 RX ORDER — LIDOCAINE HYDROCHLORIDE 10 MG/ML
1 INJECTION, SOLUTION EPIDURAL; INFILTRATION; INTRACAUDAL; PERINEURAL ONCE
Status: DISCONTINUED | OUTPATIENT
Start: 2019-02-11 | End: 2019-02-11 | Stop reason: HOSPADM

## 2019-02-11 RX ORDER — BISACODYL 10 MG
10 SUPPOSITORY, RECTAL RECTAL DAILY
Status: DISCONTINUED | OUTPATIENT
Start: 2019-02-11 | End: 2019-02-12 | Stop reason: HOSPADM

## 2019-02-11 RX ORDER — MUPIROCIN 20 MG/G
OINTMENT TOPICAL
Status: DISCONTINUED | OUTPATIENT
Start: 2019-02-11 | End: 2019-02-11 | Stop reason: HOSPADM

## 2019-02-11 RX ORDER — ONDANSETRON 2 MG/ML
INJECTION INTRAMUSCULAR; INTRAVENOUS
Status: DISCONTINUED | OUTPATIENT
Start: 2019-02-11 | End: 2019-02-11

## 2019-02-11 RX ORDER — FENTANYL CITRATE 50 UG/ML
25 INJECTION, SOLUTION INTRAMUSCULAR; INTRAVENOUS EVERY 5 MIN PRN
Status: COMPLETED | OUTPATIENT
Start: 2019-02-11 | End: 2019-02-11

## 2019-02-11 RX ADMIN — ONDANSETRON 4 MG: 2 INJECTION, SOLUTION INTRAMUSCULAR; INTRAVENOUS at 11:02

## 2019-02-11 RX ADMIN — ASPIRIN 325 MG ORAL TABLET 325 MG: 325 PILL ORAL at 09:02

## 2019-02-11 RX ADMIN — ATENOLOL 25 MG: 25 TABLET ORAL at 09:02

## 2019-02-11 RX ADMIN — FAMOTIDINE 20 MG: 20 TABLET, FILM COATED ORAL at 09:02

## 2019-02-11 RX ADMIN — CEFAZOLIN SODIUM 2 G: 2 SOLUTION INTRAVENOUS at 11:02

## 2019-02-11 RX ADMIN — ATORVASTATIN CALCIUM 20 MG: 20 TABLET, FILM COATED ORAL at 06:02

## 2019-02-11 RX ADMIN — SODIUM CHLORIDE, SODIUM LACTATE, POTASSIUM CHLORIDE, AND CALCIUM CHLORIDE 10 ML/HR: .6; .31; .03; .02 INJECTION, SOLUTION INTRAVENOUS at 09:02

## 2019-02-11 RX ADMIN — FENTANYL CITRATE 25 MCG: 50 INJECTION INTRAMUSCULAR; INTRAVENOUS at 01:02

## 2019-02-11 RX ADMIN — CLOPIDOGREL BISULFATE 75 MG: 75 TABLET ORAL at 06:02

## 2019-02-11 RX ADMIN — DEXAMETHASONE SODIUM PHOSPHATE 8 MG: 4 INJECTION, SOLUTION INTRAMUSCULAR; INTRAVENOUS at 11:02

## 2019-02-11 RX ADMIN — FENTANYL CITRATE 50 MCG: 50 INJECTION, SOLUTION INTRAMUSCULAR; INTRAVENOUS at 09:02

## 2019-02-11 RX ADMIN — SODIUM CHLORIDE, SODIUM LACTATE, POTASSIUM CHLORIDE, AND CALCIUM CHLORIDE: .6; .31; .03; .02 INJECTION, SOLUTION INTRAVENOUS at 12:02

## 2019-02-11 RX ADMIN — FENTANYL CITRATE 50 MCG: 50 INJECTION, SOLUTION INTRAMUSCULAR; INTRAVENOUS at 11:02

## 2019-02-11 RX ADMIN — POLYETHYLENE GLYCOL 3350 17 G: 17 POWDER, FOR SOLUTION ORAL at 06:02

## 2019-02-11 RX ADMIN — BUPIVACAINE HYDROCHLORIDE 10 ML: 5 INJECTION, SOLUTION EPIDURAL; INTRACAUDAL; PERINEURAL at 09:02

## 2019-02-11 RX ADMIN — LIDOCAINE HYDROCHLORIDE 100 MG: 20 INJECTION, SOLUTION INTRAVENOUS at 11:02

## 2019-02-11 RX ADMIN — MIDAZOLAM 2 MG: 1 INJECTION INTRAMUSCULAR; INTRAVENOUS at 09:02

## 2019-02-11 RX ADMIN — GLYCOPYRROLATE 0.2 MG: 0.2 INJECTION, SOLUTION INTRAMUSCULAR; INTRAVENOUS at 11:02

## 2019-02-11 RX ADMIN — CEFAZOLIN SODIUM 1 G: 1 SOLUTION INTRAVENOUS at 09:02

## 2019-02-11 RX ADMIN — DEXTROSE AND SODIUM CHLORIDE: 5; .45 INJECTION, SOLUTION INTRAVENOUS at 05:02

## 2019-02-11 RX ADMIN — PANTOPRAZOLE SODIUM 40 MG: 40 TABLET, DELAYED RELEASE ORAL at 06:02

## 2019-02-11 RX ADMIN — MUPIROCIN: 20 OINTMENT TOPICAL at 08:02

## 2019-02-11 RX ADMIN — BUPIVACAINE 20 ML: 13.3 INJECTION, SUSPENSION, LIPOSOMAL INFILTRATION at 09:02

## 2019-02-11 RX ADMIN — Medication 5 ML: at 03:02

## 2019-02-11 RX ADMIN — ACETAMINOPHEN 1000 MG: 10 INJECTION, SOLUTION INTRAVENOUS at 11:02

## 2019-02-11 RX ADMIN — PROPOFOL 160 MG: 10 INJECTION, EMULSION INTRAVENOUS at 11:02

## 2019-02-11 RX ADMIN — OXYCODONE HYDROCHLORIDE 5 MG: 5 TABLET ORAL at 01:02

## 2019-02-11 RX ADMIN — FUROSEMIDE 20 MG: 20 TABLET ORAL at 06:02

## 2019-02-11 NOTE — INTERVAL H&P NOTE
The patient has been examined and the H&P has been reviewed:    I concur with the findings and no changes have occurred since H&P was written.    Anesthesia/Surgery risks, benefits and alternative options discussed and understood by patient/family.          Active Hospital Problems    Diagnosis  POA    Tendon rupture, traumatic, patella, left, initial encounter [S83.970K]  Yes      Resolved Hospital Problems   No resolved problems to display.

## 2019-02-11 NOTE — PROGRESS NOTES
Was informed per Elisa Villatoro, RN charge nurse that Venita Lloyd RN stated the patient has orders to admit to the hospital and she is in the process of contacting Dr. Avendaño now.  Spoke with Gabriela at [facility] informing of above.  She states she will still follow up with the patient's spouse to determine what the spouse would like to do upon discharge.

## 2019-02-11 NOTE — ANESTHESIA PREPROCEDURE EVALUATION
02/11/2019  Mor Hill is a 73 y.o., male.    Pre-op Assessment    I have reviewed the Patient Summary Reports.     I have reviewed the Nursing Notes.   I have reviewed the Medications.     Review of Systems  Anesthesia Hx:  No problems with previous Anesthesia    Social:  Former Smoker    Hematology/Oncology:  Hematology Normal   Oncology Normal     EENT/Dental:EENT/Dental Normal   Cardiovascular:   Hypertension Past MI CAD  CABG/stent     Pulmonary:   Partial lobectomy    Renal/:  Renal/ Normal     Hepatic/GI:  Hepatic/GI Normal    Musculoskeletal:   Arthritis     Neurological:  Neurology Normal    Endocrine:  Endocrine Normal    Dermatological:  Skin Normal    Psych:  Psychiatric Normal           Physical Exam  General:  Well nourished    Airway/Jaw/Neck:  Airway Findings: Mouth Opening: Normal Tongue: Normal  General Airway Assessment: Adult  Mallampati: I  TM Distance: Normal, at least 6 cm        Eyes/Ears/Nose:  EYES/EARS/NOSE FINDINGS: Normal   Dental:  DENTAL FINDINGS: Normal   Chest/Lungs:  Chest/Lungs Findings: Clear to auscultation, Normal Respiratory Rate     Heart/Vascular:  Heart Findings: Rate: Normal  Rhythm: Regular Rhythm  Sounds: Normal     Abdomen:  Abdomen Findings: Normal    Musculoskeletal:  Musculoskeletal Findings: Normal   Skin:  Skin Findings: Normal    Mental Status:  Mental Status Findings: Normal        Anesthesia Plan  Type of Anesthesia, risks & benefits discussed:  Anesthesia Type:  general  Patient's Preference: General  Intra-op Monitoring Plan: standard ASA monitors  Intra-op Monitoring Plan Comments:   Post Op Pain Control Plan: multimodal analgesia, peripheral nerve block and IV/PO Opioids PRN  Post Op Pain Control Plan Comments:   Induction:   IV  Beta Blocker:  Patient is on a Beta-Blocker and has received one dose within the past 24 hours (No further  documentation required).       Informed Consent: Patient understands risks and agrees with Anesthesia plan.  Questions answered. Anesthesia consent signed with patient.  ASA Score: 3     Day of Surgery Review of History & Physical: I have interviewed and examined the patient. I have reviewed the patient's H&P dated:  There are no significant changes.  H&P update referred to the surgeon.         Ready For Surgery From Anesthesia Perspective.

## 2019-02-11 NOTE — PROGRESS NOTES
Voicemail left for patient's spouse at [phone] to discuss his discharge plan since Walter is unaware that he was going to be admitted there today.

## 2019-02-11 NOTE — H&P
PCP: Ayden Carter MD    History & Physical    Chief Complaint: s/p Left patellar tendon repair    History of Present Illness:  Patient is a 73 y.o. male admitted to Hospitalist Service from Operation Room s/p left patellar tendon repair performed by Dr. Linda. Patient reportedly has past medical history significant for hypertension, hyperlipidemia, CAD and hypothyroidism. Patient has left TKA performed on 11/27/17 by Dr. Linda. Recently patient suffered a mechanical fall resulting in left patellar fracture. Post-operatively, patient denied chest pain, shortness of breath, abdominal pain, nausea, vomiting, headache, vision changes, focal neuro-deficits, cough or fever.    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 No pertinent family history.  Social History     Tobacco Use    Smoking status: Former Smoker    Smokeless tobacco: Never Used   Substance Use Topics    Alcohol use: Yes     Comment: daily boubon and cola    Drug use: No      Review of patient's allergies indicates:   Allergen Reactions    Iodinated contrast- oral and iv dye     Iodine      Other reaction(s): in XRAY CONTRAST --swelling    Iodine and iodide containing products     Pentothal [thiopental sodium]      Trouble waking up, went into a deep  sleep     PTA Medications   Medication Sig    cyclobenzaprine (FLEXERIL) 10 MG tablet Take 10 mg by mouth 2 (two) times daily as needed.    furosemide (LASIX) 20 MG tablet Take 20 mg by mouth once daily.    HYDROcodone-acetaminophen (NORCO) 5-325 mg per tablet TK 1 T PO Q 6 H PRN FOR PAIN    levothyroxine (SYNTHROID) 50 MCG tablet Take 50 mcg by mouth once daily.    meclizine (ANTIVERT) 25 mg tablet Take 25 mg by mouth as needed.     aspirin (ECOTRIN) 81 MG EC tablet Take 81 mg by mouth as needed for Pain.    atenolol (TENORMIN) 25 MG tablet Take 25 mg by mouth every evening.     atorvastatin (LIPITOR) 20 MG tablet Take 20 mg by mouth once daily.    clopidogrel (PLAVIX) 75 mg tablet Take 75 mg by mouth once daily.    ibuprofen (ADVIL,MOTRIN) 200 MG tablet Take 2 tablets by mouth.    omeprazole (PRILOSEC) 20 MG capsule Take 20 mg by mouth once daily.     Review of Systems:  Constitutional: no fever or chills  Eyes: no visual changes  Ears, nose, mouth, throat, and face: no nasal congestion or sore throat + very [place]  Respiratory: no cough or shorness of breath  Cardiovascular: no chest pain or palpitations  Gastrointestinal: no nausea or vomiting, no abdominal pain or change in bowel habits  Genitourinary: no hematuria or dysuria  Integument/breast: no rash or pruritis  Hematologic/lymphatic: no easy bruising or lymphadenopathy  Musculoskeletal: see HPI  Neurological: no seizures or tremors.  Behavioral/Psych: no auditory or visual hallucinations  Endocrine: no heat or cold intolerance     OBJECTIVE:     Vital Signs (Most Recent)  Temp: 98.2 °F (36.8 °C) (02/11/19 1400)  Pulse: 88 (02/11/19 1400)  Resp: 18 (02/11/19 1400)  BP: 124/60 (02/11/19 1400)  SpO2: (!) 94 % (02/11/19 1400)    Physical Exam:  General appearance: well developed, appears stated age, patient is very [place]  Head: normocephalic, atraumatic  Eyes:  conjunctivae/corneas clear. PERRL.  Nose: Nares normal. Septum midline.  Throat: lips, mucosa,  and tongue normal; teeth and gums normal, no throat erythema.  Neck: supple, symmetrical, trachea midline, no JVD and thyroid not enlarged, symmetric, no tenderness/mass/nodules  Lungs:  clear to auscultation bilaterally and normal respiratory effort  Chest wall: no tenderness  Heart: regular rate and rhythm, S1, S2 normal, no murmur, click, rub or gallop  Abdomen: soft, non-tender non-distented; bowel sounds normal; no masses,  no organomegaly  Extremities: no cyanosis, clubbing or edema. Left knee dressing C/D/I, a knee immobilizer is in place  Pulses: 2+ and symmetric  Skin: Skin color, texture, turgor normal. No rashes or lesions.  Lymph nodes: Cervical, supraclavicular, and axillary nodes normal.  Neurologic: Normal strength and tone. No focal numbness or weakness. CNII-XII intact.    Laboratory:   CBC:   Recent Labs   Lab 02/06/19  1550   WBC 7.00   RBC 4.04*   HGB 12.9*   HCT 38.3*      MCV 95   MCH 31.9*   MCHC 33.6     CMP:   Recent Labs   Lab 02/06/19  1550   *   CALCIUM 9.5   ALBUMIN 3.7   PROT 7.0      K 3.9   CO2 27      BUN 21   CREATININE 1.1   ALKPHOS 38*   ALT 19   AST 19   BILITOT 0.6     Diagnostic Results: None    Assessment/Plan:        *s/p Left patellar tendon repair  S/P total knee arthroplasty, left []   Not Applicable    Osteoarthritis of left knee [M17.12]  Continue to follow Orthopedic recommendations.  Needs aggressive incentive spirometry.  Follow hemoglobin and hematocrit closely.  Pain control with IV narcotics and antiemetics as needed.  Physical therapy as per Orthopedics protocol with fall precautions.      Yes    Hypertension [I10]  Chronic problem. Will continue chronic medications and monitor for any changes, adjusting as needed.      Yes    Coronary artery disease [I25.10]  Patient with known CAD and monitor for S/Sx of angina/ACS. Continue to monitor on telemetry.      Yes    Thyroid disease [E07.9]  Chronic problem. Will continue chronic  medications and monitor for any changes, adjusting as needed.      Yes       DVT prophylaxis: Aspirin 325 mg BID as per Dr. Linda.     Andrea Avendaño MD  Department of Hospital Medicine   [facility]

## 2019-02-11 NOTE — TELEPHONE ENCOUNTER
[employer] was calling because patient's wife was calling them about something being ordered but they have nothing on record of us ordering this test. Call the insurance back with case # [id number].

## 2019-02-11 NOTE — PROGRESS NOTES
Spoke with Baljit at [facility] at  to verify patient will be going there upon discharge and that their bus/van will be transporting him there today as he stated during preop.  Was transferred and spoke with Karla in Admissions at [facility] stating they are unaware of this patient being admitted today.  States she will talk to the patient's wife, who the patient stated arranged it, to see who she has been working with at [facility] to coordinate this.  Stated the patient has to have 3 day overnight stay to qualify for skilled nursing care. Elisa Villatoro, MARGARITA charge nurse informed.

## 2019-02-11 NOTE — ANESTHESIA POSTPROCEDURE EVALUATION
"Anesthesia Post Evaluation    Patient: Mor Hill    Procedure(s) Performed: Procedure(s) (LRB):  REPAIR, TENDON, PATELLAR (Left)    Final Anesthesia Type: general  Patient location during evaluation: PACU  Patient participation: Yes- Able to Participate  Level of consciousness: awake and alert, oriented and awake  Post-procedure vital signs: reviewed and stable  Pain management: adequate  Airway patency: patent  PONV status at discharge: No PONV  Anesthetic complications: no      Cardiovascular status: blood pressure returned to baseline and hemodynamically stable  Respiratory status: unassisted, spontaneous ventilation and room air  Hydration status: euvolemic  Follow-up not needed.        Visit Vitals  /63   Pulse 86   Temp 36.5 °C (97.7 °F) (Skin)   Resp 18   Ht 5' 6" (1.676 m)   Wt 77.1 kg (170 lb)   SpO2 97%   BMI 27.44 kg/m²       Pain/Yvonne Score: Pain Rating Prior to Med Admin: 6 (2/11/2019  1:43 PM)  Yvonne Score: 8 (2/11/2019  1:40 PM)        "

## 2019-02-11 NOTE — BRIEF OP NOTE
[facility]  Brief Operative Note     SUMMARY     Surgery Date: 2/11/2019     Surgeon(s) and Role:     * Renny Linda MD - Primary    Assisting Surgeon: None    Pre-op Diagnosis:  Tendon rupture, traumatic, patella, left, initial encounter [S86.812A]  Status post total knee replacement, left [Z96.652]    Post-op Diagnosis:  Post-Op Diagnosis Codes:     * Tendon rupture, traumatic, patella, left, initial encounter [S86.812A]     * Status post total knee replacement, left [Z96.652]    Procedure(s) (LRB):  REPAIR, TENDON, PATELLAR (Left)    Anesthesia: General    Description of the findings of the procedure: l patella tendon repair    Findings/Key Components: dictated [id number]    Estimated Blood Loss: 0         Specimens:   Specimen (12h ago, onward)    None          Discharge Note    SUMMARY     Admit Date: 2/11/2019    Discharge Date and Time:  02/11/2019 12:40 PM    Hospital Course (synopsis of major diagnoses, care, treatment, and services provided during the course of the hospital stay): Uneventful      Final Diagnosis: Post-Op Diagnosis Codes:     * Tendon rupture, traumatic, patella, left, initial encounter [S86.812A]     * Status post total knee replacement, left [Z96.652]    Disposition: Home or Self Care    Follow Up/Patient Instructions:     Medications:  Reconciled Home Medications:   Current Discharge Medication List      CONTINUE these medications which have NOT CHANGED    Details   cyclobenzaprine (FLEXERIL) 10 MG tablet Take 10 mg by mouth 2 (two) times daily as needed.      furosemide (LASIX) 20 MG tablet Take 20 mg by mouth once daily.      HYDROcodone-acetaminophen (NORCO) 5-325 mg per tablet TK 1 T PO Q 6 H PRN FOR PAIN  Refills: 0      levothyroxine (SYNTHROID) 50 MCG tablet Take 50 mcg by mouth once daily.      meclizine (ANTIVERT) 25 mg tablet Take 25 mg by mouth as needed.       aspirin (ECOTRIN) 81 MG EC tablet Take 81 mg by mouth as needed for Pain.      atenolol (TENORMIN)  25 MG tablet Take 25 mg by mouth every evening.       atorvastatin (LIPITOR) 20 MG tablet Take 20 mg by mouth once daily.      clopidogrel (PLAVIX) 75 mg tablet Take 75 mg by mouth once daily.      ibuprofen (ADVIL,MOTRIN) 200 MG tablet Take 2 tablets by mouth.      omeprazole (PRILOSEC) 20 MG capsule Take 20 mg by mouth once daily.           Discharge Procedure Orders   Diet general     Call MD for:  temperature >100.4     Call MD for:  persistent nausea and vomiting     Call MD for:  severe uncontrolled pain     Call MD for:  difficulty breathing, headache or visual disturbances     Call MD for:  redness, tenderness, or signs of infection (pain, swelling, redness, odor or green/yellow discharge around incision site)     Call MD for:  hives     Call MD for:  persistent dizziness or light-headedness     Call MD for:  extreme fatigue     Sponge bath only until clinic visit     Follow-up Information     Renny Linda MD In 1 week.    Specialties:  Orthopedic Surgery, Surgery, Sports Medicine  Contact information:  [address]  [address] [id number] [phone]

## 2019-02-11 NOTE — PLAN OF CARE
02/11/19 1650   PRE-TX-O2   O2 Device (Oxygen Therapy) room air   Pulse Oximetry Type Intermittent   $ Pulse Oximetry - Multiple Charge Pulse Oximetry - Multiple

## 2019-02-11 NOTE — PROGRESS NOTES
Time out completed per Dr. Marita Silvestre and ANGEL LUIS Narayan RN.  Patient verified name,  and correct surgical area prior to sedation.  Nerve block prep started.

## 2019-02-11 NOTE — TRANSFER OF CARE
"Anesthesia Transfer of Care Note    Patient: Mor Hill    Procedure(s) Performed: Procedure(s) (LRB):  REPAIR, TENDON, PATELLAR (Left)    Patient location: PACU    Anesthesia Type: general    Transport from OR: Transported from OR on 2-3 L/min O2 by NC with adequate spontaneous ventilation    Post pain: adequate analgesia    Post assessment: no apparent anesthetic complications and tolerated procedure well    Post vital signs: stable    Level of consciousness: sedated    Nausea/Vomiting: no nausea/vomiting    Complications: none    Transfer of care protocol was followed      Last vitals:   Visit Vitals  /60   Pulse 78   Temp 36.5 °C (97.7 °F) (Skin)   Resp 16   Ht 5' 6" (1.676 m)   Wt 77.1 kg (170 lb)   SpO2 97%   BMI 27.44 kg/m²     "

## 2019-02-11 NOTE — ANESTHESIA PROCEDURE NOTES
Peripheral Block    Patient location during procedure: pre-op   Block not for primary anesthetic.  Reason for block: at surgeon's request and post-op pain management   Post-op Pain Location: left knee  Start time: 2/11/2019 9:41 AM  Timeout: 2/11/2019 9:40 AM   End time: 2/11/2019 9:45 AM  Staffing  Anesthesiologist: Maryuri Silvestre MD  Performed: anesthesiologist   Preanesthetic Checklist  Completed: patient identified, site marked, surgical consent, pre-op evaluation, timeout performed, IV checked, risks and benefits discussed and monitors and equipment checked  Peripheral Block  Patient position: supine  Prep: ChloraPrep  Patient monitoring: heart rate, cardiac monitor, continuous pulse ox, continuous capnometry and frequent blood pressure checks  Block type: adductor canal  Laterality: left  Injection technique: single shot  Needle  Needle type: Stimuplex   Needle gauge: 21 G  Needle length: 4 in  Needle localization: anatomical landmarks and ultrasound guidance   -ultrasound image captured on disc.  Assessment  Injection assessment: negative aspiration, negative parasthesia and local visualized surrounding nerve  Paresthesia pain: none  Heart rate change: no  Slow fractionated injection: yes  Additional Notes  VSS.  [facility] RN monitoring vitals throughout procedure.  Patient tolerated procedure well.     Exparel 20mL

## 2019-02-11 NOTE — PROGRESS NOTES
Patient stated that [facility] would be picking him up after surgery and that his wife arranged it.  Elisa Villatoro RN charge nurse informed.

## 2019-02-12 VITALS
WEIGHT: 170 LBS | HEART RATE: 69 BPM | SYSTOLIC BLOOD PRESSURE: 144 MMHG | RESPIRATION RATE: 18 BRPM | DIASTOLIC BLOOD PRESSURE: 63 MMHG | HEIGHT: 66 IN | TEMPERATURE: 99 F | OXYGEN SATURATION: 94 % | BODY MASS INDEX: 27.32 KG/M2

## 2019-02-12 LAB
ANION GAP SERPL CALC-SCNC: 7 MMOL/L
BASOPHILS # BLD AUTO: 0 K/UL
BASOPHILS # BLD AUTO: 0 K/UL
BASOPHILS NFR BLD: 0.1 %
BASOPHILS NFR BLD: 0.1 %
BUN SERPL-MCNC: 16 MG/DL
CALCIUM SERPL-MCNC: 8.2 MG/DL
CHLORIDE SERPL-SCNC: 105 MMOL/L
CO2 SERPL-SCNC: 24 MMOL/L
CREAT SERPL-MCNC: 1 MG/DL
DIFFERENTIAL METHOD: ABNORMAL
DIFFERENTIAL METHOD: ABNORMAL
EOSINOPHIL # BLD AUTO: 0 K/UL
EOSINOPHIL # BLD AUTO: 0 K/UL
EOSINOPHIL NFR BLD: 0 %
EOSINOPHIL NFR BLD: 0 %
ERYTHROCYTE [DISTWIDTH] IN BLOOD BY AUTOMATED COUNT: 13.2 %
ERYTHROCYTE [DISTWIDTH] IN BLOOD BY AUTOMATED COUNT: 13.2 %
EST. GFR  (AFRICAN AMERICAN): >60 ML/MIN/1.73 M^2
EST. GFR  (NON AFRICAN AMERICAN): >60 ML/MIN/1.73 M^2
GLUCOSE SERPL-MCNC: 167 MG/DL
HCT VFR BLD AUTO: 32.6 %
HCT VFR BLD AUTO: 32.6 %
HGB BLD-MCNC: 11.1 G/DL
HGB BLD-MCNC: 11.1 G/DL
LYMPHOCYTES # BLD AUTO: 0.4 K/UL
LYMPHOCYTES # BLD AUTO: 0.4 K/UL
LYMPHOCYTES NFR BLD: 4.9 %
LYMPHOCYTES NFR BLD: 4.9 %
MCH RBC QN AUTO: 32.3 PG
MCH RBC QN AUTO: 32.3 PG
MCHC RBC AUTO-ENTMCNC: 34 G/DL
MCHC RBC AUTO-ENTMCNC: 34 G/DL
MCV RBC AUTO: 95 FL
MCV RBC AUTO: 95 FL
MONOCYTES # BLD AUTO: 0.5 K/UL
MONOCYTES # BLD AUTO: 0.5 K/UL
MONOCYTES NFR BLD: 5.9 %
MONOCYTES NFR BLD: 5.9 %
NEUTROPHILS # BLD AUTO: 7.7 K/UL
NEUTROPHILS # BLD AUTO: 7.7 K/UL
NEUTROPHILS NFR BLD: 89.1 %
NEUTROPHILS NFR BLD: 89.1 %
PLATELET # BLD AUTO: 293 K/UL
PLATELET # BLD AUTO: 293 K/UL
PMV BLD AUTO: 7.5 FL
PMV BLD AUTO: 7.5 FL
POTASSIUM SERPL-SCNC: 4.2 MMOL/L
RBC # BLD AUTO: 3.43 M/UL
RBC # BLD AUTO: 3.43 M/UL
SODIUM SERPL-SCNC: 136 MMOL/L
WBC # BLD AUTO: 8.6 K/UL
WBC # BLD AUTO: 8.6 K/UL

## 2019-02-12 PROCEDURE — S5010 5% DEXTROSE AND 0.45% SALINE: HCPCS | Performed by: ORTHOPAEDIC SURGERY

## 2019-02-12 PROCEDURE — 97161 PT EVAL LOW COMPLEX 20 MIN: CPT | Performed by: PHYSICAL THERAPIST

## 2019-02-12 PROCEDURE — 25000003 PHARM REV CODE 250: Performed by: ORTHOPAEDIC SURGERY

## 2019-02-12 PROCEDURE — 97116 GAIT TRAINING THERAPY: CPT | Performed by: PHYSICAL THERAPIST

## 2019-02-12 PROCEDURE — 63600175 PHARM REV CODE 636 W HCPCS: Performed by: ORTHOPAEDIC SURGERY

## 2019-02-12 PROCEDURE — G8979 MOBILITY GOAL STATUS: HCPCS | Mod: CI | Performed by: PHYSICAL THERAPIST

## 2019-02-12 PROCEDURE — 85025 COMPLETE CBC W/AUTO DIFF WBC: CPT

## 2019-02-12 PROCEDURE — 94761 N-INVAS EAR/PLS OXIMETRY MLT: CPT

## 2019-02-12 PROCEDURE — 25000003 PHARM REV CODE 250: Performed by: INTERNAL MEDICINE

## 2019-02-12 PROCEDURE — 25000003 PHARM REV CODE 250: Performed by: ANESTHESIOLOGY

## 2019-02-12 PROCEDURE — G8978 MOBILITY CURRENT STATUS: HCPCS | Mod: CK | Performed by: PHYSICAL THERAPIST

## 2019-02-12 PROCEDURE — 36415 COLL VENOUS BLD VENIPUNCTURE: CPT

## 2019-02-12 PROCEDURE — 80048 BASIC METABOLIC PNL TOTAL CA: CPT

## 2019-02-12 RX ORDER — FUROSEMIDE 20 MG/1
20 TABLET ORAL DAILY
Status: DISCONTINUED | OUTPATIENT
Start: 2019-02-13 | End: 2019-02-12 | Stop reason: HOSPADM

## 2019-02-12 RX ORDER — ASPIRIN 325 MG
325 TABLET ORAL 2 TIMES DAILY
Refills: 0
Start: 2019-02-12 | End: 2022-09-28

## 2019-02-12 RX ORDER — ATENOLOL 25 MG/1
25 TABLET ORAL NIGHTLY
Status: DISCONTINUED | OUTPATIENT
Start: 2019-02-13 | End: 2019-02-12 | Stop reason: HOSPADM

## 2019-02-12 RX ADMIN — CEFAZOLIN SODIUM 1 G: 1 SOLUTION INTRAVENOUS at 02:02

## 2019-02-12 RX ADMIN — PANTOPRAZOLE SODIUM 40 MG: 40 TABLET, DELAYED RELEASE ORAL at 08:02

## 2019-02-12 RX ADMIN — OXYCODONE HYDROCHLORIDE 5 MG: 5 TABLET ORAL at 12:02

## 2019-02-12 RX ADMIN — ASPIRIN 325 MG ORAL TABLET 325 MG: 325 PILL ORAL at 08:02

## 2019-02-12 RX ADMIN — LEVOTHYROXINE SODIUM 50 MCG: 50 TABLET ORAL at 06:02

## 2019-02-12 RX ADMIN — DEXTROSE AND SODIUM CHLORIDE: 5; .45 INJECTION, SOLUTION INTRAVENOUS at 08:02

## 2019-02-12 RX ADMIN — POLYETHYLENE GLYCOL 3350 17 G: 17 POWDER, FOR SOLUTION ORAL at 08:02

## 2019-02-12 RX ADMIN — CLOPIDOGREL BISULFATE 75 MG: 75 TABLET ORAL at 08:02

## 2019-02-12 RX ADMIN — OXYCODONE HYDROCHLORIDE 5 MG: 5 TABLET ORAL at 08:02

## 2019-02-12 RX ADMIN — ATORVASTATIN CALCIUM 20 MG: 20 TABLET, FILM COATED ORAL at 08:02

## 2019-02-12 RX ADMIN — FAMOTIDINE 20 MG: 20 TABLET, FILM COATED ORAL at 08:02

## 2019-02-12 NOTE — PLAN OF CARE
Problem: Physical Therapy Goal  Goal: Physical Therapy Goal  Goals to be met by: 2019     Patient will increase functional independence with mobility by performin. Supine to sit with Modified [place]  2. Sit to supine with modified independence  3. Sit to stand with RW and supervision  4. Bed to chair transfer with RW and Supervision with L KI donned and maintaining TTWB on the L LE.   5. Gait x 50ft with RW and Supervision with L KI donned and maintaining TTWB on the L LE.   6. Pt will negotiate 1 step with RW and CGA with L KI donned and maintaining TTWB on the L LE.    Outcome: Ongoing (interventions implemented as appropriate)  PT goals established and pt is progressing with gait and stair training, but is non-compliant with weight bearing restrictions, therefore gait was limited today.

## 2019-02-12 NOTE — DISCHARGE SUMMARY
Discharge Summary  Hospital Medicine    Admit Date: 2/11/2019    Date and Time: 2/12/201912:25 PM    Discharge Attending Physician: Andrea Avendaño MD    Primary Care Physician: Ayden Carter MD    Diagnoses:  Active Hospital Problems    Diagnosis  POA    Tendon rupture, traumatic, patella, left, initial encounter [S86.812A]  s/p Left patellar tendon repair  S/P total knee arthroplasty, left []  Osteoarthritis of left knee   Hypertension [I10]  CAD  Reduced Hearing acquity (uses hearing aids)  Hypothyroidism  Yes        Discharged Condition: Good    Hospital Course:   Patient is a 73 y.o. male admitted to Hospitalist Service from Operation Room s/p left patellar tendon repair performed by Dr. Linda. Patient reportedly has past medical history significant for hypertension, hyperlipidemia, CAD and hypothyroidism. Patient has left TKA performed on 11/27/17 by Dr. Linda. Recently patient suffered a mechanical fall resulting in left patellar fracture. Post-operatively, patient denied chest pain, shortness of breath, abdominal pain, nausea, vomiting, headache, vision changes, focal neuro-deficits, cough or fever. Patient was admitted to Hospitalist medicine service. Patient was followed by orthopedics. Post-operative, patient did well. Pain adequately controlled. Patient participated with physical therapy. Home health and home physical therapy has been arranged. Fall precautions discussed with the patient. Patient to follow up with primary care physician next week and orthopedic doctor in 2 weeks. Post-operative anti-coagulation as per orthopedics recommendations advised. In case of chest pain, shortness of breath, stroke or stroke like symptoms, high grade fever or any signs or symptoms of surgical site wound infection symptoms, patient to return to nearest emergency room as soon as possible. Patient was discharged home in stable condition with following discharge plan of care.     Consults:   Finger    Significant Diagnostic Studies: None  Special Treatments/Procedures: as above  Disposition: Home or Self Care    Medications:  Reconciled Home Medications:   Current Discharge Medication List      START taking these medications    Details   aspirin 325 MG tablet Take 1 tablet (325 mg total) by mouth 2 (two) times daily.  Refills: 0         CONTINUE these medications which have NOT CHANGED    Details   cyclobenzaprine (FLEXERIL) 10 MG tablet Take 10 mg by mouth 2 (two) times daily as needed.      furosemide (LASIX) 20 MG tablet Take 20 mg by mouth once daily.      HYDROcodone-acetaminophen (NORCO) 5-325 mg per tablet TK 1 T PO Q 6 H PRN FOR PAIN  Refills: 0      levothyroxine (SYNTHROID) 50 MCG tablet Take 50 mcg by mouth once daily.      meclizine (ANTIVERT) 25 mg tablet Take 25 mg by mouth as needed.       atenolol (TENORMIN) 25 MG tablet Take 25 mg by mouth every evening.       atorvastatin (LIPITOR) 20 MG tablet Take 20 mg by mouth once daily.      clopidogrel (PLAVIX) 75 mg tablet Take 75 mg by mouth once daily.      ibuprofen (ADVIL,MOTRIN) 200 MG tablet Take 2 tablets by mouth.      omeprazole (PRILOSEC) 20 MG capsule Take 20 mg by mouth once daily.         STOP taking these medications       aspirin (ECOTRIN) 81 MG EC tablet Comments:   Reason for Stopping:             Discharge Procedure Orders   Ambulatory referral to Home Health   Referral Priority: Routine Referral Type: Home Health   Referral Reason: Specialty Services Required   Requested Specialty: Home Health Services   Number of Visits Requested: 1     Diet general     Diet Cardiac     Call MD for:  temperature >100.4     Call MD for:  persistent nausea and vomiting     Call MD for:  severe uncontrolled pain     Call MD for:  difficulty breathing, headache or visual disturbances     Call MD for:  redness, tenderness, or signs of infection (pain, swelling, redness, odor or green/yellow discharge around incision site)     Call MD for:  hives      Call MD for:  persistent dizziness or light-headedness     Call MD for:  extreme fatigue     Sponge bath only until clinic visit     Other restrictions (specify):   Order Comments: PLEASE OBSERVE FALL PRECAUTIONS.    Left knee toe touch weight bearing status.    Knee immobilizer use as per Dr. Linda's recommendations.     Call MD for:   Order Comments: For worsening symptoms, chest pain, shortness of breath, increased abdominal pain, high grade fever, stroke or stroke like symptoms, immediately go to the nearest Emergency Room or call 911 as soon as possible.     Follow-up Information     Renny Linda MD On 2/26/2019.    Specialties:  Orthopedic Surgery, Surgery, Sports Medicine  Why:  @9:45am   Contact information:  [address]  [address]  [place] [address]  [phone]             Ayden Carter MD In 1 week.    Specialty:  Family Medicine  Contact information:  [address]  [address]  [facility]  [place] [address]  [phone]

## 2019-02-12 NOTE — PT/OT/SLP EVAL
Physical Therapy Evaluation    Patient Name:  Mor Hill   MRN:  [id number]    Recommendations:     Discharge Recommendations:  nursing facility, skilled, other (see comments)(HHPT)   Discharge Equipment Recommendations: commode   Barriers to discharge: Decreased caregiver support and pt reports that his wife just had a CEA ~10 days ago, and is scheduled to have the other side done in a few days.    Assessment:     Mor Hill is a 73 y.o. male admitted with a medical diagnosis of s/p fall with L quad tendon rupture and he is now s/p L patellar tendon repair.  He presents with the following impairments/functional limitations:  weakness, gait instability, decreased upper extremity function, decreased ROM, impaired endurance, impaired balance, decreased lower extremity function, decreased safety awareness, impaired self care skills, pain, orthopedic precautions, impaired functional mobilty.  During PT evaluation, pt was repeatedly educated on TTWB on the L LE.  He required CGA for supine<>sit, and CGA for sit<>Stand with RW, L KI donned however, not maintaining TTWB despite education.  Pt was able to ambulate 20ft with RW and CGA with L KI donned, but despite extensive education on weight bearing status, he would not comply.  Pt was assisted to sit down in the recliner, and w/c was obtained.  Pt was then assisted with transfer from recliner <>w/c with RW and CGA.  Pt wheeled down to therapy gym and practiced 1 curb step x2 trials going forward and going backward.  Pt required extensive education on technique to avoid weight through L LE, however he continued to express that at home he normally leads up the step with his L LE and was attempting to politely rebut all education being provided by PT.  He eventually negotiated the curb step with Min A and RW going forward, and CGA with RW going backwards.  Throughout evaluation he was expressing to PT how he has 2 dogs at home that get in the way when getting in/out of  the house, and how he needs to continue fixing the fence where they continue to get out, as well as how he plans to drive his wife to her surgery which is scheduled several days from now.  Despite all education, pt continues to demonstrate poor safety awareness throughout evaluation, therefore, recommend SNF vs HHPT and B[facility].      Rehab Prognosis: Fair; patient would benefit from acute skilled PT services to address these deficits and reach maximum level of function.    Recent Surgery: Procedure(s) (LRB):  REPAIR, TENDON, PATELLAR (Left) 1 Day Post-Op    Plan:     During this hospitalization, patient to be seen BID to address the identified rehab impairments via gait training, therapeutic activities, therapeutic exercises and progress toward the following goals:    · Plan of Care Expires:  02/26/19    Subjective     Chief Complaint: wearing the KI and not being able to place weight through L LE.   Patient/Family Comments/goals: none stated  Pain/Comfort:  · Pain Rating 1: (not rated)  · Location - Side 1: Left  · Location 1: leg  · Pain Addressed 1: Reposition, Distraction    Patients cultural, spiritual, Methodist conflicts given the current situation:      Living Environment:  Pt lives at home with his wife who just had surgery 10 days ago, and needs to have another surgery in several days.  They have a 1 level home with 1 step to enter, and 2 large dogs that make it difficult to mobilize in the house at times.    Prior to admission, patients level of function was independent.  Equipment used at home: walker, rolling, crutches.  DME owned (not currently used): none.  Upon discharge, patient is recommended for SNF vs HHPT.    Objective:     Communicated with nurse Hopper prior to session.  Patient found supine in bed with L KI donned peripheral IV, knee immobilizer, telemetry  upon PT entry to room.  Pt is hyperverbal.     General Precautions: Standard, fall, hearing impaired   Orthopedic Precautions:LLE toe touch  "weight bearing   Braces: Knee immobilizer     Exams:  · Cognitive Exam:  Patient is oriented to Person, Place and time and situation, but poor insight to deficits and safety awarness  · Postural Exam:  Patient presented with the following abnormalities:    · -       Rounded shoulders  · -       Forward head  · RLE ROM: WFL  · RLE Strength: WFL  · LLE ROM: Deficits: knee in KI due to surgery  · LLE Strength: Deficits: grossly 3/5, but knee not tested due to recent surgery.    · Pt reports that both shoulders are bad due to torn rotator cuff's.     Functional Mobility:  · Bed Mobility:     · Supine to Sit: minimum assistance and HOB raised and increased time  · Transfers:     · Sit to Stand:  contact guard assistance with rolling walker  · Bed to Chair: contact guard assistance with  rolling walker  using  Step Transfer  · Gait: 20ft with RW and CGA with L KI donned.  Pt is unable to maintain TTWB on the L LE despite extensive education, therefore distance limited by PT.    · Balance: fair  · Stairs:  Pt ascended/descended 4" curb step with Rolling Walker with no handrails with Contact Guard Assistance, Minimal Assistance and negotiated step forward and then backtward.  Pt required extensive education on stair negotiation technique and to lead up with the R LE.  Pt required Min A to go up the step forward with RW, and CGA to go up the step backward.  Pt continues to demonstrate poor TTWB throughout..       Therapeutic Activities and Exercises:   See Above.     AM-PAC 6 CLICK MOBILITY  Total Score:18     Patient left sitting up in the chair with all lines intact, call button in reach and nurse Ruchi notified.    GOALS:   Multidisciplinary Problems     Physical Therapy Goals        Problem: Physical Therapy Goal    Goal Priority Disciplines Outcome Goal Variances Interventions   Physical Therapy Goal     PT, PT/OT Ongoing (interventions implemented as appropriate)     Description:  Goals to be met by: 2/26/2019 "     Patient will increase functional independence with mobility by performin. Supine to sit with Modified [place]  2. Sit to supine with modified independence  3. Sit to stand with RW and supervision  4. Bed to chair transfer with RW and Supervision with L KI donned and maintaining TTWB on the L LE.   5. Gait x 50ft with RW and Supervision with L KI donned and maintaining TTWB on the L LE.   6. Pt will negotiate 1 step with RW and CGA with L KI donned and maintaining TTWB on the L LE.                      History:     Past Medical History:   Diagnosis Date    Anticoagulant long-term use     Arthritis     COPD (chronic obstructive pulmonary disease)     Coronary artery disease     Fx     RIBS    Hearing loss     LEFT EAR AID; RIGHT EAR OTC DEVICE    Heart attack     Hypertension     Thyroid disease     Vertigo        Past Surgical History:   Procedure Laterality Date    ARTHROPLASTY-KNEE Left 2017    [facility] at 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

## 2019-02-12 NOTE — OP NOTE
DATE OF PROCEDURE:  02/11/2019    ATTENDING PHYSICIAN:  Renny Linda M.D.    FIRST ASSISTANT:  Amado Wolf.    PREOPERATIVE DIAGNOSES:   1.  Left Patellar tendon rupture.  2.  Left Patellar fracture.    POSTOPERATIVE DIAGNOSES:  1.  Left Patellar tendon rupture.  2.  Left Patellar fracture.    PROCEDURE PERFORMED:    1.  Primary repair of left patellar tendon.  2.  Open reduction and internal fixation left patellar fracture.    ESTIMATED BLOOD LOSS:  Minimal.    IV FLUID:  Crystalloid.    ANESTHESIA:  General anesthesia.    PROCEDURE IN DETAIL:  The patient was placed on the operating table in the supine position.  The left knee was prepped and draped in the usual sterile manner for surgery.  Anterior approach was undertaken to the knee.  It was carried down  sharply through the skin.  The patellar rupture was immediately visible.  It was obviously acute with significant amount of clot and hematoma.  It was washed   for repairing.  There was a small bony fragment and the rest of the patellar tendon had been divided transversely directly across all the way from the VMO to  the vastus lateralis.  It was extensive.  We now irrigated and freed the joint of any clot.  We studied the patellar component and it was not loose.  I now began a difficult task of reattachment of the patellar tendon.  First thing, I   did was, I drilled two tunnels in the patella.  I was very careful not to make multiple passes and not to hit the component.  Through these 2 tunnels, I brought two separate FiberWires.  The FiberWires were then roped through the patellar fragment and through the patella tendon.  Thus, reapproximated the patellar tendon and the inferior pole of the patella.  They were tied down over the patella and we had an excellent repair, which was stable through 60 degrees of flexion.  I now placed interrupted 2-0 FiberWire across the VMO all the way to the vastus lateralis.  This was oversewn with a running #1 Vicryl.  We had  a   good repair.  We copiously irrigated.  We closed the subcutaneous with 2-0 Vicryl and the skin with 4-0 Ethibond.  Sterile dressings were applied.  The patient was taken to Recovery Room in stable condition, pending an x-ray.      JATINDER  dd: 02/11/2019 12:35:55 (CST)  td: 02/11/2019 22:16:33 (CST)  Doc ID   #[id number]  Job ID #[id number]    CC:

## 2019-02-12 NOTE — PLAN OF CARE
[facility] sent patient information to N for authorization and acceptance to home health services and delivery of bedside commode through [facility] system.      4:30pm SSC spoke to Teresa with N, they received patient information and will return call once patient information is processed and accepted.SONIA Reyes

## 2019-02-12 NOTE — ASSESSMENT & PLAN NOTE
LLE TTWB  PT and nursing for OOB activity  Pt is elderly and may need a SNF consult if he struggles with functional mobility  Remove brace today @ 1500 to change dressings. Then reapply brace.

## 2019-02-12 NOTE — PLAN OF CARE
"Spoke with the pt at the bedside regarding the discharge plan; he states his wife would not come pick him up yesterday because he believes she made a contract with [facility] to pay them cash for him to go there. He anticipated being discharged home with home health until his wife told him of her plans with [facility]. The pt makes frequent references to vicenteing Gretchen throughout our conversation and has trouble staying focused on the question being asked.   I called Dr Alexei Felix's nurse to see if she was aware of any pending discharge plans to [facility], she states the pt was told at his appt he would be discharging home with home health after the procedure.   I spoke with Corinne at [facility] who states they do not have any communication on the pt except his wife toured the facility last week.     I called the pt's wife, Alyssa (#[phone]) to discuss the discharge plans; she initially stated that she did not come get the pt because no one called her telling her he was discharged. She is agreeable to coming to pick the pt up and taking him home with home health services. As I was saying "goodbye" Mrs Shah asked me if we could keep the pt in the hospital and send him to [facility]. I explained that the pt had an outpt procedure and the discharge plan was as previously discussed before surgery that he was discharging home with home health. She understands what I explained and will be ready to pick him up this afternoon when the pt's nurse calls to tell her he is ready.  Mrs Shah gave verbal consent for the pt's home health to be set up with the first agency assigned by N. Pt choice/disclosure completed accordingly.  The charge nurse, Marjorie confirmed with the pt that he has a RW at home but does need a BSC.....MARGARITA Schulz         02/12/19 2867   Discharge Assessment   Assessment Type Discharge Planning Assessment   Confirmed/corrected address and phone number on facesheet? Yes   Assessment " information obtained from? Patient   Expected Length of Stay (days) 2

## 2019-02-12 NOTE — PLAN OF CARE
Problem: Adult Inpatient Plan of Care  Goal: Plan of Care Review  Outcome: Ongoing (interventions implemented as appropriate)  Vitals stable. POC/Meds reviewed, pt verbalized understanding. Pt is very [place].Tele in place-NSR. Knee immobilizer on. SCD/foot pump on. Denies numbness/tingling. IVPB abx, IV fluids infusing. Dental appliance at bedside. No neuro deficits noted. Urinal within reach. Turns/reposistions self. Hourly/Q2hr rounding performed, safety maintained. Bed in lowest position, wheels locked, SR up x2, call light in easy reach. Will continue to monitor.

## 2019-02-12 NOTE — PROGRESS NOTES
"[facility]  Orthopedics  Progress Note    Patient Name: Mor Hill  MRN: [id number]  Admission Date: 2/11/2019  Hospital Length of Stay: 0 days  Attending Provider: Andrea Avendaño MD  Primary Care Provider: Ayden Carter MD  Follow-up For: Procedure(s) (LRB):  REPAIR, TENDON, PATELLAR (Left)    Post-Operative Day: 1 Day Post-Op  Subjective:     Principal Problem:<principal problem not specified>    Principal Orthopedic Problem: S/P L patella tendon repair    Interval History: none    Review of patient's allergies indicates:   Allergen Reactions    Iodinated contrast- oral and iv dye     Iodine      Other reaction(s): in XRAY CONTRAST --swelling    Iodine and iodide containing products     Pentothal [thiopental sodium]      Trouble waking up, went into a deep sleep       Current Facility-Administered Medications   Medication    aspirin tablet 325 mg    [START ON 2/13/2019] atenolol tablet 25 mg    atorvastatin tablet 20 mg    bisacodyl suppository 10 mg    ceFAZolin (ANCEF) 1 gram in dextrose 5 % 50 mL IVPB (premix)    clopidogrel tablet 75 mg    dextrose 5 % and 0.45 % NaCl infusion    famotidine tablet 20 mg    [START ON 2/13/2019] furosemide tablet 20 mg    lactated ringers infusion    levothyroxine tablet 50 mcg    morphine injection 2 mg    ondansetron disintegrating tablet 8 mg    oxyCODONE immediate release tablet 5 mg    pantoprazole EC tablet 40 mg    polyethylene glycol packet 17 g    sodium chloride 0.9% flush 5 mL    zolpidem tablet 5 mg     Objective:     Vital Signs (Most Recent):  Temp: 97.8 °F (36.6 °C) (02/12/19 0835)  Pulse: 75 (02/12/19 0835)  Resp: 18 (02/12/19 0835)  BP: (!) 133/59 (02/12/19 0835)  SpO2: 98 % (02/12/19 0835) Vital Signs (24h Range):  Temp:  [97.6 °F (36.4 °C)-98.5 °F (36.9 °C)] 97.8 °F (36.6 °C)  Pulse:  [65-88] 75  Resp:  [12-21] 18  SpO2:  [93 %-98 %] 98 %  BP: ()/(49-81) 133/59     Weight: 77.1 kg (170 lb)  Height: 5' 6" (167.6 " cm)  Body mass index is 27.44 kg/m².      Intake/Output Summary (Last 24 hours) at 2/12/2019 0931  Last data filed at 2/12/2019 0810  Gross per 24 hour   Intake 2372.5 ml   Output 1925 ml   Net 447.5 ml       General    Nursing note and vitals reviewed.  Constitutional: He is oriented to person, place, and time. He appears well-developed and well-nourished.   Pulmonary/Chest: Effort normal.   Abdominal: Soft.   Neurological: He is alert and oriented to person, place, and time.   Psychiatric: He has a normal mood and affect. His behavior is normal.             Left Knee Exam     Comments:  Dressing C/D/I. LLE DNVI. Hinged knee brace in place.      Significant Labs:   CBC:   Recent Labs   Lab 02/12/19  0600   WBC 8.60  8.60   HGB 11.1*  11.1*   HCT 32.6*  32.6*     293     CMP:   Recent Labs   Lab 02/12/19  0600      K 4.2      CO2 24   *   BUN 16   CREATININE 1.0   CALCIUM 8.2*   ANIONGAP 7*   EGFRNONAA >60     All pertinent labs within the past 24 hours have been reviewed.    Significant Imaging: None    Assessment/Plan:     Tendon rupture, traumatic, patella, left, initial encounter    LLE TTWB  PT and nursing for OOB activity  Pt is elderly and may need a SNF consult if he struggles with functional mobility  Remove brace today @ 1500 to change dressings. Then reapply brace.           TALA SMITH  Orthopedics  [facility]

## 2019-02-12 NOTE — SUBJECTIVE & OBJECTIVE
"Principal Problem:<principal problem not specified>    Principal Orthopedic Problem: S/P L patella tendon repair    Interval History: none    Review of patient's allergies indicates:   Allergen Reactions    Iodinated contrast- oral and iv dye     Iodine      Other reaction(s): in XRAY CONTRAST --swelling    Iodine and iodide containing products     Pentothal [thiopental sodium]      Trouble waking up, went into a deep sleep       Current Facility-Administered Medications   Medication    aspirin tablet 325 mg    [START ON 2/13/2019] atenolol tablet 25 mg    atorvastatin tablet 20 mg    bisacodyl suppository 10 mg    ceFAZolin (ANCEF) 1 gram in dextrose 5 % 50 mL IVPB (premix)    clopidogrel tablet 75 mg    dextrose 5 % and 0.45 % NaCl infusion    famotidine tablet 20 mg    [START ON 2/13/2019] furosemide tablet 20 mg    lactated ringers infusion    levothyroxine tablet 50 mcg    morphine injection 2 mg    ondansetron disintegrating tablet 8 mg    oxyCODONE immediate release tablet 5 mg    pantoprazole EC tablet 40 mg    polyethylene glycol packet 17 g    sodium chloride 0.9% flush 5 mL    zolpidem tablet 5 mg     Objective:     Vital Signs (Most Recent):  Temp: 97.8 °F (36.6 °C) (02/12/19 0835)  Pulse: 75 (02/12/19 0835)  Resp: 18 (02/12/19 0835)  BP: (!) 133/59 (02/12/19 0835)  SpO2: 98 % (02/12/19 0835) Vital Signs (24h Range):  Temp:  [97.6 °F (36.4 °C)-98.5 °F (36.9 °C)] 97.8 °F (36.6 °C)  Pulse:  [65-88] 75  Resp:  [12-21] 18  SpO2:  [93 %-98 %] 98 %  BP: ()/(49-81) 133/59     Weight: 77.1 kg (170 lb)  Height: 5' 6" (167.6 cm)  Body mass index is 27.44 kg/m².      Intake/Output Summary (Last 24 hours) at 2/12/2019 0931  Last data filed at 2/12/2019 0810  Gross per 24 hour   Intake 2372.5 ml   Output 1925 ml   Net 447.5 ml       General    Nursing note and vitals reviewed.  Constitutional: He is oriented to person, place, and time. He appears well-developed and well-nourished. "   Pulmonary/Chest: Effort normal.   Abdominal: Soft.   Neurological: He is alert and oriented to person, place, and time.   Psychiatric: He has a normal mood and affect. His behavior is normal.             Left Knee Exam     Comments:  Dressing C/D/I. LLE DNVI. Hinged knee brace in place.      Significant Labs:   CBC:   Recent Labs   Lab 02/12/19  0600   WBC 8.60  8.60   HGB 11.1*  11.1*   HCT 32.6*  32.6*     293     CMP:   Recent Labs   Lab 02/12/19  0600      K 4.2      CO2 24   *   BUN 16   CREATININE 1.0   CALCIUM 8.2*   ANIONGAP 7*   EGFRNONAA >60     All pertinent labs within the past 24 hours have been reviewed.    Significant Imaging: None

## 2019-02-13 NOTE — PLAN OF CARE
02/13/19 0749   Final Note   Assessment Type Final Discharge Note   Anticipated Discharge Disposition Home-Health

## 2019-02-15 ENCOUNTER — TELEPHONE (OUTPATIENT)
Dept: ORTHOPEDICS | Facility: CLINIC | Age: 74
End: 2019-02-15

## 2019-02-15 NOTE — TELEPHONE ENCOUNTER
Spoke with Vickie, they are unable to get in touch with the pt. No answer at home or with phone calls.

## 2019-02-15 NOTE — TELEPHONE ENCOUNTER
Vickie Rodriguez @ [phone] with [facility] can not make contact with patient for his care. Went to his home and also have been calling him.

## 2019-02-18 ENCOUNTER — TELEPHONE (OUTPATIENT)
Dept: ORTHOPEDICS | Facility: CLINIC | Age: 74
End: 2019-02-18

## 2019-02-18 NOTE — TELEPHONE ENCOUNTER
Spoke with Gali, she nor home health has been able to reach the pt by way of phone calls and going to the home. They received a msg from the wife that she wants the pt in skilled nursing and not at home. Will speak with Dr. Linda tomorrow and get back with Gali

## 2019-02-19 NOTE — TELEPHONE ENCOUNTER
Spoke with Arminda, the wife finally called them and told them they can start coming to the house next Monday 2/25, not before. The pt has been without any home care since his surgery as he has not answered the phone or door.

## 2019-02-26 ENCOUNTER — OFFICE VISIT (OUTPATIENT)
Dept: ORTHOPEDICS | Facility: CLINIC | Age: 74
End: 2019-02-26
Payer: MEDICARE

## 2019-02-26 VITALS
SYSTOLIC BLOOD PRESSURE: 124 MMHG | BODY MASS INDEX: 27.32 KG/M2 | WEIGHT: 170 LBS | HEIGHT: 66 IN | HEART RATE: 76 BPM | DIASTOLIC BLOOD PRESSURE: 70 MMHG

## 2019-02-26 DIAGNOSIS — M79.672 LEFT FOOT PAIN: ICD-10-CM

## 2019-02-26 DIAGNOSIS — S86.812D: Primary | ICD-10-CM

## 2019-02-26 PROCEDURE — 73560 X-RAY EXAM OF KNEE 1 OR 2: CPT | Mod: LT,,, | Performed by: ORTHOPAEDIC SURGERY

## 2019-02-26 PROCEDURE — 73630 X-RAY EXAM OF FOOT: CPT | Mod: LT,,, | Performed by: ORTHOPAEDIC SURGERY

## 2019-02-26 PROCEDURE — 73630 PR  X-RAY FOOT 3+ VW: ICD-10-PCS | Mod: LT,,, | Performed by: ORTHOPAEDIC SURGERY

## 2019-02-26 PROCEDURE — 99024 PR POST-OP FOLLOW-UP VISIT: ICD-10-PCS | Mod: ,,, | Performed by: ORTHOPAEDIC SURGERY

## 2019-02-26 PROCEDURE — 73560 PR  X-RAY KNEE 1 OR 2 VIEW: ICD-10-PCS | Mod: LT,,, | Performed by: ORTHOPAEDIC SURGERY

## 2019-02-26 PROCEDURE — 99024 POSTOP FOLLOW-UP VISIT: CPT | Mod: ,,, | Performed by: ORTHOPAEDIC SURGERY

## 2019-02-26 RX ORDER — MELOXICAM 15 MG/1
TABLET ORAL
Refills: 1 | COMMUNITY
Start: 2019-02-21 | End: 2020-01-24

## 2019-02-26 RX ORDER — OXYCODONE AND ACETAMINOPHEN 7.5; 325 MG/1; MG/1
TABLET ORAL
Refills: 0 | COMMUNITY
Start: 2019-02-12 | End: 2019-02-26

## 2019-02-26 NOTE — PROGRESS NOTES
[facility] POST-OP NOTE    Subjective:           Chief Complaint:   Chief Complaint   Patient presents with    Left Knee - Post-op Evaluation     .Open reduction and internal fixation left patellar fracture, repair of left patellar tendon 2.11.19. Suture removal. States that his knee is doing good.            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    atenolol (TENORMIN) 25 MG tablet Take 25 mg by mouth every evening.     atorvastatin (LIPITOR) 20 MG tablet Take 20 mg by mouth once daily.    clopidogrel (PLAVIX) 75 mg tablet Take 75 mg by mouth once daily.    cyclobenzaprine (FLEXERIL) 10 MG tablet Take 10 mg by mouth 2 (two) times daily as needed.    furosemide (LASIX) 20 MG tablet Take 20 mg by mouth once daily.    ibuprofen (ADVIL,MOTRIN) 200 MG tablet Take 2 tablets by mouth.    levothyroxine (SYNTHROID) 50 MCG tablet Take 50 mcg by mouth once daily.    meclizine (ANTIVERT) 25 mg tablet Take 25 mg by mouth as needed.     meloxicam (MOBIC) 15 MG tablet TK 1 T PO QD    omeprazole (PRILOSEC) 20  MG capsule Take 20 mg by mouth once daily.     No current facility-administered medications for this visit.        Review of patient's allergies indicates:   Allergen Reactions    Iodinated contrast- oral and iv dye     Iodine      Other reaction(s): in XRAY CONTRAST --swelling    Iodine and iodide containing products     Pentothal [thiopental sodium]      Trouble waking up, went into a deep sleep       History reviewed. No pertinent family history.    Social History     Socioeconomic History    Marital status:      Spouse name: Not on file    Number of children: Not on file    Years of education: Not on file    Highest education level: Not on file   Social Needs    Financial resource strain: Not on file    Food insecurity - worry: Not on file    Food insecurity - inability: Not on file    Transportation needs - medical: Not on file    Transportation needs - non-medical: Not on file   Occupational History    Not on file   Tobacco Use    Smoking status: Former Smoker    Smokeless tobacco: Never Used   Substance and Sexual Activity    Alcohol use: Yes     Comment: daily boubon and cola    Drug use: No    Sexual activity: Not on file   Other Topics Concern    Not on file   Social History Narrative    Not on file       History of present illness: Patient comes in today for his left knee unfortunately he's been completely noncompliant with his postop care. He has not worn his knee immobilizer at all and he comes in with his knee at 90°.      Review of Systems:    Musculoskeletal:  See HPI      Objective:        Physical Examination:    Vital Signs:    Vitals:    02/26/19 1024   BP: 124/70   Pulse: 76       Body mass index is 27.44 kg/m².    This a well-developed, well nourished patient in no acute distress.  They are alert and oriented and cooperative to examination.        Wounds are clean dry and intact. He can extend his leg to within 20° of full extension. He has mild swelling around the  ankle. He has hammertoes.     Pertinent New Results:    XRAY Report / Interpretation:   AP and lateral and oblique of the left foot demonstrates prior anterior and distal foot surgery. No new fractures or subluxations.     AP and lateral of the left knee demonstrates superior subluxation of the patella. The patella alysia is not as advanced as it was. Prior preoperative x-rays   Assessment/Plan:      This is a gentleman who had an issue patella tendon rupture. I believe the surgery is partially filled at least secondary to lack of compliance. I spoke to him about revision but he assures me that he is not going to wear any sort of long leg brace. He does not want any further surgery whatsoever. I placed him into a short immobilizer that he states he will use. I will check him in 4 weeks.   This note was created using Dragon voice recognition software that occasionally misinterpreted phrases or words.

## 2019-12-26 DIAGNOSIS — E07.9 THYROID DISEASE: Primary | ICD-10-CM

## 2019-12-26 RX ORDER — LEVOTHYROXINE SODIUM 50 UG/1
50 TABLET ORAL DAILY
Qty: 90 TABLET | Refills: 1 | Status: SHIPPED | OUTPATIENT
Start: 2019-12-26 | End: 2020-07-14 | Stop reason: SDUPTHER

## 2019-12-26 NOTE — TELEPHONE ENCOUNTER
----- Message from Brina Hernandez sent at 12/26/2019  8:54 AM CST -----  Refill for levothyroxine Sodium 50 mcg. Qty 90. Jose Manuel on . Pt #[phone]

## 2020-01-10 ENCOUNTER — TELEPHONE (OUTPATIENT)
Dept: FAMILY MEDICINE | Facility: CLINIC | Age: 75
End: 2020-01-10

## 2020-01-10 DIAGNOSIS — I10 HYPERTENSION, UNSPECIFIED TYPE: ICD-10-CM

## 2020-01-10 DIAGNOSIS — E07.9 THYROID DISEASE: ICD-10-CM

## 2020-01-10 DIAGNOSIS — I25.10 CORONARY ARTERY DISEASE, ANGINA PRESENCE UNSPECIFIED, UNSPECIFIED VESSEL OR LESION TYPE, UNSPECIFIED WHETHER NATIVE OR TRANSPLANTED HEART: ICD-10-CM

## 2020-01-10 DIAGNOSIS — Z79.899 ENCOUNTER FOR LONG-TERM (CURRENT) USE OF OTHER MEDICATIONS: ICD-10-CM

## 2020-01-10 DIAGNOSIS — Z00.00 ROUTINE GENERAL MEDICAL EXAMINATION AT A HEALTH CARE FACILITY: Primary | ICD-10-CM

## 2020-01-17 LAB
ALBUMIN SERPL-MCNC: 4 G/DL (ref 3.6–5.1)
ALBUMIN/CREAT UR: 7 MCG/MG CREAT
ALBUMIN/GLOB SERPL: 1.5 (CALC) (ref 1–2.5)
ALP SERPL-CCNC: 46 U/L (ref 40–115)
ALT SERPL-CCNC: 17 U/L (ref 9–46)
APPEARANCE UR: CLEAR
AST SERPL-CCNC: 19 U/L (ref 10–35)
BACTERIA #/AREA URNS HPF: NORMAL /HPF
BACTERIA UR CULT: NORMAL
BASOPHILS # BLD AUTO: 38 CELLS/UL (ref 0–200)
BASOPHILS NFR BLD AUTO: 0.7 %
BILIRUB SERPL-MCNC: 0.6 MG/DL (ref 0.2–1.2)
BILIRUB UR QL STRIP: NEGATIVE
BUN SERPL-MCNC: 20 MG/DL (ref 7–25)
BUN/CREAT SERPL: ABNORMAL (CALC) (ref 6–22)
CALCIUM SERPL-MCNC: 9.5 MG/DL (ref 8.6–10.3)
CHLORIDE SERPL-SCNC: 103 MMOL/L (ref 98–110)
CHOLEST SERPL-MCNC: 150 MG/DL
CHOLEST/HDLC SERPL: 3.3 (CALC)
CO2 SERPL-SCNC: 28 MMOL/L (ref 20–32)
COLOR UR: YELLOW
CREAT SERPL-MCNC: 1.13 MG/DL (ref 0.7–1.18)
CREAT UR-MCNC: 69 MG/DL (ref 20–320)
EOSINOPHIL # BLD AUTO: 270 CELLS/UL (ref 15–500)
EOSINOPHIL NFR BLD AUTO: 5 %
ERYTHROCYTE [DISTWIDTH] IN BLOOD BY AUTOMATED COUNT: 12.2 % (ref 11–15)
GFRSERPLBLD MDRD-ARVRAT: 64 ML/MIN/1.73M2
GLOBULIN SER CALC-MCNC: 2.7 G/DL (CALC) (ref 1.9–3.7)
GLUCOSE SERPL-MCNC: 104 MG/DL (ref 65–99)
GLUCOSE UR QL STRIP: NEGATIVE
HCT VFR BLD AUTO: 42.4 % (ref 38.5–50)
HDLC SERPL-MCNC: 46 MG/DL
HGB BLD-MCNC: 14.7 G/DL (ref 13.2–17.1)
HGB UR QL STRIP: NEGATIVE
HYALINE CASTS #/AREA URNS LPF: NORMAL /LPF
KETONES UR QL STRIP: NEGATIVE
LDLC SERPL CALC-MCNC: 85 MG/DL (CALC)
LEUKOCYTE ESTERASE UR QL STRIP: NEGATIVE
LYMPHOCYTES # BLD AUTO: 1512 CELLS/UL (ref 850–3900)
LYMPHOCYTES NFR BLD AUTO: 28 %
MCH RBC QN AUTO: 32.1 PG (ref 27–33)
MCHC RBC AUTO-ENTMCNC: 34.7 G/DL (ref 32–36)
MCV RBC AUTO: 92.6 FL (ref 80–100)
MICROALBUMIN UR-MCNC: 0.5 MG/DL
MONOCYTES # BLD AUTO: 632 CELLS/UL (ref 200–950)
MONOCYTES NFR BLD AUTO: 11.7 %
NEUTROPHILS # BLD AUTO: 2948 CELLS/UL (ref 1500–7800)
NEUTROPHILS NFR BLD AUTO: 54.6 %
NITRITE UR QL STRIP: NEGATIVE
NONHDLC SERPL-MCNC: 104 MG/DL (CALC)
PH UR STRIP: 5.5 [PH] (ref 5–8)
PLATELET # BLD AUTO: 255 THOUSAND/UL (ref 140–400)
PMV BLD REES-ECKER: 10 FL (ref 7.5–12.5)
POTASSIUM SERPL-SCNC: 4.6 MMOL/L (ref 3.5–5.3)
PROT SERPL-MCNC: 6.7 G/DL (ref 6.1–8.1)
PROT UR QL STRIP: NEGATIVE
RBC # BLD AUTO: 4.58 MILLION/UL (ref 4.2–5.8)
RBC #/AREA URNS HPF: NORMAL /HPF
SODIUM SERPL-SCNC: 140 MMOL/L (ref 135–146)
SP GR UR STRIP: 1.01 (ref 1–1.03)
SQUAMOUS #/AREA URNS HPF: NORMAL /HPF
TRIGL SERPL-MCNC: 98 MG/DL
TSH SERPL-ACNC: 5.02 MIU/L (ref 0.4–4.5)
WBC # BLD AUTO: 5.4 THOUSAND/UL (ref 3.8–10.8)
WBC #/AREA URNS HPF: NORMAL /HPF

## 2020-01-24 ENCOUNTER — OFFICE VISIT (OUTPATIENT)
Dept: FAMILY MEDICINE | Facility: CLINIC | Age: 75
End: 2020-01-24
Payer: MEDICARE

## 2020-01-24 VITALS
DIASTOLIC BLOOD PRESSURE: 72 MMHG | HEIGHT: 65 IN | HEART RATE: 80 BPM | BODY MASS INDEX: 28.99 KG/M2 | SYSTOLIC BLOOD PRESSURE: 116 MMHG | WEIGHT: 174 LBS

## 2020-01-24 DIAGNOSIS — Z95.1 HISTORY OF CORONARY ARTERY BYPASS GRAFT: ICD-10-CM

## 2020-01-24 DIAGNOSIS — G62.9 NEUROPATHY: ICD-10-CM

## 2020-01-24 DIAGNOSIS — M17.32 POST-TRAUMATIC OSTEOARTHRITIS OF LEFT KNEE: Primary | ICD-10-CM

## 2020-01-24 DIAGNOSIS — E03.9 ACQUIRED HYPOTHYROIDISM: ICD-10-CM

## 2020-01-24 DIAGNOSIS — G89.29 CHRONIC LEFT SHOULDER PAIN: ICD-10-CM

## 2020-01-24 DIAGNOSIS — M25.512 CHRONIC LEFT SHOULDER PAIN: ICD-10-CM

## 2020-01-24 DIAGNOSIS — Z96.652 S/P TOTAL KNEE ARTHROPLASTY, LEFT: ICD-10-CM

## 2020-01-24 DIAGNOSIS — I25.10 CORONARY ARTERY DISEASE INVOLVING NATIVE HEART WITHOUT ANGINA PECTORIS, UNSPECIFIED VESSEL OR LESION TYPE: ICD-10-CM

## 2020-01-24 DIAGNOSIS — E07.9 THYROID DISEASE: ICD-10-CM

## 2020-01-24 DIAGNOSIS — I10 HYPERTENSION, UNSPECIFIED TYPE: ICD-10-CM

## 2020-01-24 PROCEDURE — 3074F PR MOST RECENT SYSTOLIC BLOOD PRESSURE < 130 MM HG: ICD-10-PCS | Mod: S$GLB,,, | Performed by: FAMILY MEDICINE

## 2020-01-24 PROCEDURE — 1159F PR MEDICATION LIST DOCUMENTED IN MEDICAL RECORD: ICD-10-PCS | Mod: S$GLB,,, | Performed by: FAMILY MEDICINE

## 2020-01-24 PROCEDURE — 99214 OFFICE O/P EST MOD 30 MIN: CPT | Mod: S$GLB,,, | Performed by: FAMILY MEDICINE

## 2020-01-24 PROCEDURE — 1159F MED LIST DOCD IN RCRD: CPT | Mod: S$GLB,,, | Performed by: FAMILY MEDICINE

## 2020-01-24 PROCEDURE — 99214 PR OFFICE/OUTPT VISIT, EST, LEVL IV, 30-39 MIN: ICD-10-PCS | Mod: S$GLB,,, | Performed by: FAMILY MEDICINE

## 2020-01-24 PROCEDURE — 3074F SYST BP LT 130 MM HG: CPT | Mod: S$GLB,,, | Performed by: FAMILY MEDICINE

## 2020-01-24 PROCEDURE — 3078F PR MOST RECENT DIASTOLIC BLOOD PRESSURE < 80 MM HG: ICD-10-PCS | Mod: S$GLB,,, | Performed by: FAMILY MEDICINE

## 2020-01-24 PROCEDURE — 3078F DIAST BP <80 MM HG: CPT | Mod: S$GLB,,, | Performed by: FAMILY MEDICINE

## 2020-01-24 PROCEDURE — 1101F PR PT FALLS ASSESS DOC 0-1 FALLS W/OUT INJ PAST YR: ICD-10-PCS | Mod: S$GLB,,, | Performed by: FAMILY MEDICINE

## 2020-01-24 PROCEDURE — 1101F PT FALLS ASSESS-DOCD LE1/YR: CPT | Mod: S$GLB,,, | Performed by: FAMILY MEDICINE

## 2020-01-24 RX ORDER — CYCLOBENZAPRINE HCL 10 MG
10 TABLET ORAL 2 TIMES DAILY PRN
Qty: 60 TABLET | Refills: 2 | Status: SHIPPED | OUTPATIENT
Start: 2020-01-24 | End: 2020-07-29 | Stop reason: SDUPTHER

## 2020-01-24 RX ORDER — LORATADINE 10 MG/1
10 TABLET ORAL DAILY
COMMUNITY

## 2020-01-24 RX ORDER — AMOXICILLIN 500 MG/1
CAPSULE ORAL
COMMUNITY
Start: 2019-11-21 | End: 2020-01-24

## 2020-01-24 NOTE — PROGRESS NOTES
SUBJECTIVE:    Patient ID: Mor Hill is a 74 y.o. male.    Chief Complaint: Follow-up (brought bottles tb// wants to hold off on cscope)    This 74-year-old male has pain in his left shoulder and left wrist significantly.  Two thousand seventeen he had a left total knee replacement.  Two thousand nineteen he had injured is patella and had a left knee patella tendon repair.  He also has left shoulder has had 3 rotator cuff surgeries the last 2 by ,.  He is left-handed as his primary dominant side.  He is left wrist pains he handles with the left wrist splint and using Aleve and Flexeril 10 mg b.i.d..  He still somewhat sedentary but gets around okay and does the grocery shopping for his family.    He has history of coronary artery bypass grafting but has not seen his cardiologist in 2 years.  Dr. Reyes      Telephone on 01/10/2020   Component Date Value Ref Range Status    WBC 01/16/2020 5.4  3.8 - 10.8 Thousand/uL Final    RBC 01/16/2020 4.58  4.20 - 5.80 Million/uL Final    Hemoglobin 01/16/2020 14.7  13.2 - 17.1 g/dL Final    Hematocrit 01/16/2020 42.4  38.5 - 50.0 % Final    Mean Corpuscular Volume 01/16/2020 92.6  80.0 - 100.0 fL Final    Mean Corpuscular Hemoglobin 01/16/2020 32.1  27.0 - 33.0 pg Final    Mean Corpuscular Hemoglobin Conc 01/16/2020 34.7  32.0 - 36.0 g/dL Final    RDW 01/16/2020 12.2  11.0 - 15.0 % Final    Platelets 01/16/2020 255  140 - 400 Thousand/uL Final    MPV 01/16/2020 10.0  7.5 - 12.5 fL Final    Neutrophils Absolute 01/16/2020 2,948  1,500 - 7,800 cells/uL Final    Lymph # 01/16/2020 1,512  850 - 3,900 cells/uL Final    Mono # 01/16/2020 632  200 - 950 cells/uL Final    Eos # 01/16/2020 270  15 - 500 cells/uL Final    Baso # 01/16/2020 38  0 - 200 cells/uL Final    Neutrophils Relative 01/16/2020 54.6  % Final    Lymph% 01/16/2020 28.0  % Final    Mono% 01/16/2020 11.7  % Final    Eosinophil% 01/16/2020 5.0  % Final    Basophil% 01/16/2020 0.7  %  Final    Glucose 01/16/2020 104* 65 - 99 mg/dL Final    BUN, Bld 01/16/2020 20  7 - 25 mg/dL Final    Creatinine 01/16/2020 1.13  0.70 - 1.18 mg/dL Final    eGFR if non African American 01/16/2020 64  > OR = 60 mL/min/1.73m2 Final    eGFR if African American 01/16/2020 74  > OR = 60 mL/min/1.73m2 Final    BUN/Creatinine Ratio 01/16/2020 NOT APPLICABLE  6 - 22 (calc) Final    Sodium 01/16/2020 140  135 - 146 mmol/L Final    Potassium 01/16/2020 4.6  3.5 - 5.3 mmol/L Final    Chloride 01/16/2020 103  98 - 110 mmol/L Final    CO2 01/16/2020 28  20 - 32 mmol/L Final    Calcium 01/16/2020 9.5  8.6 - 10.3 mg/dL Final    Total Protein 01/16/2020 6.7  6.1 - 8.1 g/dL Final    Albumin 01/16/2020 4.0  3.6 - 5.1 g/dL Final    Globulin, Total 01/16/2020 2.7  1.9 - 3.7 g/dL (calc) Final    Albumin/Globulin Ratio 01/16/2020 1.5  1.0 - 2.5 (calc) Final    Total Bilirubin 01/16/2020 0.6  0.2 - 1.2 mg/dL Final    Alkaline Phosphatase 01/16/2020 46  40 - 115 U/L Final    AST 01/16/2020 19  10 - 35 U/L Final    ALT 01/16/2020 17  9 - 46 U/L Final    Cholesterol 01/16/2020 150  <200 mg/dL Final    HDL 01/16/2020 46  >40 mg/dL Final    Triglycerides 01/16/2020 98  <150 mg/dL Final    LDL Cholesterol 01/16/2020 85  mg/dL (calc) Final    Hdl/Cholesterol Ratio 01/16/2020 3.3  <5.0 (calc) Final    Non HDL Chol. (LDL+VLDL) 01/16/2020 104  <130 mg/dL (calc) Final    TSH 01/16/2020 5.02* 0.40 - 4.50 mIU/L Final    Color, UA 01/16/2020 YELLOW  YELLOW Final    Appearance, UA 01/16/2020 CLEAR  CLEAR Final    Specific Gravity, UA 01/16/2020 1.015  1.001 - 1.035 Final    pH, UA 01/16/2020 5.5  5.0 - 8.0 Final    Glucose, UA 01/16/2020 NEGATIVE  NEGATIVE Final    Bilirubin, UA 01/16/2020 NEGATIVE  NEGATIVE Final    Ketones, UA 01/16/2020 NEGATIVE  NEGATIVE Final    Occult Blood UA 01/16/2020 NEGATIVE  NEGATIVE Final    Protein, UA 01/16/2020 NEGATIVE  NEGATIVE Final    Nitrite, UA 01/16/2020 NEGATIVE  NEGATIVE  Final    Leukocytes, UA 01/16/2020 NEGATIVE  NEGATIVE Final    WBC Casts, UA 01/16/2020 NONE SEEN  < OR = 5 /HPF Final    RBC Casts, UA 01/16/2020 NONE SEEN  < OR = 2 /HPF Final    Squam Epithel, UA 01/16/2020 NONE SEEN  < OR = 5 /HPF Final    Bacteria, UA 01/16/2020 NONE SEEN  NONE SEEN /HPF Final    Hyaline Casts, UA 01/16/2020 NONE SEEN  NONE SEEN /LPF Final    Reflexive Urine Culture 01/16/2020 NO CULTURE INDICATED   Final    Creatinine, Random Ur 01/16/2020 69  20 - 320 mg/dL Final    Microalb, Ur 01/16/2020 0.5  See Note: mg/dL Final    Microalb Creat Ratio 01/16/2020 7  <30 mcg/mg creat Final       Past Medical History:   Diagnosis Date    Anticoagulant long-term use     Arthritis     COPD (chronic obstructive pulmonary disease)     Coronary artery disease     Fx     RIBS    Hearing loss     LEFT EAR AID; RIGHT EAR OTC DEVICE    Heart attack     Hypertension     Thyroid disease     Vertigo      Past Surgical History:   Procedure Laterality Date    bilateral hammer toe      X 5    CARDIAC SURGERY      bypass X 3, 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    Marital Status:   Alcohol History:  reports that he drinks alcohol.  Tobacco History:  reports that he has quit smoking. He has never used smokeless tobacco.  Drug History:  reports that he does not use drugs.    Review of patient's allergies indicates:   Allergen Reactions    Iodinated contrast media     Iodine      Other reaction(s): in XRAY CONTRAST --swelling    Iodine and iodide containing products     Pentothal [thiopental sodium]      Trouble waking up,  went into a deep sleep       Current Outpatient Medications:     atenolol (TENORMIN) 25 MG tablet, Take 25 mg by mouth every evening. , Disp: , Rfl:     atorvastatin (LIPITOR) 20 MG tablet, Take 20 mg by mouth once daily., Disp: , Rfl:     clopidogrel (PLAVIX) 75 mg tablet, Take 75 mg by mouth once daily., Disp: , Rfl:     cyclobenzaprine (FLEXERIL) 10 MG tablet, Take 1 tablet (10 mg total) by mouth 2 (two) times daily as needed., Disp: 60 tablet, Rfl: 2    furosemide (LASIX) 20 MG tablet, Take 20 mg by mouth once daily., Disp: , Rfl:     ibuprofen (ADVIL,MOTRIN) 200 MG tablet, Take 2 tablets by mouth., Disp: , Rfl:     levothyroxine (SYNTHROID) 50 MCG tablet, Take 1 tablet (50 mcg total) by mouth once daily., Disp: 90 tablet, Rfl: 1    loratadine (CLARITIN) 10 mg tablet, Take 10 mg by mouth once daily., Disp: , Rfl:     meclizine (ANTIVERT) 25 mg tablet, Take 25 mg by mouth as needed. , Disp: , Rfl:     omeprazole (PRILOSEC) 20 MG capsule, Take 20 mg by mouth once daily., Disp: , Rfl:     phenylephrine HCl/acetaminophn (SINUS RELIEF, NON-DROWSY, ORAL), Take by mouth., Disp: , Rfl:     aspirin 325 MG tablet, Take 1 tablet (325 mg total) by mouth 2 (two) times daily., Disp: , Rfl: 0    FLUAD [id number], 65 YR UP,,PF, 45 mcg (15 mcg x 3)/0.5 mL Syrg, ADMINISTER 0.5ML IN THE MUSCLE AS DIRECTED, Disp: , Rfl:     Review of Systems   Constitutional: Positive for appetite change. Negative for chills, fatigue, fever and unexpected weight change.   HENT: Negative for congestion, ear pain and trouble swallowing.    Eyes: Negative for pain, discharge and visual disturbance.   Respiratory: Negative for apnea, cough, shortness of breath and wheezing.    Cardiovascular: Negative for chest pain and leg swelling.   Gastrointestinal: Negative for abdominal pain, blood in stool, constipation, diarrhea, nausea and vomiting.   Endocrine: Negative for cold intolerance, heat intolerance and polydipsia.   Genitourinary:  "Negative for dysuria, hematuria, testicular pain and urgency.        Nocturia 2 x nite   Musculoskeletal: Positive for arthralgias (lf wrist pain  daily , wears a  brace, ). Negative for gait problem, joint swelling and myalgias.   Neurological: Negative for dizziness, seizures and numbness (left  foot numbness, left hand  numbness).   Psychiatric/Behavioral: Negative for agitation, behavioral problems, hallucinations and sleep disturbance. The patient is not nervous/anxious.           Objective:      Vitals:    01/24/20 1150   BP: 116/72   Pulse: 80   Weight: 78.9 kg (174 lb)   Height: 5' 5" (1.651 m)     Body mass index is 28.96 kg/m².  Physical Exam   Constitutional: He is oriented to person, place, and time. He appears well-developed and well-nourished.   Elderly male with no apparent distress   HENT:   Head: Normocephalic and atraumatic.   Right Ear: External ear normal.   Left Ear: External ear normal.   Nose: Nose normal.   Mouth/Throat: Oropharynx is clear and moist.   Eyes: Pupils are equal, round, and reactive to light. EOM are normal.   Neck: Normal range of motion. Neck supple. Carotid bruit is not present. No thyromegaly present.   Cardiovascular: Normal rate, regular rhythm, normal heart sounds and intact distal pulses.   No murmur heard.  Midline chest incision well healed nontender to palpation   Pulmonary/Chest: Effort normal and breath sounds normal. He has no wheezes. He has no rales.   Abdominal: Soft. Bowel sounds are normal. He exhibits no distension. There is no hepatosplenomegaly. There is no tenderness.   Musculoskeletal: Normal range of motion. He exhibits no tenderness or deformity.        Lumbar back: Normal. He exhibits no pain and no spasm.   Bends 90 degrees at  waist, left shot shoulder has significant scar.  He does have full flexion with some pain, left total knee replacement has significant scar and some pain on flexion and extension but good range of motion overall.  He has a slow " antalgic gait   Lymphadenopathy:     He has no cervical adenopathy.   Neurological: He is alert and oriented to person, place, and time. No cranial nerve deficit. Coordination normal.   Skin: Skin is warm and dry. No rash noted.   Psychiatric: He has a normal mood and affect. His behavior is normal. Judgment and thought content normal.   Nursing note and vitals reviewed.        Assessment:       1. Post-traumatic osteoarthritis of left knee    2. Acquired hypothyroidism    3. Thyroid disease    4. Coronary artery disease involving native heart without angina pectoris, unspecified vessel or lesion type    5. Hypertension, unspecified type    6. S/P total knee arthroplasty, left    7. Neuropathy    8. Chronic left shoulder pain    9. History of coronary artery bypass graft         Plan:       Post-traumatic osteoarthritis of left knee  -     cyclobenzaprine (FLEXERIL) 10 MG tablet; Take 1 tablet (10 mg total) by mouth 2 (two) times daily as needed.  Dispense: 60 tablet; Refill: 2  Continue Aleve and cyclobenzaprine for treatment of chronic pain left knee and left shoulder  Acquired hypothyroidism  TSH is elevated at 5.06, he is not taking his levothyroxine on empty stomach in the morning however.  He was advised to do so and we will recheck his labs in 3 months  Thyroid disease    Coronary artery disease involving native heart without angina pectoris, unspecified vessel or lesion type  Doing well but has no current plans for follow-up with Cardiology.  I will refer him back to Dr. Reyes to evaluate for stress testing or surveillance  Hypertension, unspecified type  Well controlled on current regimen  S/P total knee arthroplasty, left    Neuropathy    Chronic left shoulder pain  Numerous left shoulder surgeries of left him with discomfort in the shoulder but he has good function  History of coronary artery bypass graft      Follow up in about 6 months (around 7/24/2020) for júnior- cad.

## 2020-04-27 ENCOUNTER — TELEPHONE (OUTPATIENT)
Dept: FAMILY MEDICINE | Facility: CLINIC | Age: 75
End: 2020-04-27

## 2020-04-29 ENCOUNTER — TELEPHONE (OUTPATIENT)
Dept: FAMILY MEDICINE | Facility: CLINIC | Age: 75
End: 2020-04-29

## 2020-04-29 DIAGNOSIS — E03.9 ACQUIRED HYPOTHYROIDISM: Primary | ICD-10-CM

## 2020-04-29 NOTE — TELEPHONE ENCOUNTER
Spoke to pt. Advised of the iPad but he had complaints and questions about [facility] that I cannot give him an answer for.   Please sign off on new lab order as he is going to try to go somewhere else to get it done

## 2020-04-29 NOTE — TELEPHONE ENCOUNTER
He has to go there and sign in on their ipad to check in for his labs. Not sure of a number for them.

## 2020-04-29 NOTE — TELEPHONE ENCOUNTER
----- Message from Berta Bland sent at 4/29/2020  3:30 PM CDT -----  Contact: Mor Hill   Pt says that he went to the [facility] on [address] and nobody came to the door and he saw other patients waiting . He also says that he tried calling them but the phone isn't working. He states that he can't get through to them at all. He would like to know what he should do about this in regards to getting his blood work done? Please give him a call back to let him know.   Pt# [phone]

## 2020-05-09 LAB — TSH SERPL-ACNC: 1.93 MIU/L (ref 0.4–4.5)

## 2020-05-11 ENCOUNTER — TELEPHONE (OUTPATIENT)
Dept: FAMILY MEDICINE | Facility: CLINIC | Age: 75
End: 2020-05-11

## 2020-05-11 NOTE — TELEPHONE ENCOUNTER
----- Message from Ayden Carter MD sent at 5/9/2020  1:09 PM CDT -----  Call patient.  Thyroid levels are normal.  Continue current medication.

## 2020-07-14 DIAGNOSIS — E07.9 THYROID DISEASE: ICD-10-CM

## 2020-07-14 RX ORDER — LEVOTHYROXINE SODIUM 50 UG/1
50 TABLET ORAL DAILY
Qty: 90 TABLET | Refills: 1 | Status: SHIPPED | OUTPATIENT
Start: 2020-07-14 | End: 2020-07-29 | Stop reason: SDUPTHER

## 2020-07-14 NOTE — TELEPHONE ENCOUNTER
----- Message from Weston Ferrari sent at 7/14/2020 10:25 AM CDT -----  Regarding: refills  Levothyroxine   [facility]   Pt [phone]

## 2020-07-20 ENCOUNTER — TELEPHONE (OUTPATIENT)
Dept: FAMILY MEDICINE | Facility: CLINIC | Age: 75
End: 2020-07-20

## 2020-07-20 NOTE — TELEPHONE ENCOUNTER
LMOR to let pt know he is due to have fasting labs before his next office visit . 07/29/2020 at 11:00 AM

## 2020-07-22 ENCOUNTER — TELEPHONE (OUTPATIENT)
Dept: FAMILY MEDICINE | Facility: CLINIC | Age: 75
End: 2020-07-22

## 2020-07-29 ENCOUNTER — OFFICE VISIT (OUTPATIENT)
Dept: FAMILY MEDICINE | Facility: CLINIC | Age: 75
End: 2020-07-29
Payer: MEDICARE

## 2020-07-29 VITALS
BODY MASS INDEX: 29.16 KG/M2 | HEART RATE: 72 BPM | WEIGHT: 175 LBS | HEIGHT: 65 IN | SYSTOLIC BLOOD PRESSURE: 126 MMHG | DIASTOLIC BLOOD PRESSURE: 70 MMHG

## 2020-07-29 DIAGNOSIS — I25.10 CORONARY ARTERY DISEASE INVOLVING NATIVE HEART WITHOUT ANGINA PECTORIS, UNSPECIFIED VESSEL OR LESION TYPE: Primary | ICD-10-CM

## 2020-07-29 DIAGNOSIS — E07.9 THYROID DISEASE: ICD-10-CM

## 2020-07-29 DIAGNOSIS — M17.32 POST-TRAUMATIC OSTEOARTHRITIS OF LEFT KNEE: ICD-10-CM

## 2020-07-29 DIAGNOSIS — K21.9 GASTROESOPHAGEAL REFLUX DISEASE WITHOUT ESOPHAGITIS: ICD-10-CM

## 2020-07-29 DIAGNOSIS — I10 HYPERTENSION, UNSPECIFIED TYPE: ICD-10-CM

## 2020-07-29 DIAGNOSIS — E78.2 MIXED HYPERLIPIDEMIA: ICD-10-CM

## 2020-07-29 DIAGNOSIS — Z12.5 SPECIAL SCREENING FOR MALIGNANT NEOPLASM OF PROSTATE: ICD-10-CM

## 2020-07-29 PROCEDURE — 3008F PR BODY MASS INDEX (BMI) DOCUMENTED: ICD-10-PCS | Mod: S$GLB,,, | Performed by: FAMILY MEDICINE

## 2020-07-29 PROCEDURE — 99214 PR OFFICE/OUTPT VISIT, EST, LEVL IV, 30-39 MIN: ICD-10-PCS | Mod: S$GLB,,, | Performed by: FAMILY MEDICINE

## 2020-07-29 PROCEDURE — 3078F PR MOST RECENT DIASTOLIC BLOOD PRESSURE < 80 MM HG: ICD-10-PCS | Mod: S$GLB,,, | Performed by: FAMILY MEDICINE

## 2020-07-29 PROCEDURE — 1159F MED LIST DOCD IN RCRD: CPT | Mod: S$GLB,,, | Performed by: FAMILY MEDICINE

## 2020-07-29 PROCEDURE — 3074F PR MOST RECENT SYSTOLIC BLOOD PRESSURE < 130 MM HG: ICD-10-PCS | Mod: S$GLB,,, | Performed by: FAMILY MEDICINE

## 2020-07-29 PROCEDURE — 3074F SYST BP LT 130 MM HG: CPT | Mod: S$GLB,,, | Performed by: FAMILY MEDICINE

## 2020-07-29 PROCEDURE — 1101F PT FALLS ASSESS-DOCD LE1/YR: CPT | Mod: S$GLB,,, | Performed by: FAMILY MEDICINE

## 2020-07-29 PROCEDURE — 99214 OFFICE O/P EST MOD 30 MIN: CPT | Mod: S$GLB,,, | Performed by: FAMILY MEDICINE

## 2020-07-29 PROCEDURE — 3008F BODY MASS INDEX DOCD: CPT | Mod: S$GLB,,, | Performed by: FAMILY MEDICINE

## 2020-07-29 PROCEDURE — 1159F PR MEDICATION LIST DOCUMENTED IN MEDICAL RECORD: ICD-10-PCS | Mod: S$GLB,,, | Performed by: FAMILY MEDICINE

## 2020-07-29 PROCEDURE — 3078F DIAST BP <80 MM HG: CPT | Mod: S$GLB,,, | Performed by: FAMILY MEDICINE

## 2020-07-29 PROCEDURE — 1101F PR PT FALLS ASSESS DOC 0-1 FALLS W/OUT INJ PAST YR: ICD-10-PCS | Mod: S$GLB,,, | Performed by: FAMILY MEDICINE

## 2020-07-29 RX ORDER — LEVOTHYROXINE SODIUM 50 UG/1
50 TABLET ORAL DAILY
Qty: 90 TABLET | Refills: 1 | Status: SHIPPED | OUTPATIENT
Start: 2020-07-29 | End: 2021-01-27

## 2020-07-29 RX ORDER — ATENOLOL 25 MG/1
25 TABLET ORAL NIGHTLY
Qty: 90 TABLET | Refills: 1 | Status: SHIPPED | OUTPATIENT
Start: 2020-07-29 | End: 2021-01-27 | Stop reason: SDUPTHER

## 2020-07-29 RX ORDER — OMEPRAZOLE 20 MG/1
20 CAPSULE, DELAYED RELEASE ORAL DAILY
Qty: 90 CAPSULE | Refills: 3 | Status: SHIPPED | OUTPATIENT
Start: 2020-07-29 | End: 2021-09-15 | Stop reason: SDUPTHER

## 2020-07-29 RX ORDER — ATORVASTATIN CALCIUM 20 MG/1
20 TABLET, FILM COATED ORAL DAILY
Qty: 90 TABLET | Refills: 1 | Status: SHIPPED | OUTPATIENT
Start: 2020-07-29 | End: 2021-09-02 | Stop reason: SDUPTHER

## 2020-07-29 RX ORDER — CLOPIDOGREL BISULFATE 75 MG/1
75 TABLET ORAL DAILY
Qty: 90 TABLET | Refills: 1 | Status: SHIPPED | OUTPATIENT
Start: 2020-07-29 | End: 2022-03-17 | Stop reason: SDUPTHER

## 2020-07-29 RX ORDER — CYCLOBENZAPRINE HCL 10 MG
10 TABLET ORAL 2 TIMES DAILY PRN
Qty: 60 TABLET | Refills: 2 | Status: SHIPPED | OUTPATIENT
Start: 2020-07-29 | End: 2022-09-23 | Stop reason: SDUPTHER

## 2020-07-29 NOTE — PROGRESS NOTES
SUBJECTIVE:    Patient ID: Mor Hill is a 74 y.o. male.    Chief Complaint: Follow-up (brought bottles ac )    This 74-year-old male comes in for his six-month office visit.  He has been isolating at home with his wife during the COVID virus crisis.  He stays up watching TV until 3:00 a.m. in the morning and then sleeps  till approximately 1:00 p.m. every day.  He has a swimming pool but never gets in it to swim.  He does not do any lawn work.  He is very sedentary.    He wears bilateral hearing aids.    Left total knee replacement per Dr. Linda, still hurts quite a bit.  He did have a prepatellar tendon rupture and had to have it repaired surgically.    Status post CABG where he had a left ventricular aneurysm repaired.      Orders Only on 07/29/2020   Component Date Value Ref Range Status    Cholesterol 07/29/2020 149  <200 mg/dL Final    HDL 07/29/2020 39* > OR = 40 mg/dL Final    Triglycerides 07/29/2020 119  <150 mg/dL Final    LDL Cholesterol 07/29/2020 88  mg/dL (calc) Final    Hdl/Cholesterol Ratio 07/29/2020 3.8  <5.0 (calc) Final    Non HDL Chol. (LDL+VLDL) 07/29/2020 110  <130 mg/dL (calc) Final    Glucose 07/29/2020 107* 65 - 99 mg/dL Final    BUN, Bld 07/29/2020 19  7 - 25 mg/dL Final    Creatinine 07/29/2020 1.11  0.70 - 1.18 mg/dL Final    eGFR if non African American 07/29/2020 66  > OR = 60 mL/min/1.73m2 Final    eGFR if African American 07/29/2020 76  > OR = 60 mL/min/1.73m2 Final    BUN/Creatinine Ratio 07/29/2020 NOT APPLICABLE  6 - 22 (calc) Final    Sodium 07/29/2020 140  135 - 146 mmol/L Final    Potassium 07/29/2020 4.2  3.5 - 5.3 mmol/L Final    Chloride 07/29/2020 103  98 - 110 mmol/L Final    CO2 07/29/2020 31  20 - 32 mmol/L Final    Calcium 07/29/2020 8.8  8.6 - 10.3 mg/dL Final    Total Protein 07/29/2020 6.8  6.1 - 8.1 g/dL Final    Albumin 07/29/2020 3.9  3.6 - 5.1 g/dL Final    Globulin, Total 07/29/2020 2.9  1.9 - 3.7 g/dL (calc) Final    Albumin/Globulin  Ratio 07/29/2020 1.3  1.0 - 2.5 (calc) Final    Total Bilirubin 07/29/2020 0.6  0.2 - 1.2 mg/dL Final    Alkaline Phosphatase 07/29/2020 45  35 - 144 U/L Final    AST 07/29/2020 16  10 - 35 U/L Final    ALT 07/29/2020 15  9 - 46 U/L Final    WBC 07/29/2020 6.0  3.8 - 10.8 Thousand/uL Final    RBC 07/29/2020 4.61  4.20 - 5.80 Million/uL Final    Hemoglobin 07/29/2020 14.4  13.2 - 17.1 g/dL Final    Hematocrit 07/29/2020 44.1  38.5 - 50.0 % Final    Mean Corpuscular Volume 07/29/2020 95.7  80.0 - 100.0 fL Final    Mean Corpuscular Hemoglobin 07/29/2020 31.2  27.0 - 33.0 pg Final    Mean Corpuscular Hemoglobin Conc 07/29/2020 32.7  32.0 - 36.0 g/dL Final    RDW 07/29/2020 12.7  11.0 - 15.0 % Final    Platelets 07/29/2020 255  140 - 400 Thousand/uL Final    MPV 07/29/2020 9.7  7.5 - 12.5 fL Final    Neutrophils Absolute 07/29/2020 3,894  1,500 - 7,800 cells/uL Final    Lymph # 07/29/2020 1,200  850 - 3,900 cells/uL Final    Mono # 07/29/2020 684  200 - 950 cells/uL Final    Eos # 07/29/2020 180  15 - 500 cells/uL Final    Baso # 07/29/2020 42  0 - 200 cells/uL Final    Neutrophils Relative 07/29/2020 64.9  % Final    Lymph% 07/29/2020 20.0  % Final    Mono% 07/29/2020 11.4  % Final    Eosinophil% 07/29/2020 3.0  % Final    Basophil% 07/29/2020 0.7  % Final    PROSTATE SPECIFIC ANTIGEN, SCR - Q* 07/29/2020 0.7  < OR = 4.0 ng/mL Final       Past Medical History:   Diagnosis Date    Anticoagulant long-term use     Arthritis     COPD (chronic obstructive pulmonary disease)     Coronary artery disease     Fx     RIBS    Hearing loss     LEFT EAR AID; RIGHT EAR OTC DEVICE    Heart attack     Hypertension     Thyroid disease     Vertigo      Past Surgical History:   Procedure Laterality Date    bilateral hammer toe      X 5    CARDIAC SURGERY      bypass X 3, ANEURYSM BY LEFT VENTRICLE    EYE SURGERY Bilateral     CATARACT    HERNIA REPAIR      ABD X 3    left RCR      LUNG REMOVAL,  PARTIAL Left     NASAL FRACTURE SURGERY      PATELLAR TENDON REPAIR Left 2/11/2019    Procedure: REPAIR, TENDON, PATELLAR;  Surgeon: Renny Linda MD;  Location: [facility];  Service: Orthopedics;  Laterality: Left;    ROTATOR CUFF REPAIR      left X 3     No family history on file.    Marital Status:   Alcohol History:  reports current alcohol use.  Tobacco History:  reports that he has quit smoking. He has never used smokeless tobacco.  Drug History:  reports no history of drug use.    Review of patient's allergies indicates:   Allergen Reactions    Iodinated contrast media     Iodine      Other reaction(s): in XRAY CONTRAST --swelling    Iodine and iodide containing products     Pentothal [thiopental sodium]      Trouble waking up, went into a deep sleep       Current Outpatient Medications:     atenoloL (TENORMIN) 25 MG tablet, Take 1 tablet (25 mg total) by mouth every evening., Disp: 90 tablet, Rfl: 1    atorvastatin (LIPITOR) 20 MG tablet, Take 1 tablet (20 mg total) by mouth once daily., Disp: 90 tablet, Rfl: 1    clopidogreL (PLAVIX) 75 mg tablet, Take 1 tablet (75 mg total) by mouth once daily., Disp: 90 tablet, Rfl: 1    cyclobenzaprine (FLEXERIL) 10 MG tablet, Take 1 tablet (10 mg total) by mouth 2 (two) times daily as needed., Disp: 60 tablet, Rfl: 2    levothyroxine (SYNTHROID) 50 MCG tablet, Take 1 tablet (50 mcg total) by mouth once daily., Disp: 90 tablet, Rfl: 1    meclizine (ANTIVERT) 25 mg tablet, Take 25 mg by mouth as needed. , Disp: , Rfl:     omeprazole (PRILOSEC) 20 MG capsule, Take 1 capsule (20 mg total) by mouth once daily., Disp: 90 capsule, Rfl: 3    aspirin 325 MG tablet, Take 1 tablet (325 mg total) by mouth 2 (two) times daily., Disp: , Rfl: 0    furosemide (LASIX) 20 MG tablet, Take 20 mg by mouth once daily., Disp: , Rfl:     ibuprofen (ADVIL,MOTRIN) 200 MG tablet, Take 2 tablets by mouth., Disp: , Rfl:     loratadine (CLARITIN) 10 mg tablet, Take 10 mg by  "mouth once daily., Disp: , Rfl:     phenylephrine HCl/acetaminophn (SINUS RELIEF, NON-DROWSY, ORAL), Take by mouth., Disp: , Rfl:     Review of Systems   Constitutional: Negative for appetite change, chills, fatigue, fever and unexpected weight change.   HENT: Negative for congestion, ear pain and trouble swallowing.    Eyes: Negative for pain, discharge and visual disturbance.   Respiratory: Negative for apnea, cough, shortness of breath and wheezing.    Cardiovascular: Negative for chest pain and leg swelling.   Gastrointestinal: Negative for abdominal pain, blood in stool, constipation, diarrhea, nausea and vomiting.   Endocrine: Negative for cold intolerance, heat intolerance and polydipsia.   Genitourinary: Negative for dysuria, hematuria, testicular pain and urgency.   Musculoskeletal: Positive for arthralgias (Left total knee replacement, left patella tendon repair.  Still has pain in that knee). Negative for gait problem, joint swelling and myalgias.   Neurological: Negative for dizziness, seizures and numbness.        Wobbly gait   Psychiatric/Behavioral: Positive for sleep disturbance. Negative for agitation, behavioral problems and hallucinations. The patient is not nervous/anxious.           Objective:      Vitals:    07/29/20 1111   BP: 126/70   Pulse: 72   Weight: 79.4 kg (175 lb)   Height: 5' 5" (1.651 m)     Body mass index is 29.12 kg/m².  Physical Exam  Vitals signs and nursing note reviewed.   Constitutional:       Appearance: He is well-developed.   HENT:      Head: Normocephalic and atraumatic.      Right Ear: External ear normal.      Left Ear: External ear normal.      Nose: Nose normal.   Eyes:      Pupils: Pupils are equal, round, and reactive to light.   Neck:      Musculoskeletal: Normal range of motion and neck supple.      Thyroid: No thyromegaly.      Vascular: No carotid bruit.   Cardiovascular:      Rate and Rhythm: Normal rate and regular rhythm.      Heart sounds: Normal heart " sounds. No murmur.   Pulmonary:      Effort: Pulmonary effort is normal.      Breath sounds: Normal breath sounds. No wheezing or rales.   Abdominal:      General: Bowel sounds are normal. There is no distension.      Palpations: Abdomen is soft.      Tenderness: There is no abdominal tenderness.   Musculoskeletal: Normal range of motion.         General: No tenderness or deformity.      Lumbar back: Normal. He exhibits no pain and no spasm.      Comments: Bends 90 degrees at  waist shoulders have good range of motion, knees are quite crepitant left total knee replacement does have good flexion and extension.  No pitting edema to the lower legs   Lymphadenopathy:      Cervical: No cervical adenopathy.   Skin:     General: Skin is warm and dry.      Findings: No rash.   Neurological:      Mental Status: He is alert and oriented to person, place, and time.      Cranial Nerves: No cranial nerve deficit.      Coordination: Coordination normal.      Comments: He has muscle atrophy to his thighs.  He has a slow wobbly gait.   Psychiatric:         Behavior: Behavior normal.         Thought Content: Thought content normal.         Judgment: Judgment normal.           Assessment:       1. Coronary artery disease involving native heart without angina pectoris, unspecified vessel or lesion type    2. Thyroid disease    3. Post-traumatic osteoarthritis of left knee    4. Hypertension, unspecified type    5. Mixed hyperlipidemia    6. Gastroesophageal reflux disease without esophagitis    7. Special screening for malignant neoplasm of prostate         Plan:       Coronary artery disease involving native heart without angina pectoris, unspecified vessel or lesion type  -     clopidogreL (PLAVIX) 75 mg tablet; Take 1 tablet (75 mg total) by mouth once daily.  Dispense: 90 tablet; Refill: 1  Angina is stable.  Continue Plavix 75 mg daily  Thyroid disease  -     levothyroxine (SYNTHROID) 50 MCG tablet; Take 1 tablet (50 mcg total)  by mouth once daily.  Dispense: 90 tablet; Refill: 1  He is due for labs now  Post-traumatic osteoarthritis of left knee  -     cyclobenzaprine (FLEXERIL) 10 MG tablet; Take 1 tablet (10 mg total) by mouth 2 (two) times daily as needed.  Dispense: 60 tablet; Refill: 2  Refill Flexeril  Hypertension, unspecified type  -     atenoloL (TENORMIN) 25 MG tablet; Take 1 tablet (25 mg total) by mouth every evening.  Dispense: 90 tablet; Refill: 1  -     CBC auto differential; Future; Expected date: 07/29/2020  -     Comprehensive metabolic panel; Future; Expected date: 07/29/2020  Blood pressure well controlled at this time  Mixed hyperlipidemia  -     atorvastatin (LIPITOR) 20 MG tablet; Take 1 tablet (20 mg total) by mouth once daily.  Dispense: 90 tablet; Refill: 1  -     Lipid Panel; Future; Expected date: 07/29/2020  Lipid panel due now  Gastroesophageal reflux disease without esophagitis  -     omeprazole (PRILOSEC) 20 MG capsule; Take 1 capsule (20 mg total) by mouth once daily.  Dispense: 90 capsule; Refill: 3    Special screening for malignant neoplasm of prostate  -     PSA, Screening; Future; Expected date: 07/29/2020      Follow up in about 6 months (around 1/29/2021), or np  for  HLD.

## 2020-07-30 LAB
ALBUMIN SERPL-MCNC: 3.9 G/DL (ref 3.6–5.1)
ALBUMIN/GLOB SERPL: 1.3 (CALC) (ref 1–2.5)
ALP SERPL-CCNC: 45 U/L (ref 35–144)
ALT SERPL-CCNC: 15 U/L (ref 9–46)
AST SERPL-CCNC: 16 U/L (ref 10–35)
BASOPHILS # BLD AUTO: 42 CELLS/UL (ref 0–200)
BASOPHILS NFR BLD AUTO: 0.7 %
BILIRUB SERPL-MCNC: 0.6 MG/DL (ref 0.2–1.2)
BUN SERPL-MCNC: 19 MG/DL (ref 7–25)
BUN/CREAT SERPL: ABNORMAL (CALC) (ref 6–22)
CALCIUM SERPL-MCNC: 8.8 MG/DL (ref 8.6–10.3)
CHLORIDE SERPL-SCNC: 103 MMOL/L (ref 98–110)
CHOLEST SERPL-MCNC: 149 MG/DL
CHOLEST/HDLC SERPL: 3.8 (CALC)
CO2 SERPL-SCNC: 31 MMOL/L (ref 20–32)
CREAT SERPL-MCNC: 1.11 MG/DL (ref 0.7–1.18)
EOSINOPHIL # BLD AUTO: 180 CELLS/UL (ref 15–500)
EOSINOPHIL NFR BLD AUTO: 3 %
ERYTHROCYTE [DISTWIDTH] IN BLOOD BY AUTOMATED COUNT: 12.7 % (ref 11–15)
GFRSERPLBLD MDRD-ARVRAT: 66 ML/MIN/1.73M2
GLOBULIN SER CALC-MCNC: 2.9 G/DL (CALC) (ref 1.9–3.7)
GLUCOSE SERPL-MCNC: 107 MG/DL (ref 65–99)
HCT VFR BLD AUTO: 44.1 % (ref 38.5–50)
HDLC SERPL-MCNC: 39 MG/DL
HGB BLD-MCNC: 14.4 G/DL (ref 13.2–17.1)
LDLC SERPL CALC-MCNC: 88 MG/DL (CALC)
LYMPHOCYTES # BLD AUTO: 1200 CELLS/UL (ref 850–3900)
LYMPHOCYTES NFR BLD AUTO: 20 %
MCH RBC QN AUTO: 31.2 PG (ref 27–33)
MCHC RBC AUTO-ENTMCNC: 32.7 G/DL (ref 32–36)
MCV RBC AUTO: 95.7 FL (ref 80–100)
MONOCYTES # BLD AUTO: 684 CELLS/UL (ref 200–950)
MONOCYTES NFR BLD AUTO: 11.4 %
NEUTROPHILS # BLD AUTO: 3894 CELLS/UL (ref 1500–7800)
NEUTROPHILS NFR BLD AUTO: 64.9 %
NONHDLC SERPL-MCNC: 110 MG/DL (CALC)
PLATELET # BLD AUTO: 255 THOUSAND/UL (ref 140–400)
PMV BLD REES-ECKER: 9.7 FL (ref 7.5–12.5)
POTASSIUM SERPL-SCNC: 4.2 MMOL/L (ref 3.5–5.3)
PROT SERPL-MCNC: 6.8 G/DL (ref 6.1–8.1)
PSA SERPL-MCNC: 0.7 NG/ML
RBC # BLD AUTO: 4.61 MILLION/UL (ref 4.2–5.8)
SODIUM SERPL-SCNC: 140 MMOL/L (ref 135–146)
TRIGL SERPL-MCNC: 119 MG/DL
WBC # BLD AUTO: 6 THOUSAND/UL (ref 3.8–10.8)

## 2020-08-03 ENCOUNTER — TELEPHONE (OUTPATIENT)
Dept: FAMILY MEDICINE | Facility: CLINIC | Age: 75
End: 2020-08-03

## 2020-08-03 NOTE — TELEPHONE ENCOUNTER
----- Message from Ayden Carter MD sent at 8/2/2020 11:31 AM CDT -----  Call patient.  Cholesterol looks excellent at 149.  CBC shows no anemia.  Sugar, kidneys, and liver all within normal limits.  Prostate normal with a PSA of 0.7.  Continue current medications.  RTC 6 months with CMP and lipids

## 2020-08-04 ENCOUNTER — TELEPHONE (OUTPATIENT)
Dept: FAMILY MEDICINE | Facility: CLINIC | Age: 75
End: 2020-08-04

## 2020-08-04 NOTE — PROGRESS NOTES
Spoke to patient with results verbatim per Dr Carter. Verbalized understanding on all. Remind me for lab.

## 2020-08-04 NOTE — TELEPHONE ENCOUNTER
----- Message from [facility], RT sent at 8/4/2020  9:35 AM CDT -----  Patient said Dr Carter sent Prilosec to pharmacy. Wants to know if this is the medication that has been advertised to not take because it causes cancer. Pt # [phone]

## 2020-12-22 ENCOUNTER — TELEPHONE (OUTPATIENT)
Dept: FAMILY MEDICINE | Facility: CLINIC | Age: 75
End: 2020-12-22

## 2020-12-22 NOTE — TELEPHONE ENCOUNTER
----- Message from Berta Bland sent at 12/22/2020  4:13 PM CST -----  Regarding: Needs call back from nurse  Contact: Mor Hill  Pt is calling he would really like to know if there will be a priority lis for him and his wife to get on in regards to the Covid vaccine . I explained to him that I don't know too much about what's going on with the vaccine or if we will have it and im not too sure of a priority list. He would like for the nurse or manager to give him a call back please.   Pt# [phone]

## 2021-01-13 ENCOUNTER — TELEPHONE (OUTPATIENT)
Dept: FAMILY MEDICINE | Facility: CLINIC | Age: 76
End: 2021-01-13

## 2021-01-13 DIAGNOSIS — Z00.00 ROUTINE GENERAL MEDICAL EXAMINATION AT A HEALTH CARE FACILITY: Primary | ICD-10-CM

## 2021-01-13 DIAGNOSIS — E78.2 MIXED HYPERLIPIDEMIA: ICD-10-CM

## 2021-01-13 DIAGNOSIS — Z79.899 ENCOUNTER FOR LONG-TERM (CURRENT) USE OF OTHER MEDICATIONS: ICD-10-CM

## 2021-01-13 DIAGNOSIS — I10 HYPERTENSION, UNSPECIFIED TYPE: ICD-10-CM

## 2021-01-13 DIAGNOSIS — I25.10 CORONARY ARTERY DISEASE INVOLVING NATIVE HEART WITHOUT ANGINA PECTORIS, UNSPECIFIED VESSEL OR LESION TYPE: ICD-10-CM

## 2021-01-26 LAB
ALBUMIN SERPL-MCNC: 4.2 G/DL (ref 3.6–5.1)
ALBUMIN/CREAT UR: 7 MCG/MG CREAT
ALBUMIN/GLOB SERPL: 1.4 (CALC) (ref 1–2.5)
ALP SERPL-CCNC: 39 U/L (ref 35–144)
ALT SERPL-CCNC: 22 U/L (ref 9–46)
APPEARANCE UR: CLEAR
AST SERPL-CCNC: 21 U/L (ref 10–35)
BACTERIA #/AREA URNS HPF: NORMAL /HPF
BACTERIA UR CULT: NORMAL
BASOPHILS # BLD AUTO: 37 CELLS/UL (ref 0–200)
BASOPHILS NFR BLD AUTO: 0.5 %
BILIRUB SERPL-MCNC: 0.5 MG/DL (ref 0.2–1.2)
BILIRUB UR QL STRIP: NEGATIVE
BUN SERPL-MCNC: 26 MG/DL (ref 7–25)
BUN/CREAT SERPL: 20 (CALC) (ref 6–22)
CALCIUM SERPL-MCNC: 9.2 MG/DL (ref 8.6–10.3)
CHLORIDE SERPL-SCNC: 102 MMOL/L (ref 98–110)
CHOLEST SERPL-MCNC: 190 MG/DL
CHOLEST/HDLC SERPL: 5 (CALC)
CO2 SERPL-SCNC: 29 MMOL/L (ref 20–32)
COLOR UR: YELLOW
CREAT SERPL-MCNC: 1.27 MG/DL (ref 0.7–1.18)
CREAT UR-MCNC: 128 MG/DL (ref 20–320)
EOSINOPHIL # BLD AUTO: 229 CELLS/UL (ref 15–500)
EOSINOPHIL NFR BLD AUTO: 3.1 %
ERYTHROCYTE [DISTWIDTH] IN BLOOD BY AUTOMATED COUNT: 12.3 % (ref 11–15)
GFRSERPLBLD MDRD-ARVRAT: 55 ML/MIN/1.73M2
GLOBULIN SER CALC-MCNC: 2.9 G/DL (CALC) (ref 1.9–3.7)
GLUCOSE SERPL-MCNC: 105 MG/DL (ref 65–99)
GLUCOSE UR QL STRIP: NEGATIVE
HCT VFR BLD AUTO: 44.9 % (ref 38.5–50)
HDLC SERPL-MCNC: 38 MG/DL
HGB BLD-MCNC: 14.6 G/DL (ref 13.2–17.1)
HGB UR QL STRIP: NEGATIVE
HYALINE CASTS #/AREA URNS LPF: NORMAL /LPF
KETONES UR QL STRIP: NEGATIVE
LDLC SERPL CALC-MCNC: 126 MG/DL (CALC)
LEUKOCYTE ESTERASE UR QL STRIP: NEGATIVE
LYMPHOCYTES # BLD AUTO: 1672 CELLS/UL (ref 850–3900)
LYMPHOCYTES NFR BLD AUTO: 22.6 %
MCH RBC QN AUTO: 31.2 PG (ref 27–33)
MCHC RBC AUTO-ENTMCNC: 32.5 G/DL (ref 32–36)
MCV RBC AUTO: 95.9 FL (ref 80–100)
MICROALBUMIN UR-MCNC: 0.9 MG/DL
MONOCYTES # BLD AUTO: 925 CELLS/UL (ref 200–950)
MONOCYTES NFR BLD AUTO: 12.5 %
NEUTROPHILS # BLD AUTO: 4536 CELLS/UL (ref 1500–7800)
NEUTROPHILS NFR BLD AUTO: 61.3 %
NITRITE UR QL STRIP: NEGATIVE
NONHDLC SERPL-MCNC: 152 MG/DL (CALC)
PH UR STRIP: 5.5 [PH] (ref 5–8)
PLATELET # BLD AUTO: 258 THOUSAND/UL (ref 140–400)
PMV BLD REES-ECKER: 10.2 FL (ref 7.5–12.5)
POTASSIUM SERPL-SCNC: 4.1 MMOL/L (ref 3.5–5.3)
PROT SERPL-MCNC: 7.1 G/DL (ref 6.1–8.1)
PROT UR QL STRIP: NEGATIVE
RBC # BLD AUTO: 4.68 MILLION/UL (ref 4.2–5.8)
RBC #/AREA URNS HPF: NORMAL /HPF
SODIUM SERPL-SCNC: 140 MMOL/L (ref 135–146)
SP GR UR STRIP: 1.02 (ref 1–1.03)
SQUAMOUS #/AREA URNS HPF: NORMAL /HPF
TRIGL SERPL-MCNC: 145 MG/DL
TSH SERPL-ACNC: 9.17 MIU/L (ref 0.4–4.5)
WBC # BLD AUTO: 7.4 THOUSAND/UL (ref 3.8–10.8)
WBC #/AREA URNS HPF: NORMAL /HPF

## 2021-01-27 ENCOUNTER — OFFICE VISIT (OUTPATIENT)
Dept: FAMILY MEDICINE | Facility: CLINIC | Age: 76
End: 2021-01-27
Payer: MEDICARE

## 2021-01-27 VITALS
HEIGHT: 65 IN | BODY MASS INDEX: 30.16 KG/M2 | HEART RATE: 84 BPM | WEIGHT: 181 LBS | DIASTOLIC BLOOD PRESSURE: 78 MMHG | SYSTOLIC BLOOD PRESSURE: 120 MMHG

## 2021-01-27 DIAGNOSIS — Z95.1 HISTORY OF CORONARY ARTERY BYPASS GRAFT: ICD-10-CM

## 2021-01-27 DIAGNOSIS — R42 VERTIGO: ICD-10-CM

## 2021-01-27 DIAGNOSIS — M25.512 CHRONIC LEFT SHOULDER PAIN: ICD-10-CM

## 2021-01-27 DIAGNOSIS — I25.10 CORONARY ARTERY DISEASE INVOLVING NATIVE HEART WITHOUT ANGINA PECTORIS, UNSPECIFIED VESSEL OR LESION TYPE: ICD-10-CM

## 2021-01-27 DIAGNOSIS — M17.32 POST-TRAUMATIC OSTEOARTHRITIS OF LEFT KNEE: ICD-10-CM

## 2021-01-27 DIAGNOSIS — G62.9 NEUROPATHY: ICD-10-CM

## 2021-01-27 DIAGNOSIS — Z23 NEED FOR VACCINATION: ICD-10-CM

## 2021-01-27 DIAGNOSIS — H90.0 CONDUCTIVE HEARING LOSS, BILATERAL: ICD-10-CM

## 2021-01-27 DIAGNOSIS — I10 HYPERTENSION, UNSPECIFIED TYPE: ICD-10-CM

## 2021-01-27 DIAGNOSIS — G89.29 CHRONIC LEFT SHOULDER PAIN: ICD-10-CM

## 2021-01-27 DIAGNOSIS — R41.3 MEMORY LOSS: ICD-10-CM

## 2021-01-27 DIAGNOSIS — E03.9 ACQUIRED HYPOTHYROIDISM: Primary | ICD-10-CM

## 2021-01-27 PROCEDURE — 3008F BODY MASS INDEX DOCD: CPT | Mod: S$GLB,,, | Performed by: FAMILY MEDICINE

## 2021-01-27 PROCEDURE — 3008F PR BODY MASS INDEX (BMI) DOCUMENTED: ICD-10-PCS | Mod: S$GLB,,, | Performed by: FAMILY MEDICINE

## 2021-01-27 PROCEDURE — 3078F PR MOST RECENT DIASTOLIC BLOOD PRESSURE < 80 MM HG: ICD-10-PCS | Mod: S$GLB,,, | Performed by: FAMILY MEDICINE

## 2021-01-27 PROCEDURE — G0009 PNEUMOCOCCAL POLYSACCHARIDE VACCINE 23-VALENT =>2YO SQ IM: ICD-10-PCS | Mod: S$GLB,,, | Performed by: FAMILY MEDICINE

## 2021-01-27 PROCEDURE — 90662 IIV NO PRSV INCREASED AG IM: CPT | Mod: S$GLB,,, | Performed by: FAMILY MEDICINE

## 2021-01-27 PROCEDURE — 99214 OFFICE O/P EST MOD 30 MIN: CPT | Mod: 25,S$GLB,, | Performed by: FAMILY MEDICINE

## 2021-01-27 PROCEDURE — 3078F DIAST BP <80 MM HG: CPT | Mod: S$GLB,,, | Performed by: FAMILY MEDICINE

## 2021-01-27 PROCEDURE — 3074F SYST BP LT 130 MM HG: CPT | Mod: S$GLB,,, | Performed by: FAMILY MEDICINE

## 2021-01-27 PROCEDURE — 1159F PR MEDICATION LIST DOCUMENTED IN MEDICAL RECORD: ICD-10-PCS | Mod: S$GLB,,, | Performed by: FAMILY MEDICINE

## 2021-01-27 PROCEDURE — 3074F PR MOST RECENT SYSTOLIC BLOOD PRESSURE < 130 MM HG: ICD-10-PCS | Mod: S$GLB,,, | Performed by: FAMILY MEDICINE

## 2021-01-27 PROCEDURE — G0009 ADMIN PNEUMOCOCCAL VACCINE: HCPCS | Mod: S$GLB,,, | Performed by: FAMILY MEDICINE

## 2021-01-27 PROCEDURE — G0008 FLU VACCINE - QUADRIVALENT - HIGH DOSE (65+) PRESERVATIVE FREE IM: ICD-10-PCS | Mod: S$GLB,,, | Performed by: FAMILY MEDICINE

## 2021-01-27 PROCEDURE — G0008 ADMIN INFLUENZA VIRUS VAC: HCPCS | Mod: S$GLB,,, | Performed by: FAMILY MEDICINE

## 2021-01-27 PROCEDURE — 1159F MED LIST DOCD IN RCRD: CPT | Mod: S$GLB,,, | Performed by: FAMILY MEDICINE

## 2021-01-27 PROCEDURE — 90732 PNEUMOCOCCAL POLYSACCHARIDE VACCINE 23-VALENT =>2YO SQ IM: ICD-10-PCS | Mod: S$GLB,,, | Performed by: FAMILY MEDICINE

## 2021-01-27 PROCEDURE — 90662 FLU VACCINE - QUADRIVALENT - HIGH DOSE (65+) PRESERVATIVE FREE IM: ICD-10-PCS | Mod: S$GLB,,, | Performed by: FAMILY MEDICINE

## 2021-01-27 PROCEDURE — 90732 PPSV23 VACC 2 YRS+ SUBQ/IM: CPT | Mod: S$GLB,,, | Performed by: FAMILY MEDICINE

## 2021-01-27 PROCEDURE — 99214 PR OFFICE/OUTPT VISIT, EST, LEVL IV, 30-39 MIN: ICD-10-PCS | Mod: 25,S$GLB,, | Performed by: FAMILY MEDICINE

## 2021-01-27 RX ORDER — MECLIZINE HYDROCHLORIDE 25 MG/1
25 TABLET ORAL
Qty: 90 TABLET | Refills: 1 | Status: SHIPPED | OUTPATIENT
Start: 2021-01-27 | End: 2021-09-15 | Stop reason: SDUPTHER

## 2021-01-27 RX ORDER — ATENOLOL 25 MG/1
25 TABLET ORAL NIGHTLY
Qty: 90 TABLET | Refills: 1 | Status: SHIPPED | OUTPATIENT
Start: 2021-01-27 | End: 2021-09-15 | Stop reason: SDUPTHER

## 2021-01-27 RX ORDER — LEVOTHYROXINE SODIUM 100 UG/1
100 TABLET ORAL
Qty: 90 TABLET | Refills: 1 | Status: SHIPPED | OUTPATIENT
Start: 2021-01-27 | End: 2021-08-10 | Stop reason: SDUPTHER

## 2021-02-18 ENCOUNTER — IMMUNIZATION (OUTPATIENT)
Dept: PRIMARY CARE CLINIC | Facility: CLINIC | Age: 76
End: 2021-02-18
Payer: MEDICARE

## 2021-02-18 DIAGNOSIS — Z23 NEED FOR VACCINATION: Primary | ICD-10-CM

## 2021-02-18 PROCEDURE — 91300 COVID-19, MRNA, LNP-S, PF, 30 MCG/0.3 ML DOSE VACCINE: ICD-10-PCS | Mod: S$GLB,,, | Performed by: FAMILY MEDICINE

## 2021-02-18 PROCEDURE — 91300 COVID-19, MRNA, LNP-S, PF, 30 MCG/0.3 ML DOSE VACCINE: CPT | Mod: S$GLB,,, | Performed by: FAMILY MEDICINE

## 2021-02-18 PROCEDURE — 0001A COVID-19, MRNA, LNP-S, PF, 30 MCG/0.3 ML DOSE VACCINE: ICD-10-PCS | Mod: CV19,S$GLB,, | Performed by: FAMILY MEDICINE

## 2021-02-18 PROCEDURE — 0001A COVID-19, MRNA, LNP-S, PF, 30 MCG/0.3 ML DOSE VACCINE: CPT | Mod: CV19,S$GLB,, | Performed by: FAMILY MEDICINE

## 2021-03-11 ENCOUNTER — IMMUNIZATION (OUTPATIENT)
Dept: PRIMARY CARE CLINIC | Facility: CLINIC | Age: 76
End: 2021-03-11

## 2021-03-11 DIAGNOSIS — Z23 NEED FOR VACCINATION: Primary | ICD-10-CM

## 2021-03-11 PROCEDURE — 0002A COVID-19, MRNA, LNP-S, PF, 30 MCG/0.3 ML DOSE VACCINE: ICD-10-PCS | Mod: CV19,S$GLB,, | Performed by: FAMILY MEDICINE

## 2021-03-11 PROCEDURE — 0002A COVID-19, MRNA, LNP-S, PF, 30 MCG/0.3 ML DOSE VACCINE: CPT | Mod: CV19,S$GLB,, | Performed by: FAMILY MEDICINE

## 2021-03-11 PROCEDURE — 91300 COVID-19, MRNA, LNP-S, PF, 30 MCG/0.3 ML DOSE VACCINE: ICD-10-PCS | Mod: S$GLB,,, | Performed by: FAMILY MEDICINE

## 2021-03-11 PROCEDURE — 91300 COVID-19, MRNA, LNP-S, PF, 30 MCG/0.3 ML DOSE VACCINE: CPT | Mod: S$GLB,,, | Performed by: FAMILY MEDICINE

## 2021-03-31 ENCOUNTER — TELEPHONE (OUTPATIENT)
Dept: FAMILY MEDICINE | Facility: CLINIC | Age: 76
End: 2021-03-31

## 2021-07-21 ENCOUNTER — TELEPHONE (OUTPATIENT)
Dept: FAMILY MEDICINE | Facility: CLINIC | Age: 76
End: 2021-07-21

## 2021-08-09 ENCOUNTER — TELEPHONE (OUTPATIENT)
Dept: FAMILY MEDICINE | Facility: CLINIC | Age: 76
End: 2021-08-09

## 2021-08-09 DIAGNOSIS — Z79.899 ENCOUNTER FOR LONG-TERM (CURRENT) USE OF OTHER MEDICATIONS: Primary | ICD-10-CM

## 2021-08-10 ENCOUNTER — TELEPHONE (OUTPATIENT)
Dept: FAMILY MEDICINE | Facility: CLINIC | Age: 76
End: 2021-08-10

## 2021-08-10 DIAGNOSIS — E03.9 ACQUIRED HYPOTHYROIDISM: ICD-10-CM

## 2021-08-10 RX ORDER — LEVOTHYROXINE SODIUM 100 UG/1
100 TABLET ORAL
Qty: 90 TABLET | Refills: 1 | Status: SHIPPED | OUTPATIENT
Start: 2021-08-10 | End: 2021-09-15 | Stop reason: SDUPTHER

## 2021-08-19 ENCOUNTER — IMMUNIZATION (OUTPATIENT)
Dept: PRIMARY CARE CLINIC | Facility: CLINIC | Age: 76
End: 2021-08-19
Payer: MEDICARE

## 2021-08-19 DIAGNOSIS — Z23 NEED FOR VACCINATION: Primary | ICD-10-CM

## 2021-08-19 PROCEDURE — 91300 COVID-19, MRNA, LNP-S, PF, 30 MCG/0.3 ML DOSE VACCINE: ICD-10-PCS | Mod: S$GLB,,, | Performed by: FAMILY MEDICINE

## 2021-08-19 PROCEDURE — 0003A COVID-19, MRNA, LNP-S, PF, 30 MCG/0.3 ML DOSE VACCINE: ICD-10-PCS | Mod: CV19,S$GLB,, | Performed by: FAMILY MEDICINE

## 2021-08-19 PROCEDURE — 0003A COVID-19, MRNA, LNP-S, PF, 30 MCG/0.3 ML DOSE VACCINE: CPT | Mod: CV19,S$GLB,, | Performed by: FAMILY MEDICINE

## 2021-08-19 PROCEDURE — 91300 COVID-19, MRNA, LNP-S, PF, 30 MCG/0.3 ML DOSE VACCINE: CPT | Mod: S$GLB,,, | Performed by: FAMILY MEDICINE

## 2021-09-02 DIAGNOSIS — E78.2 MIXED HYPERLIPIDEMIA: ICD-10-CM

## 2021-09-02 RX ORDER — ATORVASTATIN CALCIUM 20 MG/1
20 TABLET, FILM COATED ORAL DAILY
Qty: 90 TABLET | Refills: 1 | Status: SHIPPED | OUTPATIENT
Start: 2021-09-02 | End: 2022-09-23 | Stop reason: SDUPTHER

## 2021-09-14 ENCOUNTER — TELEPHONE (OUTPATIENT)
Dept: FAMILY MEDICINE | Facility: CLINIC | Age: 76
End: 2021-09-14

## 2021-09-14 LAB
ALBUMIN SERPL-MCNC: 4 G/DL (ref 3.6–5.1)
ALBUMIN/GLOB SERPL: 1.4 (CALC) (ref 1–2.5)
ALP SERPL-CCNC: 33 U/L (ref 35–144)
ALT SERPL-CCNC: 18 U/L (ref 9–46)
AST SERPL-CCNC: 21 U/L (ref 10–35)
BILIRUB SERPL-MCNC: 0.7 MG/DL (ref 0.2–1.2)
BUN SERPL-MCNC: 23 MG/DL (ref 7–25)
BUN/CREAT SERPL: 18 (CALC) (ref 6–22)
CALCIUM SERPL-MCNC: 9.3 MG/DL (ref 8.6–10.3)
CHLORIDE SERPL-SCNC: 101 MMOL/L (ref 98–110)
CHOLEST SERPL-MCNC: 216 MG/DL
CHOLEST/HDLC SERPL: 4.7 (CALC)
CO2 SERPL-SCNC: 28 MMOL/L (ref 20–32)
CREAT SERPL-MCNC: 1.25 MG/DL (ref 0.7–1.18)
GLOBULIN SER CALC-MCNC: 2.9 G/DL (CALC) (ref 1.9–3.7)
GLUCOSE SERPL-MCNC: 93 MG/DL (ref 65–99)
HDLC SERPL-MCNC: 46 MG/DL
LDLC SERPL CALC-MCNC: 147 MG/DL (CALC)
NONHDLC SERPL-MCNC: 170 MG/DL (CALC)
POTASSIUM SERPL-SCNC: 4.4 MMOL/L (ref 3.5–5.3)
PROT SERPL-MCNC: 6.9 G/DL (ref 6.1–8.1)
SODIUM SERPL-SCNC: 137 MMOL/L (ref 135–146)
TRIGL SERPL-MCNC: 115 MG/DL

## 2021-09-15 ENCOUNTER — OFFICE VISIT (OUTPATIENT)
Dept: FAMILY MEDICINE | Facility: CLINIC | Age: 76
End: 2021-09-15
Payer: MEDICARE

## 2021-09-15 VITALS
WEIGHT: 176 LBS | HEART RATE: 68 BPM | BODY MASS INDEX: 29.32 KG/M2 | DIASTOLIC BLOOD PRESSURE: 66 MMHG | TEMPERATURE: 98 F | HEIGHT: 65 IN | SYSTOLIC BLOOD PRESSURE: 120 MMHG

## 2021-09-15 DIAGNOSIS — R42 VERTIGO: ICD-10-CM

## 2021-09-15 DIAGNOSIS — E03.9 ACQUIRED HYPOTHYROIDISM: ICD-10-CM

## 2021-09-15 DIAGNOSIS — Z63.0 MARITAL PROBLEMS: ICD-10-CM

## 2021-09-15 DIAGNOSIS — K21.9 GASTROESOPHAGEAL REFLUX DISEASE WITHOUT ESOPHAGITIS: ICD-10-CM

## 2021-09-15 DIAGNOSIS — I10 HYPERTENSION, UNSPECIFIED TYPE: ICD-10-CM

## 2021-09-15 DIAGNOSIS — Z12.5 SCREENING FOR PROSTATE CANCER: ICD-10-CM

## 2021-09-15 DIAGNOSIS — Z23 NEED FOR INFLUENZA VACCINATION: ICD-10-CM

## 2021-09-15 DIAGNOSIS — Z12.11 SCREEN FOR COLON CANCER: ICD-10-CM

## 2021-09-15 DIAGNOSIS — E78.2 MIXED HYPERLIPIDEMIA: Primary | ICD-10-CM

## 2021-09-15 PROCEDURE — 3066F NEPHROPATHY DOC TX: CPT | Mod: S$GLB,,, | Performed by: FAMILY MEDICINE

## 2021-09-15 PROCEDURE — G0008 FLU VACCINE - QUADRIVALENT - HIGH DOSE (65+) PRESERVATIVE FREE IM: ICD-10-PCS | Mod: S$GLB,,, | Performed by: FAMILY MEDICINE

## 2021-09-15 PROCEDURE — 1159F PR MEDICATION LIST DOCUMENTED IN MEDICAL RECORD: ICD-10-PCS | Mod: S$GLB,,, | Performed by: FAMILY MEDICINE

## 2021-09-15 PROCEDURE — 3008F PR BODY MASS INDEX (BMI) DOCUMENTED: ICD-10-PCS | Mod: S$GLB,,, | Performed by: FAMILY MEDICINE

## 2021-09-15 PROCEDURE — 99214 PR OFFICE/OUTPT VISIT, EST, LEVL IV, 30-39 MIN: ICD-10-PCS | Mod: 25,S$GLB,, | Performed by: FAMILY MEDICINE

## 2021-09-15 PROCEDURE — 3061F PR NEG MICROALBUMINURIA RESULT DOCUMENTED/REVIEW: ICD-10-PCS | Mod: S$GLB,,, | Performed by: FAMILY MEDICINE

## 2021-09-15 PROCEDURE — 3074F SYST BP LT 130 MM HG: CPT | Mod: S$GLB,,, | Performed by: FAMILY MEDICINE

## 2021-09-15 PROCEDURE — 3078F DIAST BP <80 MM HG: CPT | Mod: S$GLB,,, | Performed by: FAMILY MEDICINE

## 2021-09-15 PROCEDURE — 3078F PR MOST RECENT DIASTOLIC BLOOD PRESSURE < 80 MM HG: ICD-10-PCS | Mod: S$GLB,,, | Performed by: FAMILY MEDICINE

## 2021-09-15 PROCEDURE — G0008 ADMIN INFLUENZA VIRUS VAC: HCPCS | Mod: S$GLB,,, | Performed by: FAMILY MEDICINE

## 2021-09-15 PROCEDURE — 90662 IIV NO PRSV INCREASED AG IM: CPT | Mod: S$GLB,,, | Performed by: FAMILY MEDICINE

## 2021-09-15 PROCEDURE — 3066F PR DOCUMENTATION OF TREATMENT FOR NEPHROPATHY: ICD-10-PCS | Mod: S$GLB,,, | Performed by: FAMILY MEDICINE

## 2021-09-15 PROCEDURE — 3061F NEG MICROALBUMINURIA REV: CPT | Mod: S$GLB,,, | Performed by: FAMILY MEDICINE

## 2021-09-15 PROCEDURE — 90662 FLU VACCINE - QUADRIVALENT - HIGH DOSE (65+) PRESERVATIVE FREE IM: ICD-10-PCS | Mod: S$GLB,,, | Performed by: FAMILY MEDICINE

## 2021-09-15 PROCEDURE — 1159F MED LIST DOCD IN RCRD: CPT | Mod: S$GLB,,, | Performed by: FAMILY MEDICINE

## 2021-09-15 PROCEDURE — 3008F BODY MASS INDEX DOCD: CPT | Mod: S$GLB,,, | Performed by: FAMILY MEDICINE

## 2021-09-15 PROCEDURE — 3074F PR MOST RECENT SYSTOLIC BLOOD PRESSURE < 130 MM HG: ICD-10-PCS | Mod: S$GLB,,, | Performed by: FAMILY MEDICINE

## 2021-09-15 PROCEDURE — 99214 OFFICE O/P EST MOD 30 MIN: CPT | Mod: 25,S$GLB,, | Performed by: FAMILY MEDICINE

## 2021-09-15 RX ORDER — MECLIZINE HYDROCHLORIDE 25 MG/1
25 TABLET ORAL
Qty: 90 TABLET | Refills: 1 | Status: SHIPPED | OUTPATIENT
Start: 2021-09-15 | End: 2022-03-17 | Stop reason: SDUPTHER

## 2021-09-15 RX ORDER — OMEPRAZOLE 20 MG/1
20 CAPSULE, DELAYED RELEASE ORAL DAILY
Qty: 90 CAPSULE | Refills: 3 | Status: SHIPPED | OUTPATIENT
Start: 2021-09-15 | End: 2022-09-28 | Stop reason: SDUPTHER

## 2021-09-15 RX ORDER — ATENOLOL 25 MG/1
25 TABLET ORAL NIGHTLY
Qty: 90 TABLET | Refills: 1 | Status: SHIPPED | OUTPATIENT
Start: 2021-09-15 | End: 2022-03-17 | Stop reason: SDUPTHER

## 2021-09-15 RX ORDER — ATORVASTATIN CALCIUM 40 MG/1
40 TABLET, FILM COATED ORAL DAILY
Qty: 90 TABLET | Refills: 1 | Status: SHIPPED | OUTPATIENT
Start: 2021-09-15 | End: 2022-09-15

## 2021-09-15 RX ORDER — ATORVASTATIN CALCIUM 20 MG/1
20 TABLET, FILM COATED ORAL DAILY
Qty: 90 TABLET | Refills: 1 | Status: CANCELLED | OUTPATIENT
Start: 2021-09-15

## 2021-09-15 RX ORDER — LEVOTHYROXINE SODIUM 100 UG/1
100 TABLET ORAL
Qty: 90 TABLET | Refills: 1 | Status: SHIPPED | OUTPATIENT
Start: 2021-09-15 | End: 2022-03-17 | Stop reason: SDUPTHER

## 2021-09-15 SDOH — SOCIAL DETERMINANTS OF HEALTH (SDOH): PROBLEMS IN RELATIONSHIP WITH SPOUSE OR PARTNER: Z63.0

## 2021-11-03 LAB
ALBUMIN SERPL-MCNC: 3.8 G/DL (ref 3.6–5.1)
ALBUMIN/GLOB SERPL: 1.4 (CALC) (ref 1–2.5)
ALP SERPL-CCNC: 61 U/L (ref 35–144)
ALT SERPL-CCNC: 16 U/L (ref 9–46)
AST SERPL-CCNC: 17 U/L (ref 10–35)
BASOPHILS # BLD AUTO: 31 CELLS/UL (ref 0–200)
BASOPHILS NFR BLD AUTO: 0.5 %
BILIRUB SERPL-MCNC: 0.4 MG/DL (ref 0.2–1.2)
BUN SERPL-MCNC: 21 MG/DL (ref 7–25)
BUN/CREAT SERPL: 15 (CALC) (ref 6–22)
CALCIUM SERPL-MCNC: 8.6 MG/DL (ref 8.6–10.3)
CHLORIDE SERPL-SCNC: 107 MMOL/L (ref 98–110)
CHOLEST SERPL-MCNC: 132 MG/DL
CHOLEST/HDLC SERPL: 3.4 (CALC)
CO2 SERPL-SCNC: 26 MMOL/L (ref 20–32)
CREAT SERPL-MCNC: 1.42 MG/DL (ref 0.7–1.18)
EOSINOPHIL # BLD AUTO: 189 CELLS/UL (ref 15–500)
EOSINOPHIL NFR BLD AUTO: 3.1 %
ERYTHROCYTE [DISTWIDTH] IN BLOOD BY AUTOMATED COUNT: 12.6 % (ref 11–15)
GLOBULIN SER CALC-MCNC: 2.7 G/DL (CALC) (ref 1.9–3.7)
GLUCOSE SERPL-MCNC: 130 MG/DL (ref 65–99)
HCT VFR BLD AUTO: 40.8 % (ref 38.5–50)
HDLC SERPL-MCNC: 39 MG/DL
HGB BLD-MCNC: 13.7 G/DL (ref 13.2–17.1)
LDLC SERPL CALC-MCNC: 72 MG/DL (CALC)
LYMPHOCYTES # BLD AUTO: 1458 CELLS/UL (ref 850–3900)
LYMPHOCYTES NFR BLD AUTO: 23.9 %
MCH RBC QN AUTO: 32.2 PG (ref 27–33)
MCHC RBC AUTO-ENTMCNC: 33.6 G/DL (ref 32–36)
MCV RBC AUTO: 96 FL (ref 80–100)
MONOCYTES # BLD AUTO: 744 CELLS/UL (ref 200–950)
MONOCYTES NFR BLD AUTO: 12.2 %
NEUTROPHILS # BLD AUTO: 3678 CELLS/UL (ref 1500–7800)
NEUTROPHILS NFR BLD AUTO: 60.3 %
NONHDLC SERPL-MCNC: 93 MG/DL (CALC)
PLATELET # BLD AUTO: 234 THOUSAND/UL (ref 140–400)
PMV BLD REES-ECKER: 9.8 FL (ref 7.5–12.5)
POTASSIUM SERPL-SCNC: 4.2 MMOL/L (ref 3.5–5.3)
PROT SERPL-MCNC: 6.5 G/DL (ref 6.1–8.1)
PSA SERPL-MCNC: 0.67 NG/ML
RBC # BLD AUTO: 4.25 MILLION/UL (ref 4.2–5.8)
SODIUM SERPL-SCNC: 141 MMOL/L (ref 135–146)
TRIGL SERPL-MCNC: 119 MG/DL
WBC # BLD AUTO: 6.1 THOUSAND/UL (ref 3.8–10.8)

## 2021-11-08 ENCOUNTER — TELEPHONE (OUTPATIENT)
Dept: FAMILY MEDICINE | Facility: CLINIC | Age: 76
End: 2021-11-08
Payer: MEDICARE

## 2022-01-22 ENCOUNTER — HOSPITAL ENCOUNTER (EMERGENCY)
Facility: HOSPITAL | Age: 77
Discharge: HOME | End: 2022-01-22
Attending: EMERGENCY MEDICINE
Payer: MEDICARE

## 2022-01-22 VITALS
OXYGEN SATURATION: 98 % | DIASTOLIC BLOOD PRESSURE: 58 MMHG | SYSTOLIC BLOOD PRESSURE: 142 MMHG | HEIGHT: 65 IN | WEIGHT: 176 LBS | HEART RATE: 70 BPM | BODY MASS INDEX: 29.32 KG/M2 | TEMPERATURE: 98 F | RESPIRATION RATE: 18 BRPM

## 2022-01-22 DIAGNOSIS — S05.01XA ABRASION OF RIGHT CORNEA, INITIAL ENCOUNTER: Primary | ICD-10-CM

## 2022-01-22 PROCEDURE — 90471 IMMUNIZATION ADMIN: CPT | Performed by: EMERGENCY MEDICINE

## 2022-01-22 PROCEDURE — 63600175 PHARM REV CODE 636 W HCPCS: Performed by: EMERGENCY MEDICINE

## 2022-01-22 PROCEDURE — 99284 EMERGENCY DEPT VISIT MOD MDM: CPT | Mod: 25

## 2022-01-22 PROCEDURE — 90715 TDAP VACCINE 7 YRS/> IM: CPT | Performed by: EMERGENCY MEDICINE

## 2022-01-22 PROCEDURE — 25000003 PHARM REV CODE 250: Performed by: EMERGENCY MEDICINE

## 2022-01-22 RX ORDER — ERYTHROMYCIN 5 MG/G
OINTMENT OPHTHALMIC
Qty: 3.5 G | Refills: 0 | OUTPATIENT
Start: 2022-01-22 | End: 2023-08-26

## 2022-01-22 RX ADMIN — FLUORESCEIN SODIUM 1 EACH: 1 STRIP OPHTHALMIC at 07:01

## 2022-01-22 RX ADMIN — TETANUS TOXOID, REDUCED DIPHTHERIA TOXOID AND ACELLULAR PERTUSSIS VACCINE, ADSORBED 0.5 ML: 5; 2.5; 8; 8; 2.5 SUSPENSION INTRAMUSCULAR at 07:01

## 2022-01-23 NOTE — ED PROVIDER NOTES
Chief complaint:  Eye Injury (Tree limb hit rt eye )      HPI:  Mor Hill is a 76 y.o. male presenting with right eye injury.  Patient was trimming tree branches when 1 swelling backwards and struck him in the right eye.  He complains of minimal, gritty pain in the right eye since then.  He has had prior cataract surgery bilaterally.  He denies visual change.  He denies headache or significant closed head injury.  He did not fall.  There is no associated numbness or weakness.    ROS: As per HPI and below:  No diplopia, foreign body sensation, loss of consciousness.  He is hard of hearing.    Review of patient's allergies indicates:   Allergen Reactions    Iodinated contrast media     Iodine      Other reaction(s): in XRAY CONTRAST --swelling    Iodine and iodide containing products     Pentothal [thiopental sodium]      Trouble waking up, went into a deep sleep       Discharge Medication List as of 1/22/2022  7:16 PM      START taking these medications    Details   erythromycin (ROMYCIN) ophthalmic ointment Place a 1/2 inch ribbon of ointment into right the lower eyelid twice daily for 3 days., Print         CONTINUE these medications which have NOT CHANGED    Details   aspirin 325 MG tablet Take 1 tablet (325 mg total) by mouth 2 (two) times daily., Starting Tue 2/12/2019, Until Thu 3/14/2019, No Print      atenoloL (TENORMIN) 25 MG tablet Take 1 tablet (25 mg total) by mouth every evening., Starting Wed 9/15/2021, Normal      !! atorvastatin (LIPITOR) 20 MG tablet Take 1 tablet (20 mg total) by mouth once daily., Starting Thu 9/2/2021, Normal      !! atorvastatin (LIPITOR) 40 MG tablet Take 1 tablet (40 mg total) by mouth once daily., Starting Wed 9/15/2021, Until Thu 9/15/2022, Normal      clopidogreL (PLAVIX) 75 mg tablet Take 1 tablet (75 mg total) by mouth once daily., Starting Wed 7/29/2020, Normal      cyclobenzaprine (FLEXERIL) 10 MG tablet Take 1 tablet (10 mg total) by mouth 2 (two) times daily  as needed., Starting Wed 7/29/2020, Normal      furosemide (LASIX) 20 MG tablet Take 20 mg by mouth once daily., Historical Med      ibuprofen (ADVIL,MOTRIN) 200 MG tablet Take 2 tablets by mouth., Historical Med      levothyroxine (SYNTHROID) 100 MCG tablet Take 1 tablet (100 mcg total) by mouth before breakfast., Starting Wed 9/15/2021, Until Thu 9/15/2022, Normal      loratadine (CLARITIN) 10 mg tablet Take 10 mg by mouth once daily., Historical Med      meclizine (ANTIVERT) 25 mg tablet Take 1 tablet (25 mg total) by mouth as needed., Starting Wed 9/15/2021, Normal      omeprazole (PRILOSEC) 20 MG capsule Take 1 capsule (20 mg total) by mouth once daily., Starting Wed 9/15/2021, Normal      phenylephrine HCl/acetaminophn (SINUS RELIEF, NON-DROWSY, ORAL) Take by mouth., Historical Med       !! - Potential duplicate medications found. Please discuss with provider.          PMH:  As per HPI and below:  Past Medical History:   Diagnosis Date    Anticoagulant long-term use     Arthritis     COPD (chronic obstructive pulmonary disease)     Coronary artery disease     Fx     RIBS    Hearing loss     LEFT EAR AID; RIGHT EAR OTC DEVICE    Heart attack     Hypertension     Thyroid disease     Vertigo      Past Surgical History:   Procedure Laterality Date    bilateral hammer toe      X 5    CARDIAC SURGERY      bypass X 3, ANEURYSM BY LEFT VENTRICLE    EYE SURGERY Bilateral     CATARACT    HERNIA REPAIR      ABD X 3    left RCR      LUNG REMOVAL, PARTIAL Left     NASAL FRACTURE SURGERY      PATELLAR TENDON REPAIR Left 2/11/2019    Procedure: REPAIR, TENDON, PATELLAR;  Surgeon: Renny Linda MD;  Location: [facility];  Service: Orthopedics;  Laterality: Left;    ROTATOR CUFF REPAIR      left X 3       Social History     Socioeconomic History    Marital status:    Tobacco Use    Smoking status: Former Smoker    Smokeless tobacco: Never Used   Substance and Sexual Activity    Alcohol use: Yes      Comment: daily boubon and cola    Drug use: No       No family history on file.    Physical Exam:    Vitals:    01/22/22 1941   BP: (!) 142/58   Pulse: 70   Resp: 18   Temp:      GENERAL:  No apparent distress.  Alert.    HEENT:  Moist mucous membranes.  Normocephalic and atraumatic.  EOMI.  Surgical pupils b/l.  No sign of FB on eversion of upper and lower right eyelids.  There is a 2 mm area of focal fluorescein at take overlying the iris at the 7 o'clock position.  Negative Nelson's sign.  No corneal foreign body noted.  NECK:  No swelling.  Midline trachea.   CARDIOVASCULAR:  Regular rate and rhythm.  2+ radial pulses.    PULMONARY:  Lungs clear to auscultation bilaterally.  No wheezes, rales, or rhonci.    EXTREMITIES:  Warm and well perfused.  Brisk capillary refill.    NEUROLOGICAL:  Normal mental status.  Appropriate and conversant.  5/5 strength and sensation.  CN III through XII intact.    SKIN:  No rashes or ecchymoses.        Labs Reviewed - No data to display    Discharge Medication List as of 1/22/2022  7:16 PM      START taking these medications    Details   erythromycin (ROMYCIN) ophthalmic ointment Place a 1/2 inch ribbon of ointment into right the lower eyelid twice daily for 3 days., Print         CONTINUE these medications which have NOT CHANGED    Details   aspirin 325 MG tablet Take 1 tablet (325 mg total) by mouth 2 (two) times daily., Starting Tue 2/12/2019, Until Thu 3/14/2019, No Print      atenoloL (TENORMIN) 25 MG tablet Take 1 tablet (25 mg total) by mouth every evening., Starting Wed 9/15/2021, Normal      !! atorvastatin (LIPITOR) 20 MG tablet Take 1 tablet (20 mg total) by mouth once daily., Starting Thu 9/2/2021, Normal      !! atorvastatin (LIPITOR) 40 MG tablet Take 1 tablet (40 mg total) by mouth once daily., Starting Wed 9/15/2021, Until Thu 9/15/2022, Normal      clopidogreL (PLAVIX) 75 mg tablet Take 1 tablet (75 mg total) by mouth once daily., Starting Wed 7/29/2020, Normal       cyclobenzaprine (FLEXERIL) 10 MG tablet Take 1 tablet (10 mg total) by mouth 2 (two) times daily as needed., Starting Wed 7/29/2020, Normal      furosemide (LASIX) 20 MG tablet Take 20 mg by mouth once daily., Historical Med      ibuprofen (ADVIL,MOTRIN) 200 MG tablet Take 2 tablets by mouth., Historical Med      levothyroxine (SYNTHROID) 100 MCG tablet Take 1 tablet (100 mcg total) by mouth before breakfast., Starting Wed 9/15/2021, Until Thu 9/15/2022, Normal      loratadine (CLARITIN) 10 mg tablet Take 10 mg by mouth once daily., Historical Med      meclizine (ANTIVERT) 25 mg tablet Take 1 tablet (25 mg total) by mouth as needed., Starting Wed 9/15/2021, Normal      omeprazole (PRILOSEC) 20 MG capsule Take 1 capsule (20 mg total) by mouth once daily., Starting Wed 9/15/2021, Normal      phenylephrine HCl/acetaminophn (SINUS RELIEF, NON-DROWSY, ORAL) Take by mouth., Historical Med       !! - Potential duplicate medications found. Please discuss with provider.          Orders Placed This Encounter   Procedures    Visual acuity screening       Imaging Results    None              MDM:    76 y.o. male with right corneal abrasion.  No sign of further complications such as foreign body or open globe.  Tetanus immunization updated with prophylactic ophthalmic antibiotics given pending short-term follow-up.  Follow-up with ophthalmology.  Return precautions reviewed.    Diagnoses:    1. Right corneal abrasion     Jose Alberto Conway MD  01/22/22 4741

## 2022-03-17 ENCOUNTER — OFFICE VISIT (OUTPATIENT)
Dept: FAMILY MEDICINE | Facility: CLINIC | Age: 77
End: 2022-03-17
Payer: MEDICARE

## 2022-03-17 VITALS
WEIGHT: 172 LBS | HEART RATE: 76 BPM | SYSTOLIC BLOOD PRESSURE: 124 MMHG | HEIGHT: 65 IN | DIASTOLIC BLOOD PRESSURE: 70 MMHG | BODY MASS INDEX: 28.66 KG/M2

## 2022-03-17 DIAGNOSIS — Z63.0 MARITAL PROBLEMS: ICD-10-CM

## 2022-03-17 DIAGNOSIS — G62.9 NEUROPATHY: ICD-10-CM

## 2022-03-17 DIAGNOSIS — S86.812A TENDON RUPTURE, TRAUMATIC, PATELLA, LEFT, INITIAL ENCOUNTER: ICD-10-CM

## 2022-03-17 DIAGNOSIS — H90.0 CONDUCTIVE HEARING LOSS, BILATERAL: ICD-10-CM

## 2022-03-17 DIAGNOSIS — R41.3 MEMORY LOSS: ICD-10-CM

## 2022-03-17 DIAGNOSIS — R42 VERTIGO: ICD-10-CM

## 2022-03-17 DIAGNOSIS — Z95.1 HISTORY OF CORONARY ARTERY BYPASS GRAFT: ICD-10-CM

## 2022-03-17 DIAGNOSIS — I25.10 CORONARY ARTERY DISEASE INVOLVING NATIVE HEART WITHOUT ANGINA PECTORIS, UNSPECIFIED VESSEL OR LESION TYPE: Primary | ICD-10-CM

## 2022-03-17 DIAGNOSIS — E03.9 ACQUIRED HYPOTHYROIDISM: ICD-10-CM

## 2022-03-17 DIAGNOSIS — M17.32 POST-TRAUMATIC OSTEOARTHRITIS OF LEFT KNEE: ICD-10-CM

## 2022-03-17 DIAGNOSIS — I10 HYPERTENSION, UNSPECIFIED TYPE: ICD-10-CM

## 2022-03-17 DIAGNOSIS — Z96.652 S/P TOTAL KNEE ARTHROPLASTY, LEFT: ICD-10-CM

## 2022-03-17 PROCEDURE — 3288F FALL RISK ASSESSMENT DOCD: CPT | Mod: S$GLB,,, | Performed by: FAMILY MEDICINE

## 2022-03-17 PROCEDURE — 1100F PTFALLS ASSESS-DOCD GE2>/YR: CPT | Mod: S$GLB,,, | Performed by: FAMILY MEDICINE

## 2022-03-17 PROCEDURE — 99214 OFFICE O/P EST MOD 30 MIN: CPT | Mod: S$GLB,,, | Performed by: FAMILY MEDICINE

## 2022-03-17 PROCEDURE — 1100F PR PT FALLS ASSESS DOC 2+ FALLS/FALL W/INJURY/YR: ICD-10-PCS | Mod: S$GLB,,, | Performed by: FAMILY MEDICINE

## 2022-03-17 PROCEDURE — 3074F PR MOST RECENT SYSTOLIC BLOOD PRESSURE < 130 MM HG: ICD-10-PCS | Mod: S$GLB,,, | Performed by: FAMILY MEDICINE

## 2022-03-17 PROCEDURE — 3074F SYST BP LT 130 MM HG: CPT | Mod: S$GLB,,, | Performed by: FAMILY MEDICINE

## 2022-03-17 PROCEDURE — 3288F PR FALLS RISK ASSESSMENT DOCUMENTED: ICD-10-PCS | Mod: S$GLB,,, | Performed by: FAMILY MEDICINE

## 2022-03-17 PROCEDURE — 3078F DIAST BP <80 MM HG: CPT | Mod: S$GLB,,, | Performed by: FAMILY MEDICINE

## 2022-03-17 PROCEDURE — 99214 PR OFFICE/OUTPT VISIT, EST, LEVL IV, 30-39 MIN: ICD-10-PCS | Mod: S$GLB,,, | Performed by: FAMILY MEDICINE

## 2022-03-17 PROCEDURE — 1159F PR MEDICATION LIST DOCUMENTED IN MEDICAL RECORD: ICD-10-PCS | Mod: S$GLB,,, | Performed by: FAMILY MEDICINE

## 2022-03-17 PROCEDURE — 3078F PR MOST RECENT DIASTOLIC BLOOD PRESSURE < 80 MM HG: ICD-10-PCS | Mod: S$GLB,,, | Performed by: FAMILY MEDICINE

## 2022-03-17 PROCEDURE — 1159F MED LIST DOCD IN RCRD: CPT | Mod: S$GLB,,, | Performed by: FAMILY MEDICINE

## 2022-03-17 RX ORDER — CLOPIDOGREL BISULFATE 75 MG/1
75 TABLET ORAL DAILY
Qty: 90 TABLET | Refills: 1 | Status: SHIPPED | OUTPATIENT
Start: 2022-03-17 | End: 2022-09-28 | Stop reason: SDUPTHER

## 2022-03-17 RX ORDER — ATENOLOL 25 MG/1
25 TABLET ORAL NIGHTLY
Qty: 90 TABLET | Refills: 1 | Status: SHIPPED | OUTPATIENT
Start: 2022-03-17 | End: 2023-08-10 | Stop reason: SDUPTHER

## 2022-03-17 RX ORDER — MECLIZINE HYDROCHLORIDE 25 MG/1
25 TABLET ORAL
Qty: 90 TABLET | Refills: 1 | Status: SHIPPED | OUTPATIENT
Start: 2022-03-17 | End: 2022-09-28 | Stop reason: SDUPTHER

## 2022-03-17 RX ORDER — LEVOTHYROXINE SODIUM 100 UG/1
100 TABLET ORAL
Qty: 90 TABLET | Refills: 1 | Status: SHIPPED | OUTPATIENT
Start: 2022-03-17 | End: 2022-09-28 | Stop reason: SDUPTHER

## 2022-03-17 RX ORDER — ERYTHROMYCIN 5 MG/G
OINTMENT OPHTHALMIC
Qty: 3.5 G | Refills: 0 | Status: CANCELLED | OUTPATIENT
Start: 2022-03-17

## 2022-03-17 SDOH — SOCIAL DETERMINANTS OF HEALTH (SDOH): PROBLEMS IN RELATIONSHIP WITH SPOUSE OR PARTNER: Z63.0

## 2022-03-17 NOTE — MEDICAL/APP STUDENT
Subjective:       Patient ID: Mor Hill is a 76 y.o. male.    Chief Complaint: Follow-up (No bottles // need refills // pain of both feet // obstructive sleep // abc)    Mor Hill is a 76 y.o male with a PMH of HTN, hypothyroidism, memory loss, total left knee arthroplasty and progressive memory loss. Patient says he has been deaf since his 20s, used to have hearing aids but no longer wants them despite deafness progressing. Patient claims knee and foot pain status post surgeries for knee replacement and hammer toes. Patient was unaware of appointment until day prior so no labs were drawn.    Follow-up  Associated symptoms include arthralgias, chest pain and coughing. Pertinent negatives include no congestion, fatigue or fever.     Review of Systems   Constitutional: Positive for activity change. Negative for appetite change, fatigue and fever.   HENT: Positive for hearing loss. Negative for nasal congestion, ear pain and postnasal drip.    Eyes: Negative for visual disturbance.   Respiratory: Positive for cough. Negative for choking and shortness of breath.    Cardiovascular: Positive for chest pain.   Gastrointestinal: Negative for abdominal distention, constipation and diarrhea.   Genitourinary: Negative.    Musculoskeletal: Positive for arthralgias and back pain.   Neurological: Positive for memory loss.   Psychiatric/Behavioral: Positive for sleep disturbance.         Objective:      Physical Exam  Constitutional:       Appearance: Normal appearance.   Cardiovascular:      Rate and Rhythm: Normal rate and regular rhythm.      Pulses: Normal pulses.      Heart sounds: Normal heart sounds.   Pulmonary:      Effort: Pulmonary effort is normal.      Breath sounds: Normal breath sounds.   Musculoskeletal:         General: Deformity and signs of injury present.      Cervical back: Normal range of motion.      Comments: Right knee replacement and hammer toe surgery. Still experiencing pain   Neurological:       Mental Status: He is alert.      Sensory: Sensory deficit present.         Assessment:     Mor Hill is a 76 y.o male with a PMH of HTN, hypothyroidism, memory loss, total left knee arthroplasty and progressive memory loss. Patient not under any acute distress with minimal changes from previous visit  Problem List Items Addressed This Visit        Cardiac/Vascular    Hypertension    Coronary artery disease       Endocrine    Acquired hypothyroidism      Other Visit Diagnoses     Vertigo              Plan:       Deafness-   Patient says he prefers not to have hearing fixed, no treatment moving forward    HTN-  BP well controlled at 124/70, will keep current medications    Metabolic Disturbances-  Patient previously had history of thyroid disease and CAD. Labs ordered to assess

## 2022-03-18 LAB — TSH SERPL-ACNC: 2.65 MIU/L (ref 0.4–4.5)

## 2022-03-18 NOTE — PROGRESS NOTES
SUBJECTIVE:    Patient ID: Mor Hill is a 76 y.o. male.    Chief Complaint: Follow-up (No bottles // need refills // pain of both feet // obstructive sleep // abc)     Patient ID: Mor Hill is a 76 y.o. male.     Chief Complaint: Follow-up (No bottles // need refills // pain of both feet // obstructive sleep // abc)     Mor Hill is a 76 y.o male with a PMH of HTN, hypothyroidism, memory loss, total left knee arthroplasty and progressive memory loss. Patient says he has been deaf since his 20s, used to have hearing aids but no longer wants them despite deafness progressing. Patient claims knee and foot pain status post surgeries for knee replacement and hammer toes. Patient was unaware of appointment until day prior so no labs were drawn.     Follow-up  Associated symptoms include arthralgias, chest pain and coughing. Pertinent negatives include no congestion, fatigue or fever.      Review of Systems   Constitutional: Positive for activity change. Negative for appetite change, fatigue and fever.   HENT: Positive for hearing loss. Negative for nasal congestion, ear pain and postnasal drip.    Eyes: Negative for visual disturbance.   Respiratory: Positive for cough. Negative for choking and shortness of breath.    Cardiovascular: Positive for chest pain.   Gastrointestinal: Negative for abdominal distention, constipation and diarrhea.   Genitourinary: Negative.    Musculoskeletal: Positive for arthralgias and back pain.   Neurological: Positive for memory loss.   Psychiatric/Behavioral: Positive for sleep disturbance.          Objective:  Physical Exam  Constitutional:       Appearance: Normal appearance.   Cardiovascular:      Rate and Rhythm: Normal rate and regular rhythm.      Pulses: Normal pulses.      Heart sounds: Normal heart sounds.   Pulmonary:      Effort: Pulmonary effort is normal.      Breath sounds: Normal breath sounds.   Musculoskeletal:         General: Deformity and signs of injury  present.      Cervical back: Normal range of motion.      Comments: Right knee replacement and hammer toe surgery. Still experiencing pain   Neurological:      Mental Status: He is alert.      Sensory: Sensory deficit present.        Assessment:    Mor Hill is a 76 y.o male with a PMH of HTN, hypothyroidism, memory loss, total left knee arthroplasty and progressive memory loss. Patient not under any acute distress with minimal changes from this visit.        No visits with results within 6 Month(s) from this visit.   Latest known visit with results is:   Office Visit on 09/15/2021   Component Date Value Ref Range Status    WBC 11/01/2021 6.1  3.8 - 10.8 Thousand/uL Final    RBC 11/01/2021 4.25  4.20 - 5.80 Million/uL Final    Hemoglobin 11/01/2021 13.7  13.2 - 17.1 g/dL Final    Hematocrit 11/01/2021 40.8  38.5 - 50.0 % Final    MCV 11/01/2021 96.0  80.0 - 100.0 fL Final    MCH 11/01/2021 32.2  27.0 - 33.0 pg Final    MCHC 11/01/2021 33.6  32.0 - 36.0 g/dL Final    RDW 11/01/2021 12.6  11.0 - 15.0 % Final    Platelets 11/01/2021 234  140 - 400 Thousand/uL Final    MPV 11/01/2021 9.8  7.5 - 12.5 fL Final    Neutrophils, Abs 11/01/2021 3,678  1,500 - 7,800 cells/uL Final    Lymph # 11/01/2021 1,458  850 - 3,900 cells/uL Final    Mono # 11/01/2021 744  200 - 950 cells/uL Final    Eos # 11/01/2021 189  15 - 500 cells/uL Final    Baso # 11/01/2021 31  0 - 200 cells/uL Final    Neutrophils Relative 11/01/2021 60.3  % Final    Lymph % 11/01/2021 23.9  % Final    Mono % 11/01/2021 12.2  % Final    Eosinophil % 11/01/2021 3.1  % Final    Basophil % 11/01/2021 0.5  % Final    Glucose 11/01/2021 130 (A) 65 - 99 mg/dL Final    BUN 11/01/2021 21  7 - 25 mg/dL Final    Creatinine 11/01/2021 1.42 (A) 0.70 - 1.18 mg/dL Final    eGFR if non  11/01/2021 48 (A) > OR = 60 mL/min/1.73m2 Final    eGFR if  11/01/2021 56 (A) > OR = 60 mL/min/1.73m2 Final    BUN/Creatinine Ratio  11/01/2021 15  6 - 22 (calc) Final    Sodium 11/01/2021 141  135 - 146 mmol/L Final    Potassium 11/01/2021 4.2  3.5 - 5.3 mmol/L Final    Chloride 11/01/2021 107  98 - 110 mmol/L Final    CO2 11/01/2021 26  20 - 32 mmol/L Final    Calcium 11/01/2021 8.6  8.6 - 10.3 mg/dL Final    Total Protein 11/01/2021 6.5  6.1 - 8.1 g/dL Final    Albumin 11/01/2021 3.8  3.6 - 5.1 g/dL Final    Globulin, Total 11/01/2021 2.7  1.9 - 3.7 g/dL (calc) Final    Albumin/Globulin Ratio 11/01/2021 1.4  1.0 - 2.5 (calc) Final    Total Bilirubin 11/01/2021 0.4  0.2 - 1.2 mg/dL Final    Alkaline Phosphatase 11/01/2021 61  35 - 144 U/L Final    AST 11/01/2021 17  10 - 35 U/L Final    ALT 11/01/2021 16  9 - 46 U/L Final    Cholesterol 11/01/2021 132  <200 mg/dL Final    HDL 11/01/2021 39 (A) > OR = 40 mg/dL Final    Triglycerides 11/01/2021 119  <150 mg/dL Final    LDL Cholesterol 11/01/2021 72  mg/dL (calc) Final    HDL/Cholesterol Ratio 11/01/2021 3.4  <5.0 (calc) Final    Non HDL Chol. (LDL+VLDL) 11/01/2021 93  <130 mg/dL (calc) Final    PROSTATE SPECIFIC ANTIGEN, SCR - Q* 11/01/2021 0.67  < OR = 4.00 ng/mL Final       Past Medical History:   Diagnosis Date    Anticoagulant long-term use     Arthritis     COPD (chronic obstructive pulmonary disease)     Coronary artery disease     Fx     RIBS    Hearing loss     LEFT EAR AID; RIGHT EAR OTC DEVICE    Heart attack     Hypertension     Thyroid disease     Vertigo      Social History     Socioeconomic History    Marital status:    Tobacco Use    Smoking status: Former Smoker    Smokeless tobacco: Never Used   Substance and Sexual Activity    Alcohol use: Yes     Comment: daily boubon and cola    Drug use: No     Past Surgical History:   Procedure Laterality Date    bilateral hammer toe      X 5    CARDIAC SURGERY      bypass X 3, 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    Review of patient's allergies indicates:   Allergen Reactions    Iodinated contrast media     Iodine      Other reaction(s): in XRAY CONTRAST --swelling    Iodine and iodide containing products     Pentothal [thiopental sodium]      Trouble waking up, went into a deep sleep       Current Outpatient Medications:     atorvastatin (LIPITOR) 20 MG tablet, Take 1 tablet (20 mg total) by mouth once daily., Disp: 90 tablet, Rfl: 1    atorvastatin (LIPITOR) 40 MG tablet, Take 1 tablet (40 mg total) by mouth once daily., Disp: 90 tablet, Rfl: 1    cyclobenzaprine (FLEXERIL) 10 MG tablet, Take 1 tablet (10 mg total) by mouth 2 (two) times daily as needed., Disp: 60 tablet, Rfl: 2    erythromycin (ROMYCIN) ophthalmic ointment, Place a 1/2 inch ribbon of ointment into right the lower eyelid twice daily for 3 days., Disp: 3.5 g, Rfl: 0    furosemide (LASIX) 20 MG tablet, Take 20 mg by mouth once daily., Disp: , Rfl:     ibuprofen (ADVIL,MOTRIN) 200 MG tablet, Take 2 tablets by mouth., Disp: , Rfl:     loratadine (CLARITIN) 10 mg tablet, Take 10 mg by mouth once daily., Disp: , Rfl:     omeprazole (PRILOSEC) 20 MG capsule, Take 1 capsule (20 mg total) by mouth once daily., Disp: 90 capsule, Rfl: 3    phenylephrine HCl/acetaminophn (SINUS RELIEF, NON-DROWSY, ORAL), Take by mouth., Disp: , Rfl:     aspirin 325 MG tablet, Take 1 tablet (325 mg total) by mouth 2 (two) times daily., Disp: , Rfl: 0    atenoloL (TENORMIN) 25 MG tablet, Take 1 tablet (25 mg total) by mouth every evening., Disp: 90 tablet, Rfl: 1    clopidogreL (PLAVIX) 75 mg tablet, Take 1 tablet (75 mg total) by mouth once daily., Disp: 90 tablet, Rfl: 1     "levothyroxine (SYNTHROID) 100 MCG tablet, Take 1 tablet (100 mcg total) by mouth before breakfast., Disp: 90 tablet, Rfl: 1    meclizine (ANTIVERT) 25 mg tablet, Take 1 tablet (25 mg total) by mouth as needed., Disp: 90 tablet, Rfl: 1    Review of Systems       Objective:      Vitals:    03/17/22 1407   BP: 124/70   Pulse: 76   Weight: 78 kg (172 lb)   Height: 5' 5" (1.651 m)     Physical Exam      Assessment:       1. Coronary artery disease involving native heart without angina pectoris, unspecified vessel or lesion type    2. Hypertension, unspecified type    3. Vertigo    4. Acquired hypothyroidism    5. Post-traumatic osteoarthritis of left knee    6. S/P total knee arthroplasty, left    7. Tendon rupture, traumatic, patella, left, initial encounter    8. Memory loss    9. Marital problems    10. Conductive hearing loss, bilateral    11. History of coronary artery bypass graft    12. Neuropathy         Plan:       Coronary artery disease involving native heart without angina pectoris, unspecified vessel or lesion type  -     clopidogreL (PLAVIX) 75 mg tablet; Take 1 tablet (75 mg total) by mouth once daily.  Dispense: 90 tablet; Refill: 1  Angina is stable, continue Plavix.  Hypertension, unspecified type  Comments:  Currently stable and well controlled.  Continue as is.  Orders:  -     atenoloL (TENORMIN) 25 MG tablet; Take 1 tablet (25 mg total) by mouth every evening.  Dispense: 90 tablet; Refill: 1    Vertigo  -     meclizine (ANTIVERT) 25 mg tablet; Take 1 tablet (25 mg total) by mouth as needed.  Dispense: 90 tablet; Refill: 1  Use meclizine as needed for dizziness /  Acquired hypothyroidism  Comments:  Currently well managed on Synthroid 100mcg. Refills sent today.   Orders:  -     levothyroxine (SYNTHROID) 100 MCG tablet; Take 1 tablet (100 mcg total) by mouth before breakfast.  Dispense: 90 tablet; Refill: 1  -     TSH w/reflex to FT4; Future; Expected date: 03/17/2022  Recheck TSH in 6 " months  Post-traumatic osteoarthritis of left knee  Doing well with minimal pain.  S/P total knee arthroplasty, left    Tendon rupture, traumatic, patella, left, initial encounter    Memory loss    Marital problems  Patient feels he has a stressful marriage.  Complains wife drinks too much.  Conductive hearing loss, bilateral  Does not wear hearing aids however  History of coronary artery bypass graft    Neuropathy      Follow up in about 6 months (around 9/17/2022), or CAD.        3/17/2022 Ayden Carter

## 2022-03-21 ENCOUNTER — TELEPHONE (OUTPATIENT)
Dept: FAMILY MEDICINE | Facility: CLINIC | Age: 77
End: 2022-03-21
Payer: MEDICARE

## 2022-03-21 NOTE — TELEPHONE ENCOUNTER
----- Message from Ayden Carter MD sent at 3/18/2022  7:59 PM CDT -----  Call patient.  His TSH and thyroid were normal.  Continue current medications.

## 2022-09-21 ENCOUNTER — TELEPHONE (OUTPATIENT)
Dept: FAMILY MEDICINE | Facility: CLINIC | Age: 77
End: 2022-09-21

## 2022-09-21 NOTE — TELEPHONE ENCOUNTER
----- Message from Loida Estevez MA sent at 9/21/2022  3:01 PM CDT -----  Pt is calling to find out his next appt. I informed him his appt was at 2:40 today and he stated he didn't know that and would like to reschedule.

## 2022-09-23 ENCOUNTER — CLINICAL SUPPORT (OUTPATIENT)
Dept: FAMILY MEDICINE | Facility: CLINIC | Age: 77
End: 2022-09-23
Payer: MEDICARE

## 2022-09-23 ENCOUNTER — TELEPHONE (OUTPATIENT)
Dept: FAMILY MEDICINE | Facility: CLINIC | Age: 77
End: 2022-09-23

## 2022-09-23 DIAGNOSIS — E78.2 MIXED HYPERLIPIDEMIA: ICD-10-CM

## 2022-09-23 DIAGNOSIS — M17.32 POST-TRAUMATIC OSTEOARTHRITIS OF LEFT KNEE: ICD-10-CM

## 2022-09-23 DIAGNOSIS — Z79.899 ENCOUNTER FOR LONG-TERM (CURRENT) USE OF OTHER MEDICATIONS: Primary | ICD-10-CM

## 2022-09-23 DIAGNOSIS — E03.9 ACQUIRED HYPOTHYROIDISM: Primary | ICD-10-CM

## 2022-09-23 RX ORDER — ATORVASTATIN CALCIUM 20 MG/1
20 TABLET, FILM COATED ORAL DAILY
Qty: 90 TABLET | Refills: 1 | Status: CANCELLED | OUTPATIENT
Start: 2022-09-23

## 2022-09-23 RX ORDER — ATORVASTATIN CALCIUM 20 MG/1
20 TABLET, FILM COATED ORAL DAILY
Qty: 90 TABLET | Refills: 1 | Status: SHIPPED | OUTPATIENT
Start: 2022-09-23 | End: 2023-03-29 | Stop reason: SDUPTHER

## 2022-09-23 RX ORDER — CYCLOBENZAPRINE HCL 10 MG
10 TABLET ORAL 2 TIMES DAILY PRN
Qty: 60 TABLET | Refills: 2 | Status: CANCELLED | OUTPATIENT
Start: 2022-09-23

## 2022-09-23 RX ORDER — CYCLOBENZAPRINE HCL 10 MG
10 TABLET ORAL 2 TIMES DAILY PRN
Qty: 60 TABLET | Refills: 2 | Status: SHIPPED | OUTPATIENT
Start: 2022-09-23 | End: 2022-09-28 | Stop reason: SDUPTHER

## 2022-09-23 NOTE — PROGRESS NOTES
Date of Office Visit: 2018  Encounter Provider: ZULEIKA Mast  Place of Service: [facility] CARDIOLOGY  Patient Name: Janusz Pfeiffer  :1952    Chief Complaint   Patient presents with   • Coronary Artery Disease   :     HPI: Janusz Pfeiffer is a 66 y.o. male is a patient of Dr. Mosley. I am seeing him today and have reviewed the records.     His past medical history is significant of coronary artery disease with prior LAD stent placement in 2000, status post coronary artery bypass grafting in 2016,hypertension, hyperlipidemia, and myocardial infarction.    In 2000 he had stent placement to the LAD.  He had been doing well clinically.  He was in [place] for business trip in 2016 and woke up approximately 3 AM diaphoretic.  He proceeded to the cardiac center and had elevated troponin and a cardiac catheterization which indicated he needed coronary artery bypass grafting ×4 vessels.  He did participate in cardiac rehabilitation here in [place], [place].    He was last seen in the office on 2017.  He was doing well clinically and was to follow-up in one year. Since then he was taken off Toprol XL 25 daily and started on lopressor 25mg BID.    He presents today for a normal reassessment.  He denies palpitations, shortness of breath, chest pain, or fatigue.  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  He occasionally sees PACs or PVCs but no significant EKG changes.  He exercises 30 minutes a day Monday through Friday with longer exercise duration on the weekend.  He does aerobics, weight lifting, or cycling.  He participates in a class the dizziness 50 minutes of various exercising including over the head low weights up to 40 pounds with high repetitions.  He does not have any true pain with this but occasional has substernal discomfort which she has had for  Patient came in with 2 bottles he needed refills on. Set up in telephone encounter to be sent to [facility]. He agrees to bring all bottles to visit with Damián      "some time.  He is been monitoring his blood pressures at home that are slowly trending upward to 130/80.  He has values 111/63, 121/71, and then on 6/16/18 133/90.  He averages mid 130-140/70-80. To note, he was in the woods this weekend and had a tick removed from his backside in the office today.   Allergies   Allergen Reactions   • Morphine And Related Hives       Past Medical History:   Diagnosis Date   • CAD (coronary artery disease)    • Hypocholesteremia    • Ischemic heart disease    • Myocardial disease    • Myocardial infarction        Past Surgical History:   Procedure Laterality Date   • CORONARY ANGIOPLASTY         Family and social history reviewed.     ROS  All other systems were reviewed and are negative        Objective:     Vitals:    06/18/18 1315   BP: 150/90   BP Location: Left arm   Patient Position: Sitting   Pulse: 62   Weight: 92.5 kg (204 lb)   Height: 165.1 cm (65\")     Body mass index is 33.95 kg/m².    PHYSICAL EXAM:  Physical Exam   Constitutional: He is oriented to person, place, and time. He appears well-developed and well-nourished. No distress.   HENT:   Head: Normocephalic.   Eyes: Conjunctivae are normal.   Neck: Normal range of motion. No JVD present.   Cardiovascular: Normal rate, regular rhythm, normal heart sounds and intact distal pulses.    No murmur heard.  Pulses:       Carotid pulses are 2+ on the right side, and 2+ on the left side.       Radial pulses are 2+ on the right side, and 2+ on the left side.        Posterior tibial pulses are 2+ on the right side, and 2+ on the left side.   Pulmonary/Chest: Effort normal and breath sounds normal. No respiratory distress. He has no wheezes. He has no rhonchi. He has no rales. He exhibits no tenderness.   Abdominal: Soft. Bowel sounds are normal. He exhibits no distension.   Musculoskeletal: Normal range of motion. He exhibits no edema.   Neurological: He is alert and oriented to person, place, and time.   Skin: Skin is warm, " dry and intact. No rash noted. He is not diaphoretic. No cyanosis.   Psychiatric: He has a normal mood and affect. His behavior is normal. Judgment and thought content normal.         ECG 12 Lead  Date/Time: 6/18/2018 1:23 PM  Performed by: ANI JACKSON  Authorized by: ANI JACKSON   Comparison: compared with previous ECG from 2/24/2018  Similar to previous ECG  Rhythm: sinus rhythm  Rate: normal  BPM: 62  T depression: I and aVL  QRS axis: left  Other findings: PRWP  Clinical impression: abnormal ECG  Comments: T wave abnormalities in lateral leads unchanged            Current Outpatient Prescriptions   Medication Sig Dispense Refill   • aspirin 81 MG tablet Take 81 mg by mouth Daily.     • atorvastatin (LIPITOR) 40 MG tablet Take 40 mg by mouth Daily.     • coenzyme Q10 100 MG capsule Take 100 mg by mouth Daily.     • lisinopril (PRINIVIL,ZESTRIL) 10 MG tablet Take 10 mg by mouth Daily.     • metoprolol tartrate (LOPRESSOR) 25 MG tablet Take 25 mg by mouth 2 (Two) Times a Day.     • testosterone (ANDROGEL) 50 MG/5GM (1%) gel gel Apply one gram topically twice daily     • tadalafil (CIALIS) 5 MG tablet Take 5 mg by mouth Daily.       No current facility-administered medications for this visit.      Assessment:       Diagnosis Plan   1. Coronary artery disease involving coronary bypass graft of native heart without angina pectoris     2. S/P CABG x 4     3. Hypertension, unspecified type     4. Hyperlipidemia, unspecified hyperlipidemia type          Orders Placed This Encounter   Procedures   • ECG 12 Lead     This order was created via procedure documentation         Plan:       1.  Coronary artery disease status post coronary artery bypass grafting ×4.  He had a normal rehabilitation treadmill stress test on 12/22/2016 which was normal. No angina  Coronary Artery Disease  Assessment  • The patient has no angina    Plan  • Lifestyle modifications discussed include adhering to a heart healthy diet, avoidance of  tobacco products, maintenance of a healthy weight, medication compliance, regular exercise and regular monitoring of cholesterol and blood pressure    Subjective - Objective  • There is a history of past MI  • There is a history of previous coronary artery bypass graft 12/2016 x4 in [facility]  • There has been a previous stent procedure using ARPITA Stent to LAD in 08/2000  • Current antiplatelet therapy includes aspirin 81 mg      2.  Hyperlipidemia on atorvastatin 40 mg- lipids to be checked next month with dire department physical     3.  Hypertension-blood pressure 150/90.  He is monitoring at home and gets values of 11/63, 121/71, or on 6/16/2018 133/90.  He is to continue to monitor over the next couple weeks and call if greater than 130/80.  We discussed increasing his lisinopril to 20 mg at that time if necessary.  He verbalized understanding.    Follow up in 6 months with Dr. Mosley    Patient was instructed to call the office if new symptoms develop or report to nearest ER if heart attack or stroke is suspected.        It has been a pleasure to participate in this patient's care.      Thank you,  ZULEIKA Mast      **Jules Disclaimer:**  Much of this encounter note is an electronic transcription/translation of spoken language to printed text. The electronic translation of spoken language may permit erroneous, or at times, nonsensical words or phrases to be inadvertently transcribed. Although I have reviewed the note for such errors, some may still exist.

## 2022-09-24 LAB
ALBUMIN SERPL-MCNC: 4 G/DL (ref 3.6–5.1)
ALBUMIN/GLOB SERPL: 1.5 (CALC) (ref 1–2.5)
ALP SERPL-CCNC: 39 U/L (ref 35–144)
ALT SERPL-CCNC: 17 U/L (ref 9–46)
AST SERPL-CCNC: 16 U/L (ref 10–35)
BILIRUB SERPL-MCNC: 0.4 MG/DL (ref 0.2–1.2)
BUN SERPL-MCNC: 22 MG/DL (ref 7–25)
BUN/CREAT SERPL: 16 (CALC) (ref 6–22)
CALCIUM SERPL-MCNC: 9.1 MG/DL (ref 8.6–10.3)
CHLORIDE SERPL-SCNC: 104 MMOL/L (ref 98–110)
CHOLEST SERPL-MCNC: 182 MG/DL
CHOLEST/HDLC SERPL: 4 (CALC)
CO2 SERPL-SCNC: 29 MMOL/L (ref 20–32)
CREAT SERPL-MCNC: 1.37 MG/DL (ref 0.7–1.28)
EGFR: 54 ML/MIN/1.73M2
GLOBULIN SER CALC-MCNC: 2.7 G/DL (CALC) (ref 1.9–3.7)
GLUCOSE SERPL-MCNC: 102 MG/DL (ref 65–99)
HDLC SERPL-MCNC: 46 MG/DL
LDLC SERPL CALC-MCNC: 110 MG/DL (CALC)
NONHDLC SERPL-MCNC: 136 MG/DL (CALC)
POTASSIUM SERPL-SCNC: 4.2 MMOL/L (ref 3.5–5.3)
PROT SERPL-MCNC: 6.7 G/DL (ref 6.1–8.1)
SODIUM SERPL-SCNC: 140 MMOL/L (ref 135–146)
TRIGL SERPL-MCNC: 145 MG/DL

## 2022-09-28 ENCOUNTER — OFFICE VISIT (OUTPATIENT)
Dept: FAMILY MEDICINE | Facility: CLINIC | Age: 77
End: 2022-09-28
Payer: MEDICARE

## 2022-09-28 VITALS
SYSTOLIC BLOOD PRESSURE: 120 MMHG | OXYGEN SATURATION: 98 % | HEIGHT: 65 IN | TEMPERATURE: 98 F | WEIGHT: 181.19 LBS | HEART RATE: 86 BPM | DIASTOLIC BLOOD PRESSURE: 70 MMHG | BODY MASS INDEX: 30.19 KG/M2

## 2022-09-28 DIAGNOSIS — E78.2 MIXED HYPERLIPIDEMIA: Primary | ICD-10-CM

## 2022-09-28 DIAGNOSIS — K21.9 GASTROESOPHAGEAL REFLUX DISEASE WITHOUT ESOPHAGITIS: ICD-10-CM

## 2022-09-28 DIAGNOSIS — Z96.652 S/P TOTAL KNEE ARTHROPLASTY, LEFT: ICD-10-CM

## 2022-09-28 DIAGNOSIS — R42 VERTIGO: ICD-10-CM

## 2022-09-28 DIAGNOSIS — R41.3 MEMORY LOSS: ICD-10-CM

## 2022-09-28 DIAGNOSIS — M17.32 POST-TRAUMATIC OSTEOARTHRITIS OF LEFT KNEE: ICD-10-CM

## 2022-09-28 DIAGNOSIS — I10 HYPERTENSION, UNSPECIFIED TYPE: ICD-10-CM

## 2022-09-28 DIAGNOSIS — E03.9 ACQUIRED HYPOTHYROIDISM: ICD-10-CM

## 2022-09-28 DIAGNOSIS — I25.10 CORONARY ARTERY DISEASE INVOLVING NATIVE HEART WITHOUT ANGINA PECTORIS, UNSPECIFIED VESSEL OR LESION TYPE: ICD-10-CM

## 2022-09-28 DIAGNOSIS — Z23 NEED FOR INFLUENZA VACCINATION: ICD-10-CM

## 2022-09-28 DIAGNOSIS — Z63.0 MARITAL PROBLEMS: ICD-10-CM

## 2022-09-28 PROCEDURE — 90662 IIV NO PRSV INCREASED AG IM: CPT | Mod: S$GLB,,, | Performed by: PHYSICIAN ASSISTANT

## 2022-09-28 PROCEDURE — 3074F PR MOST RECENT SYSTOLIC BLOOD PRESSURE < 130 MM HG: ICD-10-PCS | Mod: CPTII,S$GLB,, | Performed by: PHYSICIAN ASSISTANT

## 2022-09-28 PROCEDURE — G0008 FLU VACCINE - QUADRIVALENT - HIGH DOSE (65+) PRESERVATIVE FREE IM: ICD-10-PCS | Mod: S$GLB,,, | Performed by: PHYSICIAN ASSISTANT

## 2022-09-28 PROCEDURE — 1159F PR MEDICATION LIST DOCUMENTED IN MEDICAL RECORD: ICD-10-PCS | Mod: CPTII,S$GLB,, | Performed by: PHYSICIAN ASSISTANT

## 2022-09-28 PROCEDURE — 1160F PR REVIEW ALL MEDS BY PRESCRIBER/CLIN PHARMACIST DOCUMENTED: ICD-10-PCS | Mod: CPTII,S$GLB,, | Performed by: PHYSICIAN ASSISTANT

## 2022-09-28 PROCEDURE — 3074F SYST BP LT 130 MM HG: CPT | Mod: CPTII,S$GLB,, | Performed by: PHYSICIAN ASSISTANT

## 2022-09-28 PROCEDURE — 90662 FLU VACCINE - QUADRIVALENT - HIGH DOSE (65+) PRESERVATIVE FREE IM: ICD-10-PCS | Mod: S$GLB,,, | Performed by: PHYSICIAN ASSISTANT

## 2022-09-28 PROCEDURE — 1101F PR PT FALLS ASSESS DOC 0-1 FALLS W/OUT INJ PAST YR: ICD-10-PCS | Mod: CPTII,S$GLB,, | Performed by: PHYSICIAN ASSISTANT

## 2022-09-28 PROCEDURE — 1160F RVW MEDS BY RX/DR IN RCRD: CPT | Mod: CPTII,S$GLB,, | Performed by: PHYSICIAN ASSISTANT

## 2022-09-28 PROCEDURE — 3078F DIAST BP <80 MM HG: CPT | Mod: CPTII,S$GLB,, | Performed by: PHYSICIAN ASSISTANT

## 2022-09-28 PROCEDURE — G0008 ADMIN INFLUENZA VIRUS VAC: HCPCS | Mod: S$GLB,,, | Performed by: PHYSICIAN ASSISTANT

## 2022-09-28 PROCEDURE — 99214 PR OFFICE/OUTPT VISIT, EST, LEVL IV, 30-39 MIN: ICD-10-PCS | Mod: 25,S$GLB,, | Performed by: PHYSICIAN ASSISTANT

## 2022-09-28 PROCEDURE — 3078F PR MOST RECENT DIASTOLIC BLOOD PRESSURE < 80 MM HG: ICD-10-PCS | Mod: CPTII,S$GLB,, | Performed by: PHYSICIAN ASSISTANT

## 2022-09-28 PROCEDURE — 99214 OFFICE O/P EST MOD 30 MIN: CPT | Mod: 25,S$GLB,, | Performed by: PHYSICIAN ASSISTANT

## 2022-09-28 PROCEDURE — 3288F FALL RISK ASSESSMENT DOCD: CPT | Mod: CPTII,S$GLB,, | Performed by: PHYSICIAN ASSISTANT

## 2022-09-28 PROCEDURE — 3288F PR FALLS RISK ASSESSMENT DOCUMENTED: ICD-10-PCS | Mod: CPTII,S$GLB,, | Performed by: PHYSICIAN ASSISTANT

## 2022-09-28 PROCEDURE — 1159F MED LIST DOCD IN RCRD: CPT | Mod: CPTII,S$GLB,, | Performed by: PHYSICIAN ASSISTANT

## 2022-09-28 PROCEDURE — 1101F PT FALLS ASSESS-DOCD LE1/YR: CPT | Mod: CPTII,S$GLB,, | Performed by: PHYSICIAN ASSISTANT

## 2022-09-28 RX ORDER — CYCLOBENZAPRINE HCL 10 MG
10 TABLET ORAL 2 TIMES DAILY PRN
Qty: 60 TABLET | Refills: 2 | Status: SHIPPED | OUTPATIENT
Start: 2022-09-28 | End: 2024-01-10 | Stop reason: SDUPTHER

## 2022-09-28 RX ORDER — OMEPRAZOLE 20 MG/1
20 CAPSULE, DELAYED RELEASE ORAL DAILY
Qty: 90 CAPSULE | Refills: 3 | Status: SHIPPED | OUTPATIENT
Start: 2022-09-28 | End: 2023-10-04 | Stop reason: SDUPTHER

## 2022-09-28 RX ORDER — CLOPIDOGREL BISULFATE 75 MG/1
75 TABLET ORAL DAILY
Qty: 90 TABLET | Refills: 1 | Status: SHIPPED | OUTPATIENT
Start: 2022-09-28 | End: 2023-03-29 | Stop reason: SDUPTHER

## 2022-09-28 RX ORDER — MECLIZINE HYDROCHLORIDE 25 MG/1
25 TABLET ORAL
Qty: 90 TABLET | Refills: 1 | Status: SHIPPED | OUTPATIENT
Start: 2022-09-28 | End: 2022-11-28 | Stop reason: SDUPTHER

## 2022-09-28 RX ORDER — LEVOTHYROXINE SODIUM 100 UG/1
100 TABLET ORAL
Qty: 90 TABLET | Refills: 1 | Status: SHIPPED | OUTPATIENT
Start: 2022-09-28 | End: 2023-03-29 | Stop reason: SDUPTHER

## 2022-09-28 SDOH — SOCIAL DETERMINANTS OF HEALTH (SDOH): PROBLEMS IN RELATIONSHIP WITH SPOUSE OR PARTNER: Z63.0
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
## 2022-10-09 ENCOUNTER — HOSPITAL ENCOUNTER (EMERGENCY)
Facility: HOSPITAL | Age: 77
Discharge: HOME | End: 2022-10-09
Attending: EMERGENCY MEDICINE
Payer: MEDICARE

## 2022-10-09 VITALS
BODY MASS INDEX: 30.16 KG/M2 | OXYGEN SATURATION: 97 % | SYSTOLIC BLOOD PRESSURE: 126 MMHG | HEART RATE: 68 BPM | DIASTOLIC BLOOD PRESSURE: 68 MMHG | WEIGHT: 181 LBS | HEIGHT: 65 IN | TEMPERATURE: 99 F | RESPIRATION RATE: 18 BRPM

## 2022-10-09 DIAGNOSIS — R53.1 WEAKNESS: ICD-10-CM

## 2022-10-09 LAB
ALBUMIN SERPL BCP-MCNC: 3.8 G/DL (ref 3.5–5.2)
ALP SERPL-CCNC: 39 U/L (ref 55–135)
ALT SERPL W/O P-5'-P-CCNC: 21 U/L (ref 10–44)
ANION GAP SERPL CALC-SCNC: 14 MMOL/L (ref 8–16)
AST SERPL-CCNC: 34 U/L (ref 10–40)
BASOPHILS # BLD AUTO: 0.01 K/UL (ref 0–0.2)
BASOPHILS NFR BLD: 0.1 % (ref 0–1.9)
BILIRUB SERPL-MCNC: 1 MG/DL (ref 0.1–1)
BUN SERPL-MCNC: 23 MG/DL (ref 8–23)
CALCIUM SERPL-MCNC: 9.3 MG/DL (ref 8.7–10.5)
CHLORIDE SERPL-SCNC: 105 MMOL/L (ref 95–110)
CO2 SERPL-SCNC: 21 MMOL/L (ref 23–29)
CREAT SERPL-MCNC: 1.2 MG/DL (ref 0.5–1.4)
DIFFERENTIAL METHOD: ABNORMAL
EOSINOPHIL # BLD AUTO: 0 K/UL (ref 0–0.5)
EOSINOPHIL NFR BLD: 0 % (ref 0–8)
ERYTHROCYTE [DISTWIDTH] IN BLOOD BY AUTOMATED COUNT: 12.5 % (ref 11.5–14.5)
EST. GFR  (NO RACE VARIABLE): >60 ML/MIN/1.73 M^2
GLUCOSE SERPL-MCNC: 80 MG/DL (ref 70–110)
HCT VFR BLD AUTO: 41.1 % (ref 40–54)
HGB BLD-MCNC: 13.9 G/DL (ref 14–18)
IMM GRANULOCYTES # BLD AUTO: 0.03 K/UL (ref 0–0.04)
IMM GRANULOCYTES NFR BLD AUTO: 0.3 % (ref 0–0.5)
LYMPHOCYTES # BLD AUTO: 0.7 K/UL (ref 1–4.8)
LYMPHOCYTES NFR BLD: 7 % (ref 18–48)
MCH RBC QN AUTO: 32.3 PG (ref 27–31)
MCHC RBC AUTO-ENTMCNC: 33.8 G/DL (ref 32–36)
MCV RBC AUTO: 95 FL (ref 82–98)
MONOCYTES # BLD AUTO: 1.1 K/UL (ref 0.3–1)
MONOCYTES NFR BLD: 11.5 % (ref 4–15)
NEUTROPHILS # BLD AUTO: 7.6 K/UL (ref 1.8–7.7)
NEUTROPHILS NFR BLD: 81.1 % (ref 38–73)
NRBC BLD-RTO: 0 /100 WBC
PLATELET # BLD AUTO: 227 K/UL (ref 150–450)
PMV BLD AUTO: 9.2 FL (ref 9.2–12.9)
POTASSIUM SERPL-SCNC: 4.4 MMOL/L (ref 3.5–5.1)
PROT SERPL-MCNC: 7.1 G/DL (ref 6–8.4)
RBC # BLD AUTO: 4.31 M/UL (ref 4.6–6.2)
SODIUM SERPL-SCNC: 140 MMOL/L (ref 136–145)
WBC # BLD AUTO: 9.41 K/UL (ref 3.9–12.7)

## 2022-10-09 PROCEDURE — 80053 COMPREHEN METABOLIC PANEL: CPT | Performed by: EMERGENCY MEDICINE

## 2022-10-09 PROCEDURE — 36415 COLL VENOUS BLD VENIPUNCTURE: CPT | Performed by: EMERGENCY MEDICINE

## 2022-10-09 PROCEDURE — 93010 EKG 12-LEAD: ICD-10-PCS | Mod: ,,, | Performed by: SPECIALIST

## 2022-10-09 PROCEDURE — 93005 ELECTROCARDIOGRAM TRACING: CPT

## 2022-10-09 PROCEDURE — 25000003 PHARM REV CODE 250: Performed by: EMERGENCY MEDICINE

## 2022-10-09 PROCEDURE — 93010 ELECTROCARDIOGRAM REPORT: CPT | Mod: ,,, | Performed by: SPECIALIST

## 2022-10-09 PROCEDURE — 85025 COMPLETE CBC W/AUTO DIFF WBC: CPT | Performed by: EMERGENCY MEDICINE

## 2022-10-09 PROCEDURE — 99284 EMERGENCY DEPT VISIT MOD MDM: CPT | Mod: 25

## 2022-10-09 RX ORDER — MECLIZINE HYDROCHLORIDE 25 MG/1
25 TABLET ORAL
Status: COMPLETED | OUTPATIENT
Start: 2022-10-09 | End: 2022-10-09

## 2022-10-09 RX ADMIN — MECLIZINE HYDROCHLORIDE 25 MG: 25 TABLET ORAL at 08:10

## 2022-10-10 NOTE — ED PROVIDER NOTES
Encounter Date: 10/9/2022       History     Chief Complaint   Patient presents with    Fall     Unwitnessed fall unknown if there was LOC.     76-year-old male with vertigo presents to the ER after an episode of dizziness today.  He states he fell but has no injury.  He was too weak to get up off the ground.  His wife had to call an ambulance.  His dizziness is improved at this time.  Began after he moved his head suddenly.  He denies recent illness.  His chief complaint at this time is a sensation of significant fatigue.  He has no chest pain or shortness of breath no headache no neck pain no injury from the fall.  No recent illness no bloody stools.  No difficulty speaking or swallowing.    The history is provided by the patient.   Review of patient's allergies indicates:   Allergen Reactions    Iodinated contrast media     Iodine      Other reaction(s): in XRAY CONTRAST --swelling    Iodine and iodide containing products     Pentothal [thiopental sodium]      Trouble waking up, went into a deep sleep     Past Medical History:   Diagnosis Date    Anticoagulant long-term use     Arthritis     COPD (chronic obstructive pulmonary disease)     Coronary artery disease     Fx     RIBS    Hearing loss     LEFT EAR AID; RIGHT EAR OTC DEVICE    Heart attack     Hypertension     Thyroid disease     Vertigo      Past Surgical History:   Procedure Laterality Date    bilateral hammer toe      X 5    CARDIAC SURGERY      bypass X 3, 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  Social History     Tobacco Use    Smoking status: Former    Smokeless tobacco: Never   Substance  Use Topics    Alcohol use: Yes     Comment: daily boubon and cola    Drug use: No     Review of Systems   Constitutional:  Negative for fever.   HENT:  Negative for sore throat.    Respiratory:  Negative for shortness of breath.    Cardiovascular:  Negative for chest pain.   Gastrointestinal:  Negative for nausea.   Genitourinary:  Negative for dysuria.   Musculoskeletal:  Negative for back pain.   Skin:  Negative for rash.   Neurological:  Negative for weakness.        Vertigo     All other systems reviewed and are negative.    Physical Exam     Initial Vitals   BP Pulse Resp Temp SpO2   10/09/22 1917 10/09/22 1917 10/09/22 1917 10/09/22 1918 10/09/22 1917   128/85 86 20 98.5 °F (36.9 °C) 96 %      MAP       --                Physical Exam    Nursing note and vitals reviewed.  Constitutional: He appears well-developed and well-nourished. He is not diaphoretic.  Non-toxic appearance. He does not have a sickly appearance. He does not appear ill. No distress.   [place]   HENT:   Head: Normocephalic and atraumatic.   Eyes: EOM are normal.   Neck: Neck supple.   Normal range of motion.  Cardiovascular:  Normal rate, regular rhythm and normal heart sounds.     Exam reveals no gallop and no friction rub.       No murmur heard.  Pulmonary/Chest: Breath sounds normal. No respiratory distress. He has no wheezes. He has no rhonchi. He has no rales.   Abdominal: Abdomen is soft. He exhibits no distension. There is no abdominal tenderness. There is no rebound.   Musculoskeletal:         General: Normal range of motion.      Cervical back: Normal range of motion and neck supple. No rigidity. Normal range of motion.     Neurological: He is alert and oriented to person, place, and time.   Normal FNF   Skin: Skin is warm and dry. No rash noted.   Psychiatric: He has a normal mood and affect. His behavior is normal. Judgment and thought content normal.       ED Course   Procedures  Labs Reviewed   CBC W/ AUTO DIFFERENTIAL - Abnormal;  "Notable for the following components:       Result Value    RBC 4.31 (*)     Hemoglobin 13.9 (*)     MCH 32.3 (*)     Lymph # 0.7 (*)     Mono # 1.1 (*)     Gran % 81.1 (*)     Lymph % 7.0 (*)     All other components within normal limits   COMPREHENSIVE METABOLIC PANEL - Abnormal; Notable for the following components:    CO2 21 (*)     Alkaline Phosphatase 39 (*)     All other components within normal limits          Imaging Results    None          Medications   meclizine tablet 25 mg (25 mg Oral Given 10/9/22 2046)     Medical Decision Making:   History:   I obtained history from: someone other than patient.  Clinical Tests:   Lab Tests: Reviewed and Ordered           ED Course as of 10/09/22 2225   Sun Oct 09, 2022   1940 BP: 128/85 [EF]   1940 Temp: 98.5 °F (36.9 °C) [EF]   1940 Temp src: Oral [EF]   1940 Pulse: 86 [EF]   1940 Resp: 20 [EF]   1940 SpO2: 96 % [EF]   1959 EKG sinus rhythm 1st degree AV block normal axis 82 beats per minute left bundle no ST elevation T-wave inversion lead 1 aVL V5 V6.  EKG is unchanged from prior EKG.  Independently interpreted [EF]   2018 WBC: 9.41 [EF]   2018 Hemoglobin(!): 13.9 [EF]   2018 Platelets: 227 [EF]   2049 Wife reports etoh at home, that is why he had trouble walking.  She thinks he has adult autism and that he is "a lot to handle at home." She would like for me to refer her to someone who can discuss this possible diagnosis of adult autism. [EF]   2137 76-year-old male presents to the ER for an episode of dizziness and a fall.  Wife arrives to the ER later and reports he was drinking alcohol.  No headache.  No head injury.  She reports that he can be difficult at home and thinks that he has adult autism.  She reports trying to discuss this with primary care but without any satisfactory resolution.  Will DC from ER.  Wife wants to speak to case management about her .  She states that she has brought up her concerns to primary care but they have not been " adequately addressed.  I did forward his information to case management.  [EF]      ED Course User Index  [EF] Gonzalez Del Castillo MD                 Clinical Impression:   Final diagnoses:  [R53.1] Weakness        ED Disposition Condition    Discharge Stable          ED Prescriptions    None       Follow-up Information       Follow up With Specialties Details Why Contact Info    [facility] Emergency Dept Emergency Medicine  As needed, If symptoms worsen [address] [place] [phone]    [facility], Psychiatry Schedule an appointment as soon as possible for a visit  alcohol abuse [address] [place] [id number]  [phone]      [facility] Psychiatry  for adult autism [address] [place] [id number] [phone]             Gonzalez Del Castillo MD  10/09/22 4790

## 2022-10-10 NOTE — ED NOTES
Pt states he had been drinking and was enjoying himself. He had lost his balance and fell. He denies hitting his head or LOC.

## 2022-10-10 NOTE — ED NOTES
Pt able to stand and walk with minimal assistance. Pt states he no longer feels dizzy. Pt wheeled to toilet, able to use RR without assistance. Pt AAOx4, Abc's intact. NADN. No adverse reaction to medication given. D/C instructions  in pt possession along with belongings. No other needs at this time.
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
## 2022-10-12 ENCOUNTER — TELEPHONE (OUTPATIENT)
Dept: FAMILY MEDICINE | Facility: CLINIC | Age: 77
End: 2022-10-12

## 2022-10-12 NOTE — TELEPHONE ENCOUNTER
----- Message from Patience Marcum sent at 10/12/2022  1:22 PM CDT -----  Regarding: appointment  Pt needs a earlier appointment with doctor Raul needs referral for mental health  please call Alyssa at [phone].

## 2022-11-28 DIAGNOSIS — R42 VERTIGO: ICD-10-CM

## 2022-11-28 RX ORDER — MECLIZINE HYDROCHLORIDE 25 MG/1
25 TABLET ORAL
Qty: 90 TABLET | Refills: 1 | Status: SHIPPED | OUTPATIENT
Start: 2022-11-28 | End: 2023-03-29 | Stop reason: SDUPTHER

## 2022-11-28 NOTE — TELEPHONE ENCOUNTER
----- Message from Alisha Wright sent at 11/28/2022  4:37 PM CST -----    4:27   Refill on his Vertigo medication. # 90 [facility] pt's # [phone] GH

## 2023-03-16 ENCOUNTER — TELEPHONE (OUTPATIENT)
Dept: FAMILY MEDICINE | Facility: CLINIC | Age: 78
End: 2023-03-16

## 2023-03-16 NOTE — TELEPHONE ENCOUNTER
Spoke to Alyssa, wife, that fasting lab and urine are due a week prior to visit, encouraged water.

## 2023-03-29 ENCOUNTER — OFFICE VISIT (OUTPATIENT)
Dept: FAMILY MEDICINE | Facility: CLINIC | Age: 78
End: 2023-03-29
Payer: MEDICARE

## 2023-03-29 VITALS
HEIGHT: 64 IN | SYSTOLIC BLOOD PRESSURE: 120 MMHG | DIASTOLIC BLOOD PRESSURE: 74 MMHG | WEIGHT: 169 LBS | HEART RATE: 70 BPM | BODY MASS INDEX: 28.85 KG/M2

## 2023-03-29 DIAGNOSIS — I10 HYPERTENSION, UNSPECIFIED TYPE: ICD-10-CM

## 2023-03-29 DIAGNOSIS — G62.9 NEUROPATHY: ICD-10-CM

## 2023-03-29 DIAGNOSIS — E78.2 MIXED HYPERLIPIDEMIA: ICD-10-CM

## 2023-03-29 DIAGNOSIS — G31.84 MILD COGNITIVE IMPAIRMENT: ICD-10-CM

## 2023-03-29 DIAGNOSIS — M17.32 POST-TRAUMATIC OSTEOARTHRITIS OF LEFT KNEE: ICD-10-CM

## 2023-03-29 DIAGNOSIS — E03.9 ACQUIRED HYPOTHYROIDISM: ICD-10-CM

## 2023-03-29 DIAGNOSIS — G89.29 CHRONIC LEFT SHOULDER PAIN: Primary | ICD-10-CM

## 2023-03-29 DIAGNOSIS — R42 VERTIGO: ICD-10-CM

## 2023-03-29 DIAGNOSIS — H90.0 CONDUCTIVE HEARING LOSS, BILATERAL: ICD-10-CM

## 2023-03-29 DIAGNOSIS — Z96.652 S/P TOTAL KNEE ARTHROPLASTY, LEFT: ICD-10-CM

## 2023-03-29 DIAGNOSIS — M25.512 CHRONIC LEFT SHOULDER PAIN: Primary | ICD-10-CM

## 2023-03-29 DIAGNOSIS — I25.10 CORONARY ARTERY DISEASE INVOLVING NATIVE HEART WITHOUT ANGINA PECTORIS, UNSPECIFIED VESSEL OR LESION TYPE: ICD-10-CM

## 2023-03-29 DIAGNOSIS — Z95.1 HISTORY OF CORONARY ARTERY BYPASS GRAFT: ICD-10-CM

## 2023-03-29 PROCEDURE — 3288F FALL RISK ASSESSMENT DOCD: CPT | Mod: CPTII,S$GLB,, | Performed by: FAMILY MEDICINE

## 2023-03-29 PROCEDURE — 1159F PR MEDICATION LIST DOCUMENTED IN MEDICAL RECORD: ICD-10-PCS | Mod: CPTII,S$GLB,, | Performed by: FAMILY MEDICINE

## 2023-03-29 PROCEDURE — 3288F PR FALLS RISK ASSESSMENT DOCUMENTED: ICD-10-PCS | Mod: CPTII,S$GLB,, | Performed by: FAMILY MEDICINE

## 2023-03-29 PROCEDURE — 1101F PT FALLS ASSESS-DOCD LE1/YR: CPT | Mod: CPTII,S$GLB,, | Performed by: FAMILY MEDICINE

## 2023-03-29 PROCEDURE — 3078F DIAST BP <80 MM HG: CPT | Mod: CPTII,S$GLB,, | Performed by: FAMILY MEDICINE

## 2023-03-29 PROCEDURE — 1101F PR PT FALLS ASSESS DOC 0-1 FALLS W/OUT INJ PAST YR: ICD-10-PCS | Mod: CPTII,S$GLB,, | Performed by: FAMILY MEDICINE

## 2023-03-29 PROCEDURE — 3074F SYST BP LT 130 MM HG: CPT | Mod: CPTII,S$GLB,, | Performed by: FAMILY MEDICINE

## 2023-03-29 PROCEDURE — 3074F PR MOST RECENT SYSTOLIC BLOOD PRESSURE < 130 MM HG: ICD-10-PCS | Mod: CPTII,S$GLB,, | Performed by: FAMILY MEDICINE

## 2023-03-29 PROCEDURE — 99214 OFFICE O/P EST MOD 30 MIN: CPT | Mod: S$GLB,,, | Performed by: FAMILY MEDICINE

## 2023-03-29 PROCEDURE — 3078F PR MOST RECENT DIASTOLIC BLOOD PRESSURE < 80 MM HG: ICD-10-PCS | Mod: CPTII,S$GLB,, | Performed by: FAMILY MEDICINE

## 2023-03-29 PROCEDURE — 1159F MED LIST DOCD IN RCRD: CPT | Mod: CPTII,S$GLB,, | Performed by: FAMILY MEDICINE

## 2023-03-29 PROCEDURE — 99214 PR OFFICE/OUTPT VISIT, EST, LEVL IV, 30-39 MIN: ICD-10-PCS | Mod: S$GLB,,, | Performed by: FAMILY MEDICINE

## 2023-03-29 RX ORDER — DONEPEZIL HYDROCHLORIDE 5 MG/1
5 TABLET, FILM COATED ORAL NIGHTLY
Qty: 30 TABLET | Refills: 5 | Status: SHIPPED | OUTPATIENT
Start: 2023-03-29 | End: 2023-11-14 | Stop reason: SDUPTHER

## 2023-03-29 RX ORDER — ATORVASTATIN CALCIUM 20 MG/1
20 TABLET, FILM COATED ORAL DAILY
Qty: 90 TABLET | Refills: 3 | Status: SHIPPED | OUTPATIENT
Start: 2023-03-29

## 2023-03-29 RX ORDER — LEVOTHYROXINE SODIUM 100 UG/1
100 TABLET ORAL
Qty: 90 TABLET | Refills: 3 | Status: SHIPPED | OUTPATIENT
Start: 2023-03-29 | End: 2024-01-10 | Stop reason: SDUPTHER

## 2023-03-29 RX ORDER — CLOPIDOGREL BISULFATE 75 MG/1
75 TABLET ORAL DAILY
Qty: 90 TABLET | Refills: 3 | Status: SHIPPED | OUTPATIENT
Start: 2023-03-29 | End: 2023-08-10 | Stop reason: SDUPTHER

## 2023-03-29 RX ORDER — TRAMADOL HYDROCHLORIDE AND ACETAMINOPHEN 37.5; 325 MG/1; MG/1
1 TABLET, FILM COATED ORAL 2 TIMES DAILY
Qty: 40 TABLET | Refills: 0 | Status: SHIPPED | OUTPATIENT
Start: 2023-03-29 | End: 2023-07-27 | Stop reason: SDUPTHER

## 2023-03-29 RX ORDER — MECLIZINE HYDROCHLORIDE 25 MG/1
25 TABLET ORAL
Qty: 90 TABLET | Refills: 1 | Status: SHIPPED | OUTPATIENT
Start: 2023-03-29 | End: 2023-10-04 | Stop reason: SDUPTHER

## 2023-03-29 NOTE — PROGRESS NOTES
"  SUBJECTIVE:    Patient ID: Mor Hill is a 77 y.o. male.    Chief Complaint: Hypertension (Brought bottles, need refills, left knee and leg cramps, and pain, multiple joins pain, deshawn problems, abc )    Patient ID: Mor Hill is a 77 y.o. male.     Chief Complaint: Hypertension (Brought bottles, need refills, left knee and leg cramps, and pain, multiple joins pain, deshawn problems, abc )     76 yo M who served in the [employer], [employer] and [employer] comes in complaining of "getting old" with diffuse arthritic pain in his knees bilaterally, ankles bilaterally, hands bilaterally as well as bilateral torn rotator cuffs. He is s/p L TKR with Dr. Linda which required revisions and multiple shoulder surgeries bilaterally. He takes ibuprofen for his pains with some relief.     Pt presented to the ED in October 2022 due to vertigo which was treated with meclizine. He is compliant with Rx and has had no episodes since.      He is very hard of hearing and [facility].      Pt is s/p triple CABG with Dr. Andrew and follows with Dr. Reyes     Colonscopy 11/2021 with Dr. Ortega RTC in 3 yr.      Review of Systems   Constitutional:  Negative for appetite change, chills, fatigue, fever and unexpected weight change.   HENT:  Negative for nasal congestion, ear pain and trouble swallowing.    Eyes:  Negative for pain, discharge and visual disturbance.   Respiratory:  Negative for apnea, cough, shortness of breath and wheezing.    Cardiovascular:  Negative for chest pain and leg swelling.   Gastrointestinal:  Positive for reflux (controlled with Rx). Negative for abdominal pain, blood in stool, constipation, diarrhea, nausea and vomiting.   Endocrine: Negative for cold intolerance, heat intolerance and polydipsia.   Genitourinary:  Negative for dysuria, hematuria, testicular pain and urgency.   Musculoskeletal:  Positive for arthralgias, leg pain and myalgias. Negative for gait " problem and joint swelling.   Neurological:  Positive for vertigo (controlled with meclizine). Negative for dizziness, seizures and numbness.   Psychiatric/Behavioral:  Negative for agitation, behavioral problems and hallucinations. The patient is not nervous/anxious.       Objective      Physical Exam  Vitals and nursing note reviewed.   Constitutional:       Appearance: He is well-developed.   HENT:      Head: Normocephalic and atraumatic.      Right Ear: External ear normal.      Left Ear: External ear normal.      Nose: Nose normal.   Eyes:      Pupils: Pupils are equal, round, and reactive to light.   Neck:      Thyroid: No thyromegaly.      Vascular: No carotid bruit.   Cardiovascular:      Rate and Rhythm: Normal rate and regular rhythm.      Heart sounds: Normal heart sounds. No murmur heard.  Pulmonary:      Effort: Pulmonary effort is normal.      Breath sounds: Normal breath sounds. No wheezing or rales.   Abdominal:      General: Bowel sounds are normal. There is no distension.      Palpations: Abdomen is soft.      Tenderness: There is no abdominal tenderness.   Musculoskeletal:         General: No tenderness or deformity. Normal range of motion.      Cervical back: Normal range of motion and neck supple.      Lumbar back: Normal. No spasms.      Comments: Bends 90 degrees at  waist     Abducts R arm fully, L arm some difficulty with abduction due to pain   Lymphadenopathy:      Cervical: No cervical adenopathy.   Skin:     General: Skin is warm and dry.      Findings: No rash.   Neurological:      Mental Status: He is alert and oriented to person, place, and time.      Cranial Nerves: No cranial nerve deficit.      Coordination: Coordination normal.      Comments: MMSE of 28/30  Word Recall 2/3   Psychiatric:         Behavior: Behavior normal.         Thought Content: Thought content normal.         Judgment: Judgment normal.                      Admission on 10/09/2022, Discharged on 10/09/2022    Component Date Value Ref Range Status    WBC 10/09/2022 9.41  3.90 - 12.70 K/uL Final    RBC 10/09/2022 4.31 (L)  4.60 - 6.20 M/uL Final    Hemoglobin 10/09/2022 13.9 (L)  14.0 - 18.0 g/dL Final    Hematocrit 10/09/2022 41.1  40.0 - 54.0 % Final    MCV 10/09/2022 95  82 - 98 fL Final    MCH 10/09/2022 32.3 (H)  27.0 - 31.0 pg Final    MCHC 10/09/2022 33.8  32.0 - 36.0 g/dL Final    RDW 10/09/2022 12.5  11.5 - 14.5 % Final    Platelets 10/09/2022 227  150 - 450 K/uL Final    MPV 10/09/2022 9.2  9.2 - 12.9 fL Final    Immature Granulocytes 10/09/2022 0.3  0.0 - 0.5 % Final    Gran # (ANC) 10/09/2022 7.6  1.8 - 7.7 K/uL Final    Immature Grans (Abs) 10/09/2022 0.03  0.00 - 0.04 K/uL Final    Lymph # 10/09/2022 0.7 (L)  1.0 - 4.8 K/uL Final    Mono # 10/09/2022 1.1 (H)  0.3 - 1.0 K/uL Final    Eos # 10/09/2022 0.0  0.0 - 0.5 K/uL Final    Baso # 10/09/2022 0.01  0.00 - 0.20 K/uL Final    nRBC 10/09/2022 0  0 /100 WBC Final    Gran % 10/09/2022 81.1 (H)  38.0 - 73.0 % Final    Lymph % 10/09/2022 7.0 (L)  18.0 - 48.0 % Final    Mono % 10/09/2022 11.5  4.0 - 15.0 % Final    Eosinophil % 10/09/2022 0.0  0.0 - 8.0 % Final    Basophil % 10/09/2022 0.1  0.0 - 1.9 % Final    Differential Method 10/09/2022 Automated   Final    Sodium 10/09/2022 140  136 - 145 mmol/L Final    Potassium 10/09/2022 4.4  3.5 - 5.1 mmol/L Final    Chloride 10/09/2022 105  95 - 110 mmol/L Final    CO2 10/09/2022 21 (L)  23 - 29 mmol/L Final    Glucose 10/09/2022 80  70 - 110 mg/dL Final    BUN 10/09/2022 23  8 - 23 mg/dL Final    Creatinine 10/09/2022 1.2  0.5 - 1.4 mg/dL Final    Calcium 10/09/2022 9.3  8.7 - 10.5 mg/dL Final    Total Protein 10/09/2022 7.1  6.0 - 8.4 g/dL Final    Albumin 10/09/2022 3.8  3.5 - 5.2 g/dL Final    Total Bilirubin 10/09/2022 1.0  0.1 - 1.0 mg/dL Final    Alkaline Phosphatase 10/09/2022 39 (L)  55 - 135 U/L Final    AST 10/09/2022 34  10 - 40 U/L Final    ALT 10/09/2022 21  10 - 44 U/L Final    Anion Gap 10/09/2022 14   8 - 16 mmol/L Final    eGFR 10/09/2022 >60  >60 mL/min/1.73 m^2 Final       Past Medical History:   Diagnosis Date    Anticoagulant long-term use     Arthritis     COPD (chronic obstructive pulmonary disease)     Coronary artery disease     Fx     RIBS    Hearing loss     LEFT EAR AID; RIGHT EAR OTC DEVICE    Heart attack     Hypertension     Thyroid disease     Vertigo      Social History     Socioeconomic History    Marital status:    Tobacco Use    Smoking status: Former    Smokeless tobacco: Never   Substance and Sexual Activity    Alcohol use: Yes     Comment: daily boubon and cola    Drug use: No     Past Surgical History:   Procedure Laterality Date    bilateral hammer toe      X 5    CARDIAC SURGERY      bypass X 3, 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    Review of patient's allergies indicates:   Allergen Reactions    Iodinated contrast media     Iodine      Other reaction(s): in XRAY CONTRAST --swelling    Iodine and iodide containing products     Pentothal [thiopental sodium]      Trouble waking up, went into a deep sleep       Current Outpatient Medications:     atenoloL (TENORMIN) 25 MG tablet, Take 1 tablet (25 mg total) by mouth every evening., Disp: 90 tablet, Rfl: 1    cyclobenzaprine (FLEXERIL) 10 MG tablet, Take 1 tablet (10 mg total) by mouth 2 (two) times daily as needed., Disp: 60 tablet, Rfl: 2    erythromycin (ROMYCIN) ophthalmic ointment, Place a 1/2 inch ribbon of ointment into right the lower eyelid twice daily for 3 days., Disp: 3.5 g, Rfl: 0    furosemide (LASIX) 20 MG tablet, Take 20 mg by mouth once daily., Disp: ,  "Rfl:     ibuprofen (ADVIL,MOTRIN) 200 MG tablet, Take 2 tablets by mouth., Disp: , Rfl:     loratadine (CLARITIN) 10 mg tablet, Take 10 mg by mouth once daily., Disp: , Rfl:     omeprazole (PRILOSEC) 20 MG capsule, Take 1 capsule (20 mg total) by mouth once daily., Disp: 90 capsule, Rfl: 3    phenylephrine HCl/acetaminophn (SINUS RELIEF, NON-DROWSY, ORAL), Take by mouth., Disp: , Rfl:     atorvastatin (LIPITOR) 20 MG tablet, Take 1 tablet (20 mg total) by mouth once daily., Disp: 90 tablet, Rfl: 3    clopidogreL (PLAVIX) 75 mg tablet, Take 1 tablet (75 mg total) by mouth once daily., Disp: 90 tablet, Rfl: 3    donepeziL (ARICEPT) 5 MG tablet, Take 1 tablet (5 mg total) by mouth every evening., Disp: 30 tablet, Rfl: 5    levothyroxine (SYNTHROID) 100 MCG tablet, Take 1 tablet (100 mcg total) by mouth before breakfast., Disp: 90 tablet, Rfl: 3    meclizine (ANTIVERT) 25 mg tablet, Take 1 tablet (25 mg total) by mouth as needed., Disp: 90 tablet, Rfl: 1    tramadol-acetaminophen 37.5-325 mg (ULTRACET) 37.5-325 mg Tab, Take 1 tablet by mouth 2 (two) times a day., Disp: 40 tablet, Rfl: 0    Review of Systems        Objective:      Vitals:    03/29/23 1437   BP: 120/74   Pulse: 70   Weight: 76.7 kg (169 lb)   Height: 5' 4" (1.626 m)     Physical Exam      Assessment:       1. Chronic left shoulder pain    2. Acquired hypothyroidism    3. Coronary artery disease involving native heart without angina pectoris, unspecified vessel or lesion type    4. Vertigo    5. Mixed hyperlipidemia    6. Post-traumatic osteoarthritis of left knee    7. Mild cognitive impairment    8. Neuropathy    9. Conductive hearing loss, bilateral    10. History of coronary artery bypass graft    11. Hypertension, unspecified type    12. S/P total knee arthroplasty, left           Plan:       Chronic left shoulder pain  Will do a trial of Tramacet 1 p.o. b.i.d. for severe joint pain.  Acquired hypothyroidism  Comments:  Currently well managed on " Synthroid 100mcg. Refills sent today.   Orders:  -     levothyroxine (SYNTHROID) 100 MCG tablet; Take 1 tablet (100 mcg total) by mouth before breakfast.  Dispense: 90 tablet; Refill: 3    Coronary artery disease involving native heart without angina pectoris, unspecified vessel or lesion type  -     clopidogreL (PLAVIX) 75 mg tablet; Take 1 tablet (75 mg total) by mouth once daily.  Dispense: 90 tablet; Refill: 3  Continue Plavix daily  Vertigo  -     meclizine (ANTIVERT) 25 mg tablet; Take 1 tablet (25 mg total) by mouth as needed.  Dispense: 90 tablet; Refill: 1  Needs meclizine for dizziness  Mixed hyperlipidemia  -     atorvastatin (LIPITOR) 20 MG tablet; Take 1 tablet (20 mg total) by mouth once daily.  Dispense: 90 tablet; Refill: 3  Cholesterol 141 HDL 31  LDL 89 excellent lipid panel  Post-traumatic osteoarthritis of left knee  -     tramadol-acetaminophen 37.5-325 mg (ULTRACET) 37.5-325 mg Tab; Take 1 tablet by mouth 2 (two) times a day.  Dispense: 40 tablet; Refill: 0    Mild cognitive impairment  -     donepeziL (ARICEPT) 5 MG tablet; Take 1 tablet (5 mg total) by mouth every evening.  Dispense: 30 tablet; Refill: 5  Mini-mental status exam scored 28/30, he would like to take something for memory.  Will start donepezil  Neuropathy    Conductive hearing loss, bilateral  [facility]  History of coronary artery bypass graft    Hypertension, unspecified type  Blood pressure well controlled recently  S/P total knee arthroplasty, left      No follow-ups on file.        3/29/2023 Ayden Carter

## 2023-03-31 LAB
ALBUMIN SERPL-MCNC: 3.8 G/DL (ref 3.6–5.1)
ALBUMIN/GLOB SERPL: 1.3 (CALC) (ref 1–2.5)
ALP SERPL-CCNC: 37 U/L (ref 35–144)
ALT SERPL-CCNC: 18 U/L (ref 9–46)
APPEARANCE UR: CLEAR
AST SERPL-CCNC: 19 U/L (ref 10–35)
BACTERIA #/AREA URNS HPF: ABNORMAL /HPF
BACTERIA UR CULT: ABNORMAL
BACTERIA UR CULT: ABNORMAL
BASOPHILS # BLD AUTO: 59 CELLS/UL (ref 0–200)
BASOPHILS NFR BLD AUTO: 0.9 %
BILIRUB SERPL-MCNC: 0.8 MG/DL (ref 0.2–1.2)
BILIRUB UR QL STRIP: NEGATIVE
BUN SERPL-MCNC: 24 MG/DL (ref 7–25)
BUN/CREAT SERPL: 18 (CALC) (ref 6–22)
CALCIUM SERPL-MCNC: 9.1 MG/DL (ref 8.6–10.3)
CHLORIDE SERPL-SCNC: 105 MMOL/L (ref 98–110)
CHOLEST SERPL-MCNC: 141 MG/DL
CHOLEST/HDLC SERPL: 4.5 (CALC)
CO2 SERPL-SCNC: 28 MMOL/L (ref 20–32)
COLOR UR: YELLOW
CREAT SERPL-MCNC: 1.32 MG/DL (ref 0.7–1.28)
EGFR: 56 ML/MIN/1.73M2
EOSINOPHIL # BLD AUTO: 218 CELLS/UL (ref 15–500)
EOSINOPHIL NFR BLD AUTO: 3.3 %
ERYTHROCYTE [DISTWIDTH] IN BLOOD BY AUTOMATED COUNT: 12.6 % (ref 11–15)
GLOBULIN SER CALC-MCNC: 2.9 G/DL (CALC) (ref 1.9–3.7)
GLUCOSE SERPL-MCNC: 96 MG/DL (ref 65–99)
GLUCOSE UR QL STRIP: NEGATIVE
HCT VFR BLD AUTO: 42.1 % (ref 38.5–50)
HDLC SERPL-MCNC: 31 MG/DL
HGB BLD-MCNC: 13.7 G/DL (ref 13.2–17.1)
HGB UR QL STRIP: NEGATIVE
HYALINE CASTS #/AREA URNS LPF: ABNORMAL /LPF
KETONES UR QL STRIP: NEGATIVE
LDLC SERPL CALC-MCNC: 89 MG/DL (CALC)
LEUKOCYTE ESTERASE UR QL STRIP: ABNORMAL
LYMPHOCYTES # BLD AUTO: 2119 CELLS/UL (ref 850–3900)
LYMPHOCYTES NFR BLD AUTO: 32.1 %
MCH RBC QN AUTO: 31.3 PG (ref 27–33)
MCHC RBC AUTO-ENTMCNC: 32.5 G/DL (ref 32–36)
MCV RBC AUTO: 96.1 FL (ref 80–100)
MONOCYTES # BLD AUTO: 653 CELLS/UL (ref 200–950)
MONOCYTES NFR BLD AUTO: 9.9 %
NEUTROPHILS # BLD AUTO: 3551 CELLS/UL (ref 1500–7800)
NEUTROPHILS NFR BLD AUTO: 53.8 %
NITRITE UR QL STRIP: NEGATIVE
NONHDLC SERPL-MCNC: 110 MG/DL (CALC)
PH UR STRIP: 5.5 [PH] (ref 5–8)
PLATELET # BLD AUTO: 417 THOUSAND/UL (ref 140–400)
PMV BLD REES-ECKER: 9.3 FL (ref 7.5–12.5)
POTASSIUM SERPL-SCNC: 4.3 MMOL/L (ref 3.5–5.3)
PROT SERPL-MCNC: 6.7 G/DL (ref 6.1–8.1)
PROT UR QL STRIP: NEGATIVE
RBC # BLD AUTO: 4.38 MILLION/UL (ref 4.2–5.8)
RBC #/AREA URNS HPF: ABNORMAL /HPF
SERVICE CMNT-IMP: ABNORMAL
SODIUM SERPL-SCNC: 141 MMOL/L (ref 135–146)
SP GR UR STRIP: 1.02 (ref 1–1.03)
SQUAMOUS #/AREA URNS HPF: ABNORMAL /HPF
TRIGL SERPL-MCNC: 109 MG/DL
TSH SERPL-ACNC: 1.57 MIU/L (ref 0.4–4.5)
WBC # BLD AUTO: 6.6 THOUSAND/UL (ref 3.8–10.8)
WBC #/AREA URNS HPF: ABNORMAL /HPF

## 2023-04-03 ENCOUNTER — TELEPHONE (OUTPATIENT)
Dept: FAMILY MEDICINE | Facility: CLINIC | Age: 78
End: 2023-04-03

## 2023-07-03 ENCOUNTER — TELEPHONE (OUTPATIENT)
Dept: FAMILY MEDICINE | Facility: CLINIC | Age: 78
End: 2023-07-03

## 2023-07-03 NOTE — TELEPHONE ENCOUNTER
----- Message from Shameka Horan sent at 7/3/2023  3:05 PM CDT -----  Patient wife Alyssa called and stated that she need to speak to someone because she need to get a certificate so she can put her  in a living facility because of his dementia she  does not understand what she has to do and she would like to have a doctor appointment he can  explain what she need to do please give her a call at [phone]

## 2023-07-03 NOTE — TELEPHONE ENCOUNTER
"Spoke with patients wife who states she needs more information in regards to getting him in a nursing facility. States he is worsening and becoming angry. She refused to schedule him for an appointment to discuss options, we have not seen him since March but she states he's not coming in. She keeps mentioning and wants an "explanation" from Dr. Carter of what is going on, "how long this is going to be drawn out". States he was told he has dementia but nothing was ever "explained" to them. Wife refused to discuss any further and just states she wants Dr. Carter to call her with an "explanation"   Printed.   "

## 2023-07-26 DIAGNOSIS — M17.32 POST-TRAUMATIC OSTEOARTHRITIS OF LEFT KNEE: ICD-10-CM

## 2023-07-26 NOTE — TELEPHONE ENCOUNTER
"Per Dr. Carter "call patient to set up office visit in August instead of October for stress management." Tried calling wife, phone just kept ringing and then started beeping.  "

## 2023-07-27 RX ORDER — TRAMADOL HYDROCHLORIDE AND ACETAMINOPHEN 37.5; 325 MG/1; MG/1
1 TABLET, FILM COATED ORAL 2 TIMES DAILY
Qty: 40 TABLET | Refills: 0 | Status: SHIPPED | OUTPATIENT
Start: 2023-07-27 | End: 2023-08-10 | Stop reason: SDUPTHER

## 2023-07-27 NOTE — TELEPHONE ENCOUNTER
Spoke with patient and got him scheduled. He also states he needs a refill on Tramadol. States he's been using this more flequently and wants to know if Dr. Carter can increase to daily - I let him know we can set up for refill and speak with Dr. Carter at the visit to see if he is okay with changing

## 2023-08-10 ENCOUNTER — OFFICE VISIT (OUTPATIENT)
Dept: FAMILY MEDICINE | Facility: CLINIC | Age: 78
End: 2023-08-10
Attending: FAMILY MEDICINE
Payer: MEDICARE

## 2023-08-10 VITALS
SYSTOLIC BLOOD PRESSURE: 124 MMHG | HEIGHT: 64 IN | BODY MASS INDEX: 29.02 KG/M2 | HEART RATE: 64 BPM | DIASTOLIC BLOOD PRESSURE: 70 MMHG | WEIGHT: 170 LBS

## 2023-08-10 DIAGNOSIS — E03.9 ACQUIRED HYPOTHYROIDISM: ICD-10-CM

## 2023-08-10 DIAGNOSIS — F91.3 MILD OPPOSITIONAL DEFIANT DISORDER WITH ARGUMENTATIVE OR DEFIANT BEHAVIOR: Primary | ICD-10-CM

## 2023-08-10 DIAGNOSIS — I25.10 CORONARY ARTERY DISEASE INVOLVING NATIVE HEART WITHOUT ANGINA PECTORIS, UNSPECIFIED VESSEL OR LESION TYPE: ICD-10-CM

## 2023-08-10 DIAGNOSIS — Z95.1 HISTORY OF CORONARY ARTERY BYPASS GRAFT: ICD-10-CM

## 2023-08-10 DIAGNOSIS — H90.0 CONDUCTIVE HEARING LOSS, BILATERAL: ICD-10-CM

## 2023-08-10 DIAGNOSIS — M17.32 POST-TRAUMATIC OSTEOARTHRITIS OF LEFT KNEE: ICD-10-CM

## 2023-08-10 DIAGNOSIS — Z96.652 S/P TOTAL KNEE ARTHROPLASTY, LEFT: ICD-10-CM

## 2023-08-10 DIAGNOSIS — Z63.0 MARITAL PROBLEMS: ICD-10-CM

## 2023-08-10 DIAGNOSIS — I10 HYPERTENSION, UNSPECIFIED TYPE: ICD-10-CM

## 2023-08-10 DIAGNOSIS — Z63.72: ICD-10-CM

## 2023-08-10 PROCEDURE — 1159F PR MEDICATION LIST DOCUMENTED IN MEDICAL RECORD: ICD-10-PCS | Mod: CPTII,S$GLB,, | Performed by: FAMILY MEDICINE

## 2023-08-10 PROCEDURE — 3074F SYST BP LT 130 MM HG: CPT | Mod: CPTII,S$GLB,, | Performed by: FAMILY MEDICINE

## 2023-08-10 PROCEDURE — 1101F PT FALLS ASSESS-DOCD LE1/YR: CPT | Mod: CPTII,S$GLB,, | Performed by: FAMILY MEDICINE

## 2023-08-10 PROCEDURE — 99214 OFFICE O/P EST MOD 30 MIN: CPT | Mod: S$GLB,,, | Performed by: FAMILY MEDICINE

## 2023-08-10 PROCEDURE — 3288F PR FALLS RISK ASSESSMENT DOCUMENTED: ICD-10-PCS | Mod: CPTII,S$GLB,, | Performed by: FAMILY MEDICINE

## 2023-08-10 PROCEDURE — 1159F MED LIST DOCD IN RCRD: CPT | Mod: CPTII,S$GLB,, | Performed by: FAMILY MEDICINE

## 2023-08-10 PROCEDURE — 99214 PR OFFICE/OUTPT VISIT, EST, LEVL IV, 30-39 MIN: ICD-10-PCS | Mod: S$GLB,,, | Performed by: FAMILY MEDICINE

## 2023-08-10 PROCEDURE — 3078F DIAST BP <80 MM HG: CPT | Mod: CPTII,S$GLB,, | Performed by: FAMILY MEDICINE

## 2023-08-10 PROCEDURE — 1101F PR PT FALLS ASSESS DOC 0-1 FALLS W/OUT INJ PAST YR: ICD-10-PCS | Mod: CPTII,S$GLB,, | Performed by: FAMILY MEDICINE

## 2023-08-10 PROCEDURE — 3078F PR MOST RECENT DIASTOLIC BLOOD PRESSURE < 80 MM HG: ICD-10-PCS | Mod: CPTII,S$GLB,, | Performed by: FAMILY MEDICINE

## 2023-08-10 PROCEDURE — 3288F FALL RISK ASSESSMENT DOCD: CPT | Mod: CPTII,S$GLB,, | Performed by: FAMILY MEDICINE

## 2023-08-10 PROCEDURE — 3074F PR MOST RECENT SYSTOLIC BLOOD PRESSURE < 130 MM HG: ICD-10-PCS | Mod: CPTII,S$GLB,, | Performed by: FAMILY MEDICINE

## 2023-08-10 RX ORDER — CLOPIDOGREL BISULFATE 75 MG/1
75 TABLET ORAL DAILY
Qty: 90 TABLET | Refills: 3 | Status: SHIPPED | OUTPATIENT
Start: 2023-08-10 | End: 2023-08-10

## 2023-08-10 RX ORDER — ATENOLOL 25 MG/1
25 TABLET ORAL NIGHTLY
Qty: 90 TABLET | Refills: 1 | Status: SHIPPED | OUTPATIENT
Start: 2023-08-10 | End: 2023-08-10

## 2023-08-10 RX ORDER — CLOPIDOGREL BISULFATE 75 MG/1
75 TABLET ORAL DAILY
Qty: 90 TABLET | Refills: 3 | Status: SHIPPED | OUTPATIENT
Start: 2023-08-10

## 2023-08-10 RX ORDER — TRAMADOL HYDROCHLORIDE AND ACETAMINOPHEN 37.5; 325 MG/1; MG/1
1 TABLET, FILM COATED ORAL 2 TIMES DAILY
Qty: 60 TABLET | Refills: 0 | Status: SHIPPED | OUTPATIENT
Start: 2023-08-10 | End: 2024-01-10 | Stop reason: SDUPTHER

## 2023-08-10 RX ORDER — TRAMADOL HYDROCHLORIDE AND ACETAMINOPHEN 37.5; 325 MG/1; MG/1
1 TABLET, FILM COATED ORAL 2 TIMES DAILY
Qty: 60 TABLET | Refills: 0 | Status: SHIPPED | OUTPATIENT
Start: 2023-08-10 | End: 2023-08-10

## 2023-08-10 RX ORDER — ATENOLOL 25 MG/1
25 TABLET ORAL NIGHTLY
Qty: 90 TABLET | Refills: 1 | Status: SHIPPED | OUTPATIENT
Start: 2023-08-10 | End: 2024-01-10 | Stop reason: SDUPTHER

## 2023-08-10 SDOH — SOCIAL DETERMINANTS OF HEALTH (SDOH): PROBLEMS IN RELATIONSHIP WITH SPOUSE OR PARTNER: Z63.0

## 2023-08-12 PROBLEM — F91.3: Status: ACTIVE | Noted: 2023-08-12

## 2023-08-12 PROBLEM — Z63.72: Status: ACTIVE | Noted: 2023-08-12

## 2023-08-12 PROBLEM — R41.3 MEMORY LOSS: Status: RESOLVED | Noted: 2021-01-27 | Resolved: 2023-08-12
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
## 2023-08-26 ENCOUNTER — HOSPITAL ENCOUNTER (EMERGENCY)
Facility: HOSPITAL | Age: 78
Discharge: HOME | End: 2023-08-26
Attending: EMERGENCY MEDICINE
Payer: MEDICARE

## 2023-08-26 ENCOUNTER — HOSPITAL ENCOUNTER (EMERGENCY)
Facility: HOSPITAL | Age: 78
Discharge: ANOTHER HOSPITAL | End: 2023-08-26
Attending: STUDENT IN AN ORGANIZED HEALTH CARE EDUCATION/TRAINING PROGRAM
Payer: MEDICARE

## 2023-08-26 VITALS
WEIGHT: 170 LBS | BODY MASS INDEX: 29.18 KG/M2 | SYSTOLIC BLOOD PRESSURE: 164 MMHG | TEMPERATURE: 99 F | OXYGEN SATURATION: 96 % | DIASTOLIC BLOOD PRESSURE: 80 MMHG | RESPIRATION RATE: 17 BRPM | HEART RATE: 77 BPM

## 2023-08-26 VITALS
WEIGHT: 170 LBS | DIASTOLIC BLOOD PRESSURE: 63 MMHG | TEMPERATURE: 98 F | BODY MASS INDEX: 29.18 KG/M2 | HEART RATE: 61 BPM | SYSTOLIC BLOOD PRESSURE: 141 MMHG | RESPIRATION RATE: 18 BRPM | OXYGEN SATURATION: 96 %

## 2023-08-26 DIAGNOSIS — T30.4: ICD-10-CM

## 2023-08-26 DIAGNOSIS — H53.9 VISION CHANGES: ICD-10-CM

## 2023-08-26 DIAGNOSIS — T54.3X1A: ICD-10-CM

## 2023-08-26 DIAGNOSIS — Z77.098 CHEMICAL EXPOSURE OF EYE: Primary | ICD-10-CM

## 2023-08-26 DIAGNOSIS — S05.92XA LEFT EYE INJURY, INITIAL ENCOUNTER: Primary | ICD-10-CM

## 2023-08-26 PROCEDURE — 25000003 PHARM REV CODE 250: Performed by: STUDENT IN AN ORGANIZED HEALTH CARE EDUCATION/TRAINING PROGRAM

## 2023-08-26 PROCEDURE — 99285 EMERGENCY DEPT VISIT HI MDM: CPT

## 2023-08-26 PROCEDURE — 99284 EMERGENCY DEPT VISIT MOD MDM: CPT

## 2023-08-26 RX ORDER — TETRAHYDROZOLINE HCL/ZINC SULF 0.05-0.25%
2 DROPS OPHTHALMIC (EYE)
Qty: 64 EACH | Refills: 3 | Status: SHIPPED | OUTPATIENT
Start: 2023-08-26

## 2023-08-26 RX ORDER — PROPARACAINE HYDROCHLORIDE 5 MG/ML
1 SOLUTION/ DROPS OPHTHALMIC
Status: COMPLETED | OUTPATIENT
Start: 2023-08-26 | End: 2023-08-26

## 2023-08-26 RX ORDER — ACETAMINOPHEN 325 MG/1
650 TABLET ORAL
Status: COMPLETED | OUTPATIENT
Start: 2023-08-26 | End: 2023-08-26

## 2023-08-26 RX ORDER — IBUPROFEN 400 MG/1
400 TABLET ORAL
Status: COMPLETED | OUTPATIENT
Start: 2023-08-26 | End: 2023-08-26

## 2023-08-26 RX ORDER — ERYTHROMYCIN 5 MG/G
OINTMENT OPHTHALMIC
Qty: 3.5 G | Refills: 0 | Status: SHIPPED | OUTPATIENT
Start: 2023-08-26

## 2023-08-26 RX ADMIN — ACETAMINOPHEN 650 MG: 325 TABLET ORAL at 06:08

## 2023-08-26 RX ADMIN — IBUPROFEN 400 MG: 400 TABLET ORAL at 06:08

## 2023-08-26 RX ADMIN — FLUORESCEIN SODIUM 1 EACH: 1 STRIP OPHTHALMIC at 04:08

## 2023-08-26 RX ADMIN — PROPARACAINE HYDROCHLORIDE 1 DROP: 5 SOLUTION/ DROPS OPHTHALMIC at 04:08

## 2023-08-26 NOTE — ED NOTES
Tanya with poison control recommends to flush eyes with 500ml-1,000ml of normal saline. Supportive care, opthamology consult should pt c/o increased vision changes, pain or swelling. MD aware.
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
## 2023-08-26 NOTE — DISCHARGE INSTRUCTIONS
Home Care Instructions:  - Medications: Continue taking your home medications as prescribed    Follow-Up Plan:  - Follow-up with: Eye doctor  - Additional testing and/or evaluation will be directed by your primary doctor    Return to the Emergency Department for symptoms including but not limited to: worsening symptoms, severe back pain, shortness of breath or chest pain, vomiting with inability to hold down fluids, blood in vomit or poop, fevers greater than 100.4°F, passing out/fainting/unconsciousness, or other concerning symptoms.
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
## 2023-08-28 ENCOUNTER — TELEPHONE (OUTPATIENT)
Dept: OPHTHALMOLOGY | Facility: CLINIC | Age: 78
End: 2023-08-28
Payer: MEDICARE

## 2023-08-28 NOTE — TELEPHONE ENCOUNTER
----- Message from Ej Guerrero MD sent at 8/26/2023  8:47 AM CDT -----  Please schedule this patient in triage clinic within next week.     [phone] is good contact number (patient is hard of hearing)      ThanksRoger

## 2023-10-04 ENCOUNTER — OFFICE VISIT (OUTPATIENT)
Dept: FAMILY MEDICINE | Facility: CLINIC | Age: 78
End: 2023-10-04
Attending: FAMILY MEDICINE
Payer: MEDICARE

## 2023-10-04 VITALS
SYSTOLIC BLOOD PRESSURE: 128 MMHG | WEIGHT: 168 LBS | HEIGHT: 64 IN | HEART RATE: 74 BPM | DIASTOLIC BLOOD PRESSURE: 70 MMHG | BODY MASS INDEX: 28.68 KG/M2

## 2023-10-04 DIAGNOSIS — I10 HYPERTENSION, UNSPECIFIED TYPE: ICD-10-CM

## 2023-10-04 DIAGNOSIS — F91.3 MILD OPPOSITIONAL DEFIANT DISORDER WITH ARGUMENTATIVE OR DEFIANT BEHAVIOR: ICD-10-CM

## 2023-10-04 DIAGNOSIS — E03.9 ACQUIRED HYPOTHYROIDISM: ICD-10-CM

## 2023-10-04 DIAGNOSIS — I25.10 CORONARY ARTERY DISEASE INVOLVING NATIVE HEART WITHOUT ANGINA PECTORIS, UNSPECIFIED VESSEL OR LESION TYPE: ICD-10-CM

## 2023-10-04 DIAGNOSIS — Z95.1 HISTORY OF CORONARY ARTERY BYPASS GRAFT: ICD-10-CM

## 2023-10-04 DIAGNOSIS — Z63.0 MARITAL PROBLEMS: ICD-10-CM

## 2023-10-04 DIAGNOSIS — H90.0 CONDUCTIVE HEARING LOSS, BILATERAL: ICD-10-CM

## 2023-10-04 DIAGNOSIS — Z96.652 S/P TOTAL KNEE ARTHROPLASTY, LEFT: ICD-10-CM

## 2023-10-04 DIAGNOSIS — Z63.72: ICD-10-CM

## 2023-10-04 DIAGNOSIS — Z69.11 COUNSELING FOR VICTIM OF SPOUSAL AND PARTNER ABUSE: Primary | ICD-10-CM

## 2023-10-04 DIAGNOSIS — R42 VERTIGO: ICD-10-CM

## 2023-10-04 DIAGNOSIS — K21.9 GASTROESOPHAGEAL REFLUX DISEASE WITHOUT ESOPHAGITIS: ICD-10-CM

## 2023-10-04 PROCEDURE — 99214 OFFICE O/P EST MOD 30 MIN: CPT | Mod: S$GLB,,, | Performed by: FAMILY MEDICINE

## 2023-10-04 PROCEDURE — 3078F DIAST BP <80 MM HG: CPT | Mod: CPTII,S$GLB,, | Performed by: FAMILY MEDICINE

## 2023-10-04 PROCEDURE — 3078F PR MOST RECENT DIASTOLIC BLOOD PRESSURE < 80 MM HG: ICD-10-PCS | Mod: CPTII,S$GLB,, | Performed by: FAMILY MEDICINE

## 2023-10-04 PROCEDURE — 1159F PR MEDICATION LIST DOCUMENTED IN MEDICAL RECORD: ICD-10-PCS | Mod: CPTII,S$GLB,, | Performed by: FAMILY MEDICINE

## 2023-10-04 PROCEDURE — 3288F FALL RISK ASSESSMENT DOCD: CPT | Mod: CPTII,S$GLB,, | Performed by: FAMILY MEDICINE

## 2023-10-04 PROCEDURE — 3074F PR MOST RECENT SYSTOLIC BLOOD PRESSURE < 130 MM HG: ICD-10-PCS | Mod: CPTII,S$GLB,, | Performed by: FAMILY MEDICINE

## 2023-10-04 PROCEDURE — 3288F PR FALLS RISK ASSESSMENT DOCUMENTED: ICD-10-PCS | Mod: CPTII,S$GLB,, | Performed by: FAMILY MEDICINE

## 2023-10-04 PROCEDURE — 99214 PR OFFICE/OUTPT VISIT, EST, LEVL IV, 30-39 MIN: ICD-10-PCS | Mod: S$GLB,,, | Performed by: FAMILY MEDICINE

## 2023-10-04 PROCEDURE — 3074F SYST BP LT 130 MM HG: CPT | Mod: CPTII,S$GLB,, | Performed by: FAMILY MEDICINE

## 2023-10-04 PROCEDURE — 1159F MED LIST DOCD IN RCRD: CPT | Mod: CPTII,S$GLB,, | Performed by: FAMILY MEDICINE

## 2023-10-04 PROCEDURE — 1101F PR PT FALLS ASSESS DOC 0-1 FALLS W/OUT INJ PAST YR: ICD-10-PCS | Mod: CPTII,S$GLB,, | Performed by: FAMILY MEDICINE

## 2023-10-04 PROCEDURE — 1101F PT FALLS ASSESS-DOCD LE1/YR: CPT | Mod: CPTII,S$GLB,, | Performed by: FAMILY MEDICINE

## 2023-10-04 RX ORDER — MECLIZINE HYDROCHLORIDE 25 MG/1
25 TABLET ORAL
Qty: 90 TABLET | Refills: 1 | Status: SHIPPED | OUTPATIENT
Start: 2023-10-04

## 2023-10-04 RX ORDER — OMEPRAZOLE 20 MG/1
20 CAPSULE, DELAYED RELEASE ORAL DAILY
Qty: 90 CAPSULE | Refills: 3 | Status: SHIPPED | OUTPATIENT
Start: 2023-10-04 | End: 2023-12-20 | Stop reason: SDUPTHER

## 2023-10-04 SDOH — SOCIAL DETERMINANTS OF HEALTH (SDOH): PROBLEMS IN RELATIONSHIP WITH SPOUSE OR PARTNER: Z63.0
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
## 2023-11-14 ENCOUNTER — CLINICAL SUPPORT (OUTPATIENT)
Dept: FAMILY MEDICINE | Facility: CLINIC | Age: 78
End: 2023-11-14
Payer: MEDICARE

## 2023-11-14 DIAGNOSIS — G31.84 MILD COGNITIVE IMPAIRMENT: ICD-10-CM

## 2023-11-14 RX ORDER — DONEPEZIL HYDROCHLORIDE 5 MG/1
5 TABLET, FILM COATED ORAL NIGHTLY
Qty: 30 TABLET | Refills: 5 | Status: SHIPPED | OUTPATIENT
Start: 2023-11-14 | End: 2024-11-13

## 2023-12-20 DIAGNOSIS — K21.9 GASTROESOPHAGEAL REFLUX DISEASE WITHOUT ESOPHAGITIS: ICD-10-CM

## 2023-12-20 RX ORDER — OMEPRAZOLE 20 MG/1
20 CAPSULE, DELAYED RELEASE ORAL DAILY
Qty: 90 CAPSULE | Refills: 3 | Status: SHIPPED | OUTPATIENT
Start: 2023-12-20

## 2023-12-20 NOTE — TELEPHONE ENCOUNTER
----- Message from Alisha Wright sent at 12/20/2023  2:36 PM CST -----  Refill Omeprazole 20 mg [facility] on [address]. Please make sure his prescription to [facility] [address]. Pt's # [phone] GH

## 2023-12-21 ENCOUNTER — TELEPHONE (OUTPATIENT)
Dept: FAMILY MEDICINE | Facility: CLINIC | Age: 78
End: 2023-12-21
Payer: MEDICARE

## 2023-12-21 DIAGNOSIS — E78.2 MIXED HYPERLIPIDEMIA: ICD-10-CM

## 2023-12-21 DIAGNOSIS — Z79.899 ENCOUNTER FOR LONG-TERM (CURRENT) USE OF OTHER MEDICATIONS: ICD-10-CM

## 2023-12-21 DIAGNOSIS — I10 HYPERTENSION, UNSPECIFIED TYPE: ICD-10-CM

## 2023-12-21 DIAGNOSIS — I25.10 CORONARY ARTERY DISEASE INVOLVING NATIVE HEART WITHOUT ANGINA PECTORIS, UNSPECIFIED VESSEL OR LESION TYPE: ICD-10-CM

## 2023-12-21 DIAGNOSIS — Z00.00 GENERAL MEDICAL EXAM: Primary | ICD-10-CM

## 2023-12-21 DIAGNOSIS — E03.9 ACQUIRED HYPOTHYROIDISM: ICD-10-CM

## 2023-12-21 NOTE — TELEPHONE ENCOUNTER
Tried calling patient x3. Call won't go through. EC is same number as patient. No other numbers on comm consent.

## 2024-01-06 LAB
ALBUMIN SERPL-MCNC: 3.9 G/DL (ref 3.6–5.1)
ALBUMIN/CREAT UR: 12 MCG/MG CREAT
ALBUMIN/GLOB SERPL: 1.4 (CALC) (ref 1–2.5)
ALP SERPL-CCNC: 47 U/L (ref 35–144)
ALT SERPL-CCNC: 17 U/L (ref 9–46)
APPEARANCE UR: CLEAR
AST SERPL-CCNC: 17 U/L (ref 10–35)
BACTERIA #/AREA URNS HPF: ABNORMAL /HPF
BACTERIA UR CULT: ABNORMAL
BACTERIA UR CULT: ABNORMAL
BASOPHILS # BLD AUTO: 43 CELLS/UL (ref 0–200)
BASOPHILS NFR BLD AUTO: 0.6 %
BILIRUB SERPL-MCNC: 0.5 MG/DL (ref 0.2–1.2)
BILIRUB UR QL STRIP: NEGATIVE
BUN SERPL-MCNC: 23 MG/DL (ref 7–25)
BUN/CREAT SERPL: 16 (CALC) (ref 6–22)
CALCIUM SERPL-MCNC: 8.8 MG/DL (ref 8.6–10.3)
CHLORIDE SERPL-SCNC: 105 MMOL/L (ref 98–110)
CHOLEST SERPL-MCNC: 152 MG/DL
CHOLEST/HDLC SERPL: 4.3 (CALC)
CO2 SERPL-SCNC: 28 MMOL/L (ref 20–32)
COLOR UR: YELLOW
CREAT SERPL-MCNC: 1.42 MG/DL (ref 0.7–1.28)
CREAT UR-MCNC: 209 MG/DL (ref 20–320)
EGFR: 51 ML/MIN/1.73M2
EOSINOPHIL # BLD AUTO: 223 CELLS/UL (ref 15–500)
EOSINOPHIL NFR BLD AUTO: 3.1 %
ERYTHROCYTE [DISTWIDTH] IN BLOOD BY AUTOMATED COUNT: 12.4 % (ref 11–15)
GLOBULIN SER CALC-MCNC: 2.7 G/DL (CALC) (ref 1.9–3.7)
GLUCOSE SERPL-MCNC: 103 MG/DL (ref 65–99)
GLUCOSE UR QL STRIP: NEGATIVE
HCT VFR BLD AUTO: 44.5 % (ref 38.5–50)
HDLC SERPL-MCNC: 35 MG/DL
HGB BLD-MCNC: 14.5 G/DL (ref 13.2–17.1)
HGB UR QL STRIP: NEGATIVE
HYALINE CASTS #/AREA URNS LPF: ABNORMAL /LPF
KETONES UR QL STRIP: NEGATIVE
LDLC SERPL CALC-MCNC: 92 MG/DL (CALC)
LEUKOCYTE ESTERASE UR QL STRIP: ABNORMAL
LYMPHOCYTES # BLD AUTO: 2131 CELLS/UL (ref 850–3900)
LYMPHOCYTES NFR BLD AUTO: 29.6 %
MCH RBC QN AUTO: 31.6 PG (ref 27–33)
MCHC RBC AUTO-ENTMCNC: 32.6 G/DL (ref 32–36)
MCV RBC AUTO: 96.9 FL (ref 80–100)
MICROALBUMIN UR-MCNC: 2.5 MG/DL
MONOCYTES # BLD AUTO: 907 CELLS/UL (ref 200–950)
MONOCYTES NFR BLD AUTO: 12.6 %
NEUTROPHILS # BLD AUTO: 3895 CELLS/UL (ref 1500–7800)
NEUTROPHILS NFR BLD AUTO: 54.1 %
NITRITE UR QL STRIP: NEGATIVE
NONHDLC SERPL-MCNC: 117 MG/DL (CALC)
PH UR STRIP: 5.5 [PH] (ref 5–8)
PLATELET # BLD AUTO: 278 THOUSAND/UL (ref 140–400)
PMV BLD REES-ECKER: 10.3 FL (ref 7.5–12.5)
POTASSIUM SERPL-SCNC: 4.1 MMOL/L (ref 3.5–5.3)
PROT SERPL-MCNC: 6.6 G/DL (ref 6.1–8.1)
PROT UR QL STRIP: ABNORMAL
RBC # BLD AUTO: 4.59 MILLION/UL (ref 4.2–5.8)
RBC #/AREA URNS HPF: ABNORMAL /HPF
SERVICE CMNT-IMP: ABNORMAL
SODIUM SERPL-SCNC: 143 MMOL/L (ref 135–146)
SP GR UR STRIP: 1.03 (ref 1–1.03)
SQUAMOUS #/AREA URNS HPF: ABNORMAL /HPF
T4 FREE SERPL-MCNC: 1.4 NG/DL (ref 0.8–1.8)
TRIGL SERPL-MCNC: 156 MG/DL
TSH SERPL-ACNC: 4.6 MIU/L (ref 0.4–4.5)
WBC # BLD AUTO: 7.2 THOUSAND/UL (ref 3.8–10.8)
WBC #/AREA URNS HPF: ABNORMAL /HPF

## 2024-01-10 ENCOUNTER — OFFICE VISIT (OUTPATIENT)
Dept: FAMILY MEDICINE | Facility: CLINIC | Age: 79
End: 2024-01-10
Attending: FAMILY MEDICINE
Payer: MEDICARE

## 2024-01-10 VITALS
WEIGHT: 173 LBS | HEART RATE: 72 BPM | SYSTOLIC BLOOD PRESSURE: 110 MMHG | DIASTOLIC BLOOD PRESSURE: 60 MMHG | HEIGHT: 64 IN | BODY MASS INDEX: 29.53 KG/M2

## 2024-01-10 DIAGNOSIS — F91.3 MILD OPPOSITIONAL DEFIANT DISORDER WITH ARGUMENTATIVE OR DEFIANT BEHAVIOR: ICD-10-CM

## 2024-01-10 DIAGNOSIS — Z63.72: ICD-10-CM

## 2024-01-10 DIAGNOSIS — M17.32 POST-TRAUMATIC OSTEOARTHRITIS OF LEFT KNEE: ICD-10-CM

## 2024-01-10 DIAGNOSIS — E03.9 ACQUIRED HYPOTHYROIDISM: ICD-10-CM

## 2024-01-10 DIAGNOSIS — I10 HYPERTENSION, UNSPECIFIED TYPE: Primary | ICD-10-CM

## 2024-01-10 DIAGNOSIS — Z63.0 MARITAL PROBLEMS: ICD-10-CM

## 2024-01-10 DIAGNOSIS — Z96.652 S/P TOTAL KNEE ARTHROPLASTY, LEFT: ICD-10-CM

## 2024-01-10 DIAGNOSIS — G62.9 NEUROPATHY: ICD-10-CM

## 2024-01-10 DIAGNOSIS — Z69.11 COUNSELING FOR VICTIM OF SPOUSAL AND PARTNER ABUSE: ICD-10-CM

## 2024-01-10 DIAGNOSIS — I25.10 CORONARY ARTERY DISEASE INVOLVING NATIVE HEART WITHOUT ANGINA PECTORIS, UNSPECIFIED VESSEL OR LESION TYPE: ICD-10-CM

## 2024-01-10 DIAGNOSIS — E07.9 THYROID DISEASE: ICD-10-CM

## 2024-01-10 DIAGNOSIS — Z95.1 HISTORY OF CORONARY ARTERY BYPASS GRAFT: ICD-10-CM

## 2024-01-10 PROCEDURE — 99214 OFFICE O/P EST MOD 30 MIN: CPT | Mod: S$GLB,,, | Performed by: FAMILY MEDICINE

## 2024-01-10 RX ORDER — LEVOTHYROXINE SODIUM 100 UG/1
100 TABLET ORAL
Qty: 90 TABLET | Refills: 3 | Status: SHIPPED | OUTPATIENT
Start: 2024-01-10 | End: 2025-01-09

## 2024-01-10 RX ORDER — TRAMADOL HYDROCHLORIDE AND ACETAMINOPHEN 37.5; 325 MG/1; MG/1
1 TABLET, FILM COATED ORAL 2 TIMES DAILY
Qty: 60 TABLET | Refills: 0 | Status: SHIPPED | OUTPATIENT
Start: 2024-01-10

## 2024-01-10 RX ORDER — ATENOLOL 25 MG/1
25 TABLET ORAL NIGHTLY
Qty: 90 TABLET | Refills: 1 | Status: SHIPPED | OUTPATIENT
Start: 2024-01-10

## 2024-01-10 RX ORDER — CYCLOBENZAPRINE HCL 10 MG
10 TABLET ORAL 2 TIMES DAILY PRN
Qty: 60 TABLET | Refills: 2 | Status: SHIPPED | OUTPATIENT
Start: 2024-01-10

## 2024-01-10 SDOH — SOCIAL DETERMINANTS OF HEALTH (SDOH): PROBLEMS IN RELATIONSHIP WITH SPOUSE OR PARTNER: Z63.0
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
## 2024-01-19 ENCOUNTER — TELEPHONE (OUTPATIENT)
Dept: FAMILY MEDICINE | Facility: CLINIC | Age: 79
End: 2024-01-19
Payer: MEDICARE

## 2024-05-21 DIAGNOSIS — E78.2 MIXED HYPERLIPIDEMIA: ICD-10-CM

## 2024-05-21 RX ORDER — ATORVASTATIN CALCIUM 20 MG/1
20 TABLET, FILM COATED ORAL DAILY
Qty: 90 TABLET | Refills: 3 | Status: SHIPPED | OUTPATIENT
Start: 2024-05-21

## 2024-05-21 NOTE — TELEPHONE ENCOUNTER
----- Message from Alisha Wright sent at 5/21/2024  3:22 PM CDT -----  Refill Atorvastatin [facility] on  pt's # [phone]  
[FreeTextEntry1] : 67 y/o woman presents for initial consultation for a growth on her right middle finger that she first noticed two months ago. She denies pain associated with the lesion and reports the region was yellow/blue. She reports a visit with Dr. Mohan Forbes in early February. \par \par She reports trauma to her right hand on Friday, after which the discoloration has resolved and the lump is no longer palpable.\par \par She denies smoking.

## 2024-06-12 DIAGNOSIS — G31.84 MILD COGNITIVE IMPAIRMENT: ICD-10-CM

## 2024-06-12 RX ORDER — DONEPEZIL HYDROCHLORIDE 5 MG/1
5 TABLET, FILM COATED ORAL NIGHTLY
Qty: 30 TABLET | Refills: 5 | Status: SHIPPED | OUTPATIENT
Start: 2024-06-12 | End: 2025-06-12

## 2024-06-12 NOTE — TELEPHONE ENCOUNTER
----- Message from Alisha Wright sent at 6/12/2024  2:19 PM CDT -----  Refill Donepezil [facility] on . Pt's # [phone]

## 2024-06-20 DIAGNOSIS — E03.9 ACQUIRED HYPOTHYROIDISM: ICD-10-CM

## 2024-06-20 RX ORDER — LEVOTHYROXINE SODIUM 100 UG/1
100 TABLET ORAL
Qty: 90 TABLET | Refills: 3 | Status: SHIPPED | OUTPATIENT
Start: 2024-06-20 | End: 2025-06-20

## 2024-06-20 NOTE — TELEPHONE ENCOUNTER
----- Message from Brina Hernandez sent at 6/20/2024  3:28 PM CDT -----  Refill for Levothyroxine. [facility] . Pt #[phone]

## 2024-07-18 ENCOUNTER — TELEPHONE (OUTPATIENT)
Dept: ADMINISTRATIVE | Facility: CLINIC | Age: 79
End: 2024-07-18
Payer: MEDICARE

## 2024-07-23 DIAGNOSIS — R42 VERTIGO: ICD-10-CM

## 2024-07-23 RX ORDER — MECLIZINE HYDROCHLORIDE 25 MG/1
25 TABLET ORAL
Qty: 90 TABLET | Refills: 1 | Status: SHIPPED | OUTPATIENT
Start: 2024-07-23

## 2024-07-23 NOTE — TELEPHONE ENCOUNTER
----- Message from Maya Polanco sent at 7/23/2024 12:41 PM CDT -----  Refill on vertigo medication   [facility] -  and [place]   [phone]

## 2024-10-03 ENCOUNTER — TELEPHONE (OUTPATIENT)
Dept: FAMILY MEDICINE | Facility: CLINIC | Age: 79
End: 2024-10-03
Payer: MEDICARE

## 2024-10-03 NOTE — TELEPHONE ENCOUNTER
"Spoke to pts wife and let her know what PHN was asking. Pts wife stated the boss is still sleeping and she is "pissed off" so she is not waking him up. Pts wife Alyssa asked if I have ever spoken to her  and I stated not that I'm aware of and she stated well you are about to. Alyssa wrote down the information and stated " Don't expect a call back soon he is 78 and sleeps till 3 pm and wonders why I'm pissed" Explained I am okay with a call back. Alyssa laughed and hung up     "

## 2024-10-03 NOTE — TELEPHONE ENCOUNTER
----- Message from Kelsey sent at 10/3/2024  8:30 AM CDT -----  Radha with [employer] is calling to see if he is still taking atorvastatin 20 mg. The last time he had it filled was in may. She is making medication adherence calls   [phone]

## 2024-10-10 ENCOUNTER — CLINICAL SUPPORT (OUTPATIENT)
Dept: FAMILY MEDICINE | Facility: CLINIC | Age: 79
End: 2024-10-10
Payer: MEDICARE

## 2024-10-10 DIAGNOSIS — I25.10 CORONARY ARTERY DISEASE INVOLVING NATIVE HEART WITHOUT ANGINA PECTORIS, UNSPECIFIED VESSEL OR LESION TYPE: ICD-10-CM

## 2024-10-10 RX ORDER — CLOPIDOGREL BISULFATE 75 MG/1
75 TABLET ORAL DAILY
Qty: 90 TABLET | Refills: 3 | Status: SHIPPED | OUTPATIENT
Start: 2024-10-10

## 2024-10-14 ENCOUNTER — TELEPHONE (OUTPATIENT)
Dept: FAMILY MEDICINE | Facility: CLINIC | Age: 79
End: 2024-10-14
Payer: MEDICARE

## 2024-10-14 DIAGNOSIS — Z79.899 ENCOUNTER FOR LONG-TERM (CURRENT) USE OF MEDICATIONS: Primary | ICD-10-CM

## 2024-10-14 DIAGNOSIS — E78.2 MIXED HYPERLIPIDEMIA: ICD-10-CM

## 2024-10-15 ENCOUNTER — OFFICE VISIT (OUTPATIENT)
Dept: FAMILY MEDICINE | Facility: CLINIC | Age: 79
End: 2024-10-15
Payer: MEDICARE

## 2024-10-15 VITALS
HEIGHT: 64 IN | BODY MASS INDEX: 29.53 KG/M2 | SYSTOLIC BLOOD PRESSURE: 112 MMHG | HEART RATE: 77 BPM | WEIGHT: 173 LBS | DIASTOLIC BLOOD PRESSURE: 66 MMHG

## 2024-10-15 DIAGNOSIS — Z96.652 S/P TOTAL KNEE ARTHROPLASTY, LEFT: ICD-10-CM

## 2024-10-15 DIAGNOSIS — N18.31 CHRONIC KIDNEY DISEASE, STAGE 3A: ICD-10-CM

## 2024-10-15 DIAGNOSIS — Z63.72: ICD-10-CM

## 2024-10-15 DIAGNOSIS — Z23 NEED FOR INFLUENZA VACCINATION: ICD-10-CM

## 2024-10-15 DIAGNOSIS — F91.3 MILD OPPOSITIONAL DEFIANT DISORDER WITH ARGUMENTATIVE OR DEFIANT BEHAVIOR: ICD-10-CM

## 2024-10-15 DIAGNOSIS — H90.0 CONDUCTIVE HEARING LOSS, BILATERAL: ICD-10-CM

## 2024-10-15 DIAGNOSIS — G62.9 NEUROPATHY: ICD-10-CM

## 2024-10-15 DIAGNOSIS — E03.9 ACQUIRED HYPOTHYROIDISM: ICD-10-CM

## 2024-10-15 DIAGNOSIS — I10 HYPERTENSION, UNSPECIFIED TYPE: Primary | ICD-10-CM

## 2024-10-15 DIAGNOSIS — Z95.1 HISTORY OF CORONARY ARTERY BYPASS GRAFT: ICD-10-CM

## 2024-10-15 DIAGNOSIS — K21.9 GASTROESOPHAGEAL REFLUX DISEASE WITHOUT ESOPHAGITIS: ICD-10-CM

## 2024-10-15 DIAGNOSIS — Z69.11 COUNSELING FOR VICTIM OF SPOUSAL AND PARTNER ABUSE: ICD-10-CM

## 2024-10-15 DIAGNOSIS — I25.10 CORONARY ARTERY DISEASE INVOLVING NATIVE HEART WITHOUT ANGINA PECTORIS, UNSPECIFIED VESSEL OR LESION TYPE: ICD-10-CM

## 2024-10-15 LAB
ALBUMIN SERPL-MCNC: 3.8 G/DL (ref 3.6–5.1)
ALBUMIN/GLOB SERPL: 1.2 (CALC) (ref 1–2.5)
ALP SERPL-CCNC: 44 U/L (ref 35–144)
ALT SERPL-CCNC: 13 U/L (ref 9–46)
AST SERPL-CCNC: 15 U/L (ref 10–35)
BILIRUB SERPL-MCNC: 0.7 MG/DL (ref 0.2–1.2)
BUN SERPL-MCNC: 18 MG/DL (ref 7–25)
BUN/CREAT SERPL: 13 (CALC) (ref 6–22)
CALCIUM SERPL-MCNC: 9 MG/DL (ref 8.6–10.3)
CHLORIDE SERPL-SCNC: 103 MMOL/L (ref 98–110)
CHOLEST SERPL-MCNC: 179 MG/DL
CHOLEST/HDLC SERPL: 5.3 (CALC)
CO2 SERPL-SCNC: 27 MMOL/L (ref 20–32)
CREAT SERPL-MCNC: 1.34 MG/DL (ref 0.7–1.28)
EGFR: 55 ML/MIN/1.73M2
GLOBULIN SER CALC-MCNC: 3.2 G/DL (CALC) (ref 1.9–3.7)
GLUCOSE SERPL-MCNC: 96 MG/DL (ref 65–99)
HDLC SERPL-MCNC: 34 MG/DL
LDLC SERPL CALC-MCNC: 121 MG/DL (CALC)
NONHDLC SERPL-MCNC: 145 MG/DL (CALC)
POTASSIUM SERPL-SCNC: 3.9 MMOL/L (ref 3.5–5.3)
PROT SERPL-MCNC: 7 G/DL (ref 6.1–8.1)
SODIUM SERPL-SCNC: 141 MMOL/L (ref 135–146)
TRIGL SERPL-MCNC: 125 MG/DL

## 2024-10-15 PROCEDURE — 3288F FALL RISK ASSESSMENT DOCD: CPT | Mod: CPTII,S$GLB,, | Performed by: FAMILY MEDICINE

## 2024-10-15 PROCEDURE — 90653 IIV ADJUVANT VACCINE IM: CPT | Mod: S$GLB,,, | Performed by: FAMILY MEDICINE

## 2024-10-15 PROCEDURE — 3078F DIAST BP <80 MM HG: CPT | Mod: CPTII,S$GLB,, | Performed by: FAMILY MEDICINE

## 2024-10-15 PROCEDURE — G0008 ADMIN INFLUENZA VIRUS VAC: HCPCS | Mod: S$GLB,,, | Performed by: FAMILY MEDICINE

## 2024-10-15 PROCEDURE — 1125F AMNT PAIN NOTED PAIN PRSNT: CPT | Mod: CPTII,S$GLB,, | Performed by: FAMILY MEDICINE

## 2024-10-15 PROCEDURE — 99214 OFFICE O/P EST MOD 30 MIN: CPT | Mod: S$GLB,,, | Performed by: FAMILY MEDICINE

## 2024-10-15 PROCEDURE — 3074F SYST BP LT 130 MM HG: CPT | Mod: CPTII,S$GLB,, | Performed by: FAMILY MEDICINE

## 2024-10-15 PROCEDURE — 1159F MED LIST DOCD IN RCRD: CPT | Mod: CPTII,S$GLB,, | Performed by: FAMILY MEDICINE

## 2024-10-15 PROCEDURE — 1101F PT FALLS ASSESS-DOCD LE1/YR: CPT | Mod: CPTII,S$GLB,, | Performed by: FAMILY MEDICINE

## 2024-10-15 RX ORDER — ATENOLOL 25 MG/1
25 TABLET ORAL NIGHTLY
Qty: 90 TABLET | Refills: 1 | Status: SHIPPED | OUTPATIENT
Start: 2024-10-15

## 2024-10-15 RX ORDER — OMEPRAZOLE 20 MG/1
20 CAPSULE, DELAYED RELEASE ORAL DAILY
Qty: 90 CAPSULE | Refills: 3 | Status: SHIPPED | OUTPATIENT
Start: 2024-10-15
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
## 2024-10-20 PROBLEM — N18.31 CHRONIC KIDNEY DISEASE, STAGE 3A: Status: ACTIVE | Noted: 2024-10-20

## 2025-01-06 ENCOUNTER — CLINICAL SUPPORT (OUTPATIENT)
Dept: FAMILY MEDICINE | Facility: CLINIC | Age: 80
End: 2025-01-06
Payer: MEDICARE

## 2025-01-06 DIAGNOSIS — G31.84 MILD COGNITIVE IMPAIRMENT: ICD-10-CM

## 2025-01-06 RX ORDER — DONEPEZIL HYDROCHLORIDE 5 MG/1
5 TABLET, FILM COATED ORAL NIGHTLY
Qty: 30 TABLET | Refills: 5 | Status: SHIPPED | OUTPATIENT
Start: 2025-01-06 | End: 2026-01-06

## 2025-03-24 DIAGNOSIS — Z00.00 ENCOUNTER FOR MEDICARE ANNUAL WELLNESS EXAM: ICD-10-CM

## 2025-03-27 NOTE — MEDICAL/APP STUDENT
"Subjective     Patient ID: Mor Hill is a 77 y.o. male.    Chief Complaint: Hypertension (Brought bottles, need refills, left knee and leg cramps, and pain, multiple joins pain, deshawn problems, abc )    78 yo M who served in the [employer] comes in complaining of "getting old" with diffuse arthritic pain in his knees bilaterally, ankles bilaterally, hands bilaterally as well as bilateral torn rotator cuffs. He is s/p L TKR with Dr. Linda which required revisions and multiple shoulder surgeries bilaterally. He takes ibuprofen for his pains with some relief.    Pt presented to the ED in October 2022 due to vertigo which was treated with meclizine. He is compliant with Rx and has had no episodes since.     He is very hard of hearing and [facility].     Pt is s/p triple CABG with Dr. Andrew and follows with Dr. Reyes    Colonscopy 11/2021 with Dr. Ortega RT in 3 yr.     Review of Systems   Constitutional:  Negative for appetite change, chills, fatigue, fever and unexpected weight change.   HENT:  Negative for nasal congestion, ear pain and trouble swallowing.    Eyes:  Negative for pain, discharge and visual disturbance.   Respiratory:  Negative for apnea, cough, shortness of breath and wheezing.    Cardiovascular:  Negative for chest pain and leg swelling.   Gastrointestinal:  Positive for reflux (controlled with Rx). Negative for abdominal pain, blood in stool, constipation, diarrhea, nausea and vomiting.   Endocrine: Negative for cold intolerance, heat intolerance and polydipsia.   Genitourinary:  Negative for dysuria, hematuria, testicular pain and urgency.   Musculoskeletal:  Positive for arthralgias, leg pain and myalgias. Negative for gait problem and joint swelling.   Neurological:  Positive for vertigo (controlled with meclizine). Negative for dizziness, seizures and numbness.   Psychiatric/Behavioral:  Negative for agitation, behavioral " Attempted to reach patient. No answer. Left detailed VM, permission to do so is indicated in Epic, conveying referral signed, scheduling number given, asked patient to call with any questions or concerns.    problems and hallucinations. The patient is not nervous/anxious.         Objective     Physical Exam  Vitals and nursing note reviewed.   Constitutional:       Appearance: He is well-developed.   HENT:      Head: Normocephalic and atraumatic.      Right Ear: External ear normal.      Left Ear: External ear normal.      Nose: Nose normal.   Eyes:      Pupils: Pupils are equal, round, and reactive to light.   Neck:      Thyroid: No thyromegaly.      Vascular: No carotid bruit.   Cardiovascular:      Rate and Rhythm: Normal rate and regular rhythm.      Heart sounds: Normal heart sounds. No murmur heard.  Pulmonary:      Effort: Pulmonary effort is normal.      Breath sounds: Normal breath sounds. No wheezing or rales.   Abdominal:      General: Bowel sounds are normal. There is no distension.      Palpations: Abdomen is soft.      Tenderness: There is no abdominal tenderness.   Musculoskeletal:         General: No tenderness or deformity. Normal range of motion.      Cervical back: Normal range of motion and neck supple.      Lumbar back: Normal. No spasms.      Comments: Bends 90 degrees at  waist    Abducts R arm fully, L arm some difficulty with abduction due to pain   Lymphadenopathy:      Cervical: No cervical adenopathy.   Skin:     General: Skin is warm and dry.      Findings: No rash.   Neurological:      Mental Status: He is alert and oriented to person, place, and time.      Cranial Nerves: No cranial nerve deficit.      Coordination: Coordination normal.      Comments: MMSE of 28/30  Word Recall 2/3   Psychiatric:         Behavior: Behavior normal.         Thought Content: Thought content normal.         Judgment: Judgment normal.          Assessment and Plan     Problem List Items Addressed This Visit          Cardiac/Vascular    Coronary artery disease       Endocrine    Acquired hypothyroidism       Orthopedic    Osteoarthritis of left knee - Primary     Other Visit Diagnoses       Vertigo        Mixed  hyperlipidemia

## 2025-04-03 ENCOUNTER — TELEPHONE (OUTPATIENT)
Dept: FAMILY MEDICINE | Facility: CLINIC | Age: 80
End: 2025-04-03
Payer: MEDICARE

## 2025-04-03 DIAGNOSIS — Z79.899 ENCOUNTER FOR LONG-TERM (CURRENT) USE OF MEDICATIONS: Primary | ICD-10-CM

## 2025-04-03 DIAGNOSIS — E03.9 ACQUIRED HYPOTHYROIDISM: ICD-10-CM

## 2025-04-03 DIAGNOSIS — E07.9 THYROID DISEASE: ICD-10-CM

## 2025-04-03 DIAGNOSIS — E78.2 MIXED HYPERLIPIDEMIA: ICD-10-CM

## 2025-04-03 DIAGNOSIS — I10 HYPERTENSION, UNSPECIFIED TYPE: ICD-10-CM

## 2025-04-03 NOTE — TELEPHONE ENCOUNTER
Spoke to patient wife, Alyssa, that fasting lab and urine is due a week prior to visit, orders at [facility], encouraged water. Advised he can take morning meds that do not need to be taken with food.

## 2025-04-23 ENCOUNTER — OFFICE VISIT (OUTPATIENT)
Dept: FAMILY MEDICINE | Facility: CLINIC | Age: 80
End: 2025-04-23
Payer: MEDICARE

## 2025-04-23 VITALS
SYSTOLIC BLOOD PRESSURE: 122 MMHG | HEART RATE: 80 BPM | WEIGHT: 167 LBS | BODY MASS INDEX: 28.51 KG/M2 | OXYGEN SATURATION: 96 % | HEIGHT: 64 IN | DIASTOLIC BLOOD PRESSURE: 70 MMHG

## 2025-04-23 DIAGNOSIS — H90.0 CONDUCTIVE HEARING LOSS, BILATERAL: ICD-10-CM

## 2025-04-23 DIAGNOSIS — Z63.0 MARITAL PROBLEMS: ICD-10-CM

## 2025-04-23 DIAGNOSIS — R42 VERTIGO: ICD-10-CM

## 2025-04-23 DIAGNOSIS — I10 HYPERTENSION, UNSPECIFIED TYPE: ICD-10-CM

## 2025-04-23 DIAGNOSIS — M17.32 POST-TRAUMATIC OSTEOARTHRITIS OF LEFT KNEE: ICD-10-CM

## 2025-04-23 DIAGNOSIS — F91.3 MILD OPPOSITIONAL DEFIANT DISORDER WITH ARGUMENTATIVE OR DEFIANT BEHAVIOR: ICD-10-CM

## 2025-04-23 DIAGNOSIS — I25.10 CORONARY ARTERY DISEASE INVOLVING NATIVE HEART WITHOUT ANGINA PECTORIS, UNSPECIFIED VESSEL OR LESION TYPE: ICD-10-CM

## 2025-04-23 DIAGNOSIS — Z95.1 HISTORY OF CORONARY ARTERY BYPASS GRAFT: ICD-10-CM

## 2025-04-23 DIAGNOSIS — N18.31 CHRONIC KIDNEY DISEASE, STAGE 3A: ICD-10-CM

## 2025-04-23 DIAGNOSIS — J44.9 CHRONIC OBSTRUCTIVE PULMONARY DISEASE, UNSPECIFIED COPD TYPE: ICD-10-CM

## 2025-04-23 DIAGNOSIS — G31.84 MILD COGNITIVE IMPAIRMENT: ICD-10-CM

## 2025-04-23 DIAGNOSIS — E78.2 MIXED HYPERLIPIDEMIA: ICD-10-CM

## 2025-04-23 DIAGNOSIS — Z96.652 S/P TOTAL KNEE ARTHROPLASTY, LEFT: ICD-10-CM

## 2025-04-23 DIAGNOSIS — E03.9 ACQUIRED HYPOTHYROIDISM: Primary | ICD-10-CM

## 2025-04-23 DIAGNOSIS — E07.9 THYROID DISEASE: ICD-10-CM

## 2025-04-23 PROCEDURE — 99214 OFFICE O/P EST MOD 30 MIN: CPT | Mod: S$GLB,,, | Performed by: FAMILY MEDICINE

## 2025-04-23 PROCEDURE — 3288F FALL RISK ASSESSMENT DOCD: CPT | Mod: CPTII,S$GLB,, | Performed by: FAMILY MEDICINE

## 2025-04-23 PROCEDURE — 3074F SYST BP LT 130 MM HG: CPT | Mod: CPTII,S$GLB,, | Performed by: FAMILY MEDICINE

## 2025-04-23 PROCEDURE — 1125F AMNT PAIN NOTED PAIN PRSNT: CPT | Mod: CPTII,S$GLB,, | Performed by: FAMILY MEDICINE

## 2025-04-23 PROCEDURE — 1101F PT FALLS ASSESS-DOCD LE1/YR: CPT | Mod: CPTII,S$GLB,, | Performed by: FAMILY MEDICINE

## 2025-04-23 PROCEDURE — 1159F MED LIST DOCD IN RCRD: CPT | Mod: CPTII,S$GLB,, | Performed by: FAMILY MEDICINE

## 2025-04-23 PROCEDURE — 3078F DIAST BP <80 MM HG: CPT | Mod: CPTII,S$GLB,, | Performed by: FAMILY MEDICINE

## 2025-04-23 RX ORDER — DONEPEZIL HYDROCHLORIDE 5 MG/1
5 TABLET, FILM COATED ORAL NIGHTLY
Qty: 30 TABLET | Refills: 5 | Status: SHIPPED | OUTPATIENT
Start: 2025-04-23 | End: 2026-04-23

## 2025-04-23 RX ORDER — CLOPIDOGREL BISULFATE 75 MG/1
75 TABLET ORAL DAILY
Qty: 90 TABLET | Refills: 3 | Status: SHIPPED | OUTPATIENT
Start: 2025-04-23

## 2025-04-23 RX ORDER — LEVOTHYROXINE SODIUM 125 UG/1
125 TABLET ORAL
Qty: 90 TABLET | Refills: 3 | Status: SHIPPED | OUTPATIENT
Start: 2025-04-23 | End: 2026-04-23

## 2025-04-23 RX ORDER — ATENOLOL 25 MG/1
25 TABLET ORAL NIGHTLY
Qty: 90 TABLET | Refills: 1 | Status: SHIPPED | OUTPATIENT
Start: 2025-04-23

## 2025-04-23 RX ORDER — TRAMADOL HYDROCHLORIDE AND ACETAMINOPHEN 37.5; 325 MG/1; MG/1
1 TABLET ORAL 2 TIMES DAILY
Qty: 60 TABLET | Refills: 0 | Status: SHIPPED | OUTPATIENT
Start: 2025-04-23

## 2025-04-23 RX ORDER — ATORVASTATIN CALCIUM 20 MG/1
20 TABLET, FILM COATED ORAL DAILY
Qty: 90 TABLET | Refills: 3 | Status: SHIPPED | OUTPATIENT
Start: 2025-04-23

## 2025-04-23 RX ORDER — MECLIZINE HYDROCHLORIDE 25 MG/1
25 TABLET ORAL
Qty: 90 TABLET | Refills: 1 | Status: SHIPPED | OUTPATIENT
Start: 2025-04-23

## 2025-04-23 SDOH — SOCIAL DETERMINANTS OF HEALTH (SDOH): PROBLEMS IN RELATIONSHIP WITH SPOUSE OR PARTNER: Z63.0

## 2025-04-23 NOTE — PROGRESS NOTES
SUBJECTIVE:    Patient ID: Mor Hill is a 79 y.o. male.    Chief Complaint: Follow-up (Bottles brought//Pt is here for a check up and medication refills//JEREMÍAS )    HPI    History of Present Illness    CHIEF COMPLAINT:  Mor presents today for follow up    MUSCULOSKELETAL PAIN:  He reports multiple joint pain complaints. His knee with replacement has sharp pains and prevents bending over. Left shoulder pain is particularly bothersome at night, requiring multiple pillows for sleep positioning. He has a history of left shoulder issues from previous exercise regimen. He also reports mid-back pain attributed to prior [employer] service.     SERVICE HISTORY:  He served multiple enlistments in the [employer], followed by service in the [employer], and later transitioned to [employer].    MEDICATIONS:  He takes approximately 7-8 medications daily, either in the morning or early afternoon. This includes Tramadol for pain management, which provides relief until approximately 11:30 PM - 12:00 AM, and thyroid medication.    ENDOCRINE:  Thyroid labs show underactive thyroid.      ROS:  Constitutional: -appetite change, -chills, -fatigue, -fever, -unexpected weight change  HENT: -ear pain, -trouble swallowing  Eyes: -pain, -discharge, -visual disturbance  Respiratory: -apnea, -cough, -shortness of breath, -wheezing  Cardiovascular: -chest pain, -leg swelling  Gastrointestinal: -abdominal pain, -blood in stool, -constipation, -diarrhea, -nausea, -vomiting, -reflux  Endocrine: -cold intolerance, -heat intolerance, -polydipsia  Genitourinary: -bladder incontinence, -dysuria, -erectile dysfunction, -frequency, -hematuria, -testicular pain, -urgency, -nocturia  Musculoskeletal: -gait problem, -joint swelling, -myalgia, +joint pain, +limb pain, +nightime pain, +limited movement, +back pain  Neurological: -dizziness, -seizures, -numbness  Psychiatric/Behavioral: -agitation, -hallucinations, -nervous,  -anxiety symptoms         Telephone on 04/03/2025   Component Date Value Ref Range Status    Cholesterol 04/14/2025 173  <200 mg/dL Final    HDL 04/14/2025 41  > OR = 40 mg/dL Final    Triglycerides 04/14/2025 107  <150 mg/dL Final    LDL Cholesterol 04/14/2025 111 (H)  mg/dL (calc) Final    HDL/Cholesterol Ratio 04/14/2025 4.2  <5.0 (calc) Final    Non HDL Chol. (LDL+VLDL) 04/14/2025 132 (H)  <130 mg/dL (calc) Final    Creatinine, Urine 04/14/2025 157  20 - 320 mg/dL Final    Microalb, Ur 04/14/2025 1.4  See Note: mg/dL Final    Microalb/Creat Ratio 04/14/2025 9  <30 mg/g creat Final    Glucose 04/14/2025 106 (H)  65 - 99 mg/dL Final    BUN 04/14/2025 18  7 - 25 mg/dL Final    Creatinine 04/14/2025 1.30 (H)  0.70 - 1.28 mg/dL Final    eGFR 04/14/2025 56 (L)  > OR = 60 mL/min/1.73m2 Final    BUN/Creatinine Ratio 04/14/2025 14  6 - 22 (calc) Final    Sodium 04/14/2025 140  135 - 146 mmol/L Final    Potassium 04/14/2025 4.4  3.5 - 5.3 mmol/L Final    Chloride 04/14/2025 104  98 - 110 mmol/L Final    CO2 04/14/2025 26  20 - 32 mmol/L Final    Calcium 04/14/2025 9.3  8.6 - 10.3 mg/dL Final    Total Protein 04/14/2025 6.8  6.1 - 8.1 g/dL Final    Albumin 04/14/2025 4.0  3.6 - 5.1 g/dL Final    Globulin, Total 04/14/2025 2.8  1.9 - 3.7 g/dL (calc) Final    Albumin/Globulin Ratio 04/14/2025 1.4  1.0 - 2.5 (calc) Final    Total Bilirubin 04/14/2025 0.5  0.2 - 1.2 mg/dL Final    Alkaline Phosphatase 04/14/2025 47  35 - 144 U/L Final    AST 04/14/2025 17  10 - 35 U/L Final    ALT 04/14/2025 12  9 - 46 U/L Final    TSH w/reflex to FT4 04/14/2025 5.83 (H)  0.40 - 4.50 mIU/L Final    T4, Free 04/14/2025 1.1  0.8 - 1.8 ng/dL Final    Color, UA 04/14/2025 YELLOW  YELLOW Final    Appearance, UA 04/14/2025 CLEAR  CLEAR Final    Specific Gravity, UA 04/14/2025 1.025  1.001 - 1.035 Final    pH, UA 04/14/2025 5.5  5.0 - 8.0 Final    Glucose, UA 04/14/2025 NEGATIVE  NEGATIVE Final    Bilirubin, UA 04/14/2025 NEGATIVE  NEGATIVE Final     Ketones, UA 04/14/2025 NEGATIVE  NEGATIVE Final    Occult Blood UA 04/14/2025 NEGATIVE  NEGATIVE Final    Protein, UA 04/14/2025 NEGATIVE  NEGATIVE Final    Nitrite, UA 04/14/2025 NEGATIVE  NEGATIVE Final    Leukocytes, UA 04/14/2025 NEGATIVE  NEGATIVE Final    WBC Casts, UA 04/14/2025 NONE SEEN  < OR = 5 /HPF Final    RBC Casts, UA 04/14/2025 NONE SEEN  < OR = 2 /HPF Final    Squam Epithel, UA 04/14/2025 NONE SEEN  < OR = 5 /HPF Final    Bacteria, UA 04/14/2025 NONE SEEN  NONE SEEN /HPF Final    Hyaline Casts, UA 04/14/2025 NONE SEEN  NONE SEEN /LPF Final    Service Cmt: 04/14/2025 SEE COMMENT   Final    Reflexive Urine Culture 04/14/2025 SEE COMMENT   Final    WBC 04/14/2025 6.0  3.8 - 10.8 Thousand/uL Final    RBC 04/14/2025 4.56  4.20 - 5.80 Million/uL Final    Hemoglobin 04/14/2025 14.3  13.2 - 17.1 g/dL Final    Hematocrit 04/14/2025 43.5  38.5 - 50.0 % Final    MCV 04/14/2025 95.4  80.0 - 100.0 fL Final    MCH 04/14/2025 31.4  27.0 - 33.0 pg Final    MCHC 04/14/2025 32.9  32.0 - 36.0 g/dL Final    RDW 04/14/2025 12.2  11.0 - 15.0 % Final    Platelets 04/14/2025 233  140 - 400 Thousand/uL Final    MPV 04/14/2025 10.0  7.5 - 12.5 fL Final    Neutrophils, Abs 04/14/2025 3,138  1,500 - 7,800 cells/uL Final    Lymph # 04/14/2025 1,932  850 - 3,900 cells/uL Final    Mono # 04/14/2025 594  200 - 950 cells/uL Final    Eos # 04/14/2025 294  15 - 500 cells/uL Final    Baso # 04/14/2025 42  0 - 200 cells/uL Final    Neutrophils Relative 04/14/2025 52.3  % Final    Lymph % 04/14/2025 32.2  % Final    Mono % 04/14/2025 9.9  % Final    Eosinophil % 04/14/2025 4.9  % Final    Basophil % 04/14/2025 0.7  % Final       Past Medical History:   Diagnosis Date    Anticoagulant long-term use     Arthritis     COPD (chronic obstructive pulmonary disease)     Coronary artery disease     Fx     RIBS    Hearing loss     LEFT EAR AID; RIGHT EAR OTC DEVICE    Heart attack     Hypertension     Thyroid disease     Vertigo      Social  "History[1]  Past Surgical History:   Procedure Laterality Date    bilateral hammer toe      X 5    CARDIAC SURGERY      bypass X 3, 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    The CVD Risk score (LEROY'Agostino, et al., 2008) failed to calculate for the following reasons:    The 2008 CVD risk score is only valid for ages 30 to 74    The patient has a prior MI, stroke, CHF, or peripheral vascular disease diagnosis    All of your core healthy metrics are met.      Review of patient's allergies indicates:   Allergen Reactions    Iodinated contrast media     Iodine      Other reaction(s): in XRAY CONTRAST --swelling    Iodine and iodide containing products     Pentothal [thiopental sodium]      Trouble waking up, went into a deep sleep     Current Medications[2]    Review of Systems        Objective:      Vitals:    04/23/25 1549   BP: 122/70   Pulse: 80   SpO2: 96%   Weight: 75.8 kg (167 lb)   Height: 5' 4" (1.626 m)     Physical Exam  Vitals and nursing note reviewed.   Constitutional:       General: He is not in acute distress.     Appearance: Normal appearance. He is well-developed. He is not toxic-appearing.      Comments: Moderately disheveled elderly male with no distress he is hard of hearing.   HENT:      Head: Normocephalic and atraumatic.      Right Ear: Tympanic membrane and external ear normal.      Left Ear: Tympanic membrane and external ear normal.      Ears:      Comments: Hearing aid present in the right ear     Nose: Nose normal.      Mouth/Throat:      Pharynx: Oropharynx is clear. No posterior oropharyngeal erythema.   Eyes:      Pupils: Pupils " are equal, round, and reactive to light.   Neck:      Thyroid: No thyromegaly.      Vascular: No carotid bruit.   Cardiovascular:      Rate and Rhythm: Normal rate and regular rhythm.      Heart sounds: Normal heart sounds. No murmur heard.  Pulmonary:      Effort: Pulmonary effort is normal.      Breath sounds: Normal breath sounds. No wheezing or rales.   Abdominal:      General: Bowel sounds are normal. There is no distension.      Palpations: Abdomen is soft.      Tenderness: There is no abdominal tenderness.   Musculoskeletal:         General: No tenderness or deformity. Normal range of motion.      Cervical back: Normal range of motion and neck supple.      Lumbar back: Normal. No spasms.      Comments: Bends 90 degrees at  waist, shoulders and knees have full range of motion, no pitting edema to lower extremities, left TKR has good flexion and extension, no pitting edema   Lymphadenopathy:      Cervical: No cervical adenopathy.   Skin:     General: Skin is warm and dry.      Findings: No rash.   Neurological:      General: No focal deficit present.      Mental Status: He is alert and oriented to person, place, and time. Mental status is at baseline.      Cranial Nerves: No cranial nerve deficit.      Coordination: Coordination normal.      Gait: Gait abnormal (has abnormal shuffling limping gait).   Psychiatric:         Mood and Affect: Mood normal.         Behavior: Behavior normal.         Thought Content: Thought content normal.         Judgment: Judgment normal.       Physical Exam                  Assessment:       1. Acquired hypothyroidism    2. Hypertension, unspecified type    3. Mixed hyperlipidemia    4. Vertigo    5. Coronary artery disease involving native heart without angina pectoris, unspecified vessel or lesion type    6. Mild cognitive impairment    7. Post-traumatic osteoarthritis of left knee    8. Mild oppositional defiant disorder with argumentative or defiant behavior    9. Conductive  hearing loss, bilateral    10. History of coronary artery bypass graft    11. Chronic kidney disease, stage 3a    12. Thyroid disease    13. S/P total knee arthroplasty, left    14. Marital problems    15. Chronic obstructive pulmonary disease, unspecified COPD type         Plan:       Acquired hypothyroidism  -     levothyroxine (UNITHROID) 125 MCG tablet; Take 1 tablet (125 mcg total) by mouth before breakfast.  Dispense: 90 tablet; Refill: 3  TSH elevated to 5.83, increase Unithroid 125 mcg daily, recheck labs again in six-months  Hypertension, unspecified type  Comments:  Currently stable and well controlled.  Continue as is.  Orders:  -     atenoloL (TENORMIN) 25 MG tablet; Take 1 tablet (25 mg total) by mouth every evening.  Dispense: 90 tablet; Refill: 1    Mixed hyperlipidemia  -     atorvastatin (LIPITOR) 20 MG tablet; Take 1 tablet (20 mg total) by mouth once daily.  Dispense: 90 tablet; Refill: 3  Cholesterol 173 HDL 41    Vertigo  -     meclizine (ANTIVERT) 25 mg tablet; Take 1 tablet (25 mg total) by mouth as needed.  Dispense: 90 tablet; Refill: 1    Coronary artery disease involving native heart without angina pectoris, unspecified vessel or lesion type  -     clopidogreL (PLAVIX) 75 mg tablet; Take 1 tablet (75 mg total) by mouth once daily.  Dispense: 90 tablet; Refill: 3    Mild cognitive impairment  -     donepeziL (ARICEPT) 5 MG tablet; Take 1 tablet (5 mg total) by mouth every evening.  Dispense: 30 tablet; Refill: 5    Post-traumatic osteoarthritis of left knee  -     tramadol-acetaminophen 37.5-325 mg (ULTRACET) 37.5-325 mg Tab; Take 1 tablet by mouth 2 (two) times a day.  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  Still having arguments with his wife  Conductive hearing loss, bilateral  Wearing hearing aid right ear  History of coronary artery bypass graft  No angina reported  Chronic kidney disease, stage 3a  GFR  56 creatinine 1.3  Thyroid disease    S/P total knee arthroplasty, left    Marital problems  He and his wife do not get along frequently, sleeping separate bedrooms  Chronic obstructive pulmonary disease, unspecified COPD type      Assessment & Plan    N18.31 Chronic kidney disease, stage 3a  M25.561 Pain in right knee  M25.512 Pain in left shoulder  M54.6 Pain in thoracic spine  Z96.651 Presence of right artificial knee joint  E03.9 Hypothyroidism, unspecified  G89.29 Other chronic pain    IMPRESSION:  - Assessed overall health status through review of lab results.  - Noted underactive thyroid based on lab results and increased thyroid medication dosage to address underactive thyroid.  - Evaluated ongoing knee pain and determined need for pain management.  - Considered sleep patterns in relation to pain management.    CHRONIC KIDNEY DISEASE, STAGE 3A:  - Monitored kidney function; recent labs indicates good status.    RIGHT KNEE PAIN AND ARTIFICIAL JOINT:  - Mor reports sharp pains in the right knee with a new artificial joint, causing difficulty bending over.  - Evaluated the knee and discussed pain management.  - Continued Tramadol for pain relief, with one pill lasting until 11:30 PM or midnight.  - Provided additional pills and refills to ensure coverage through late night hours.    LEFT SHOULDER PAIN:  - Mor reports left arm pain, particularly at night.  - Evaluated shoulder mobility; patient can raise arm but experiences some pain.  - Assessed ability to sleep on the affected shoulder.    THORACIC SPINE PAIN:  - Mor reports a sore spot in the middle of his back.    HYPOTHYROIDISM:  - Blood work indicates an underactive thyroid.  - Inquired about patient's history of thyroid medication.  - Planned to increase the dosage of thyroid medication based on evaluation results.    CHRONIC PAIN:  - Mor reports general aches and pains.  - Continued Tramadol for overall pain management.         No follow-ups  on file.        This note was generated with the assistance of ambient listening technology. Verbal consent was obtained by the patient and accompanying visitor(s) for the recording of patient appointment to facilitate this note. I attest to having reviewed and edited the generated note for accuracy, though some syntax or spelling errors may persist. Please contact the author of this note for any clarification.      4/23/2025 Ayden Carter           [1]   Social History  Socioeconomic History    Marital status:    Tobacco Use    Smoking status: Former    Smokeless tobacco: Never   Substance and Sexual Activity    Alcohol use: Yes     Comment: daily boubon and cola    Drug use: No   [2]   Current Outpatient Medications:     cyclobenzaprine (FLEXERIL) 10 MG tablet, Take 1 tablet (10 mg total) by mouth 2 (two) times daily as needed., Disp: 60 tablet, Rfl: 2    dextran 70-hypromellose (ARTIFICIAL TEARS, PF,) Dpet, Apply 2 drops to eye every 2 (two) hours., Disp: 64 each, Rfl: 3    erythromycin (ROMYCIN) ophthalmic ointment, Place a 1/2 inch ribbon of ointment into the lower eyelid three times daily., Disp: 3.5 g, Rfl: 0    furosemide (LASIX) 20 MG tablet, Take 20 mg by mouth once daily., Disp: , Rfl:     ibuprofen (ADVIL,MOTRIN) 200 MG tablet, Take 2 tablets by mouth., Disp: , Rfl:     loratadine (CLARITIN) 10 mg tablet, Take 10 mg by mouth once daily., Disp: , Rfl:     omeprazole (PRILOSEC) 20 MG capsule, Take 1 capsule (20 mg total) by mouth once daily., Disp: 90 capsule, Rfl: 3    phenylephrine HCl/acetaminophn (SINUS RELIEF, NON-DROWSY, ORAL), Take by mouth., Disp: , Rfl:     atenoloL (TENORMIN) 25 MG tablet, Take 1 tablet (25 mg total) by mouth every evening., Disp: 90 tablet, Rfl: 1    atorvastatin (LIPITOR) 20 MG tablet, Take 1 tablet (20 mg total) by mouth once daily., Disp: 90 tablet, Rfl: 3    clopidogreL (PLAVIX) 75 mg tablet, Take 1 tablet (75 mg total) by mouth once daily., Disp: 90 tablet, Rfl:  3    donepeziL (ARICEPT) 5 MG tablet, Take 1 tablet (5 mg total) by mouth every evening., Disp: 30 tablet, Rfl: 5    levothyroxine (UNITHROID) 125 MCG tablet, Take 1 tablet (125 mcg total) by mouth before breakfast., Disp: 90 tablet, Rfl: 3    meclizine (ANTIVERT) 25 mg tablet, Take 1 tablet (25 mg total) by mouth as needed., Disp: 90 tablet, Rfl: 1    tramadol-acetaminophen 37.5-325 mg (ULTRACET) 37.5-325 mg Tab, Take 1 tablet by mouth 2 (two) times a day., Disp: 60 tablet, Rfl: 0

## 2025-07-29 ENCOUNTER — TELEPHONE (OUTPATIENT)
Dept: FAMILY MEDICINE | Facility: CLINIC | Age: 80
End: 2025-07-29
Payer: MEDICARE

## 2025-07-29 NOTE — TELEPHONE ENCOUNTER
Spoke to Abraham said just asked how he was doing, no suggestions made, left that on MsMissy Alyssa's voicemail

## 2025-07-29 NOTE — TELEPHONE ENCOUNTER
Wife Alyssa seen by Abraham today, Ms. Shah said that Abraham suggested something for her  but could not remember what he said, asked I call and leave message

## (undated) DEVICE — GLOVE 7.0 PROTEXIS PI BLUE

## (undated) DEVICE — SEE MEDLINE ITEM 152622

## (undated) DEVICE — ALCOHOL 70% ISOP RUBBING 4OZ

## (undated) DEVICE — BANDAGE ESMARK 6X12

## (undated) DEVICE — SHEET DRAPE MEDIUM

## (undated) DEVICE — SEE MEDLINE ITEM 152530

## (undated) DEVICE — SUT STRATAFIX SPIRAL VIOLET

## (undated) DEVICE — SPACESUIT TOGA T5 ZIPPER PEEL

## (undated) DEVICE — SEE MEDLINE ITEM 153151

## (undated) DEVICE — PACK CUSTOM UNIV BASIN SLI

## (undated) DEVICE — BLADE SAW SGTL 21.2X85.5X1.2

## (undated) DEVICE — KIT AUTO TRANSF 800ML RESVR

## (undated) DEVICE — GLOVE PROTEXIS PI CLASSIC 6.0

## (undated) DEVICE — STRIP STERI REIN CLSR 1/2X2IN

## (undated) DEVICE — PACK LOWER EXTREMITY

## (undated) DEVICE — TOURNIQUET SB QC DP 34X4IN

## (undated) DEVICE — SUT STRATAFIX PDS 1 CTX 18IN

## (undated) DEVICE — NDL SPINAL 18GX3.5 SPINOCAN

## (undated) DEVICE — SEE MEDLINE ITEM 146271

## (undated) DEVICE — DRESSING DERMACEA SPNG 10S

## (undated) DEVICE — NDL SURG MAYO CATGUT

## (undated) DEVICE — APPLICATOR CHLORAPREP ORN 26ML

## (undated) DEVICE — SPONGE SUPER KERLIX 6X6.75IN

## (undated) DEVICE — GAUZE SPONGE BULKEE 6X6.75IN

## (undated) DEVICE — REPAIR KIT SMALL JOINT BIO TEN

## (undated) DEVICE — SUT 2-0 FIBERWIRE

## (undated) DEVICE — SUT MONOCRYL 3-0 PS-1

## (undated) DEVICE — BANDAGE ACE ELASTIC 6"

## (undated) DEVICE — DRAPE C ARM 42 X 120 10/BX

## (undated) DEVICE — SUT 2-0 VICRYL / CT-1

## (undated) DEVICE — PADDING CAST SPECIALIST 6X4YD

## (undated) DEVICE — ADHESIVE MASTISOL VIAL 48/BX

## (undated) DEVICE — DRAPE STERI U-SHAPED 47X51IN

## (undated) DEVICE — SUT CTD VICRYL 1 UND BR

## (undated) DEVICE — SOL .9NACL PF 100 ML

## (undated) DEVICE — BRACE KNEE POST OP HNG PD 17IN

## (undated) DEVICE — GLOVE SURG ULTRA TOUCH 8.5

## (undated) DEVICE — INTERPULSE SET

## (undated) DEVICE — SOL IRR NACL .9% 3000ML

## (undated) DEVICE — UNDERGLOVES BIOGEL PI SIZE 8.5

## (undated) DEVICE — COLD THER SYS W/PAD UNV RH

## (undated) DEVICE — DRAPE STERI-DRAPE 1000 17X11IN

## (undated) DEVICE — CLOSURE SKIN STERI STRIP 1/2X4

## (undated) DEVICE — SEE MEDLINE ITEM 157166

## (undated) DEVICE — DRAPE STERI INSTRUMENT 1018

## (undated) DEVICE — SLEEVE SCD EXPRESS CALF MEDIUM

## (undated) DEVICE — SEE MEDLINE ITEM 157117

## (undated) DEVICE — ELECTRODE REM PLYHSV RETURN 9

## (undated) DEVICE — DRAIN SINGLE ROUND 3/16 15F

## (undated) DEVICE — SEE MEDLINE ITEM 157131

## (undated) DEVICE — Device

## (undated) DEVICE — SYR 50CC LL

## (undated) DEVICE — SUT FIBERWIRE 2 38 IN TAPER

## (undated) DEVICE — SOL 9P NACL IRR PIC IL

## (undated) DEVICE — INSTRUMENT FRAZIER 10FR W/VENT

## (undated) DEVICE — PACK BASIC

## (undated) DEVICE — GLOVE 8.5 PROTEXIS PI BLUE

## (undated) DEVICE — STRAP OR TABLE 5IN X 72IN

## (undated) DEVICE — SEE MEDLINE ITEM 157116

## (undated) DEVICE — SET DECANTER MEDICHOICE

## (undated) DEVICE — PAD CAST SPECIALIST STRL 6

## (undated) DEVICE — SUT MONO 3-0 PS-2 18 PLST

## (undated) DEVICE — SEE MEDLINE ITEM 107746

## (undated) DEVICE — LINER SUCTION 3000CC

## (undated) DEVICE — GLOVE PROTEXIS PI CLASSIC 7.0

## (undated) DEVICE — BLADE SURG CARBON STEEL #10